# Patient Record
Sex: FEMALE | Race: WHITE | NOT HISPANIC OR LATINO | Employment: FULL TIME | URBAN - NONMETROPOLITAN AREA
[De-identification: names, ages, dates, MRNs, and addresses within clinical notes are randomized per-mention and may not be internally consistent; named-entity substitution may affect disease eponyms.]

---

## 2017-03-28 ENCOUNTER — OFFICE VISIT (OUTPATIENT)
Dept: OBSTETRICS AND GYNECOLOGY | Facility: CLINIC | Age: 32
End: 2017-03-28

## 2017-03-28 VITALS
WEIGHT: 291 LBS | BODY MASS INDEX: 57.13 KG/M2 | SYSTOLIC BLOOD PRESSURE: 130 MMHG | DIASTOLIC BLOOD PRESSURE: 66 MMHG | HEIGHT: 60 IN

## 2017-03-28 DIAGNOSIS — Z01.419 ENCOUNTER FOR GYNECOLOGICAL EXAMINATION WITHOUT ABNORMAL FINDING: Primary | ICD-10-CM

## 2017-03-28 PROCEDURE — 87624 HPV HI-RISK TYP POOLED RSLT: CPT | Performed by: NURSE PRACTITIONER

## 2017-03-28 PROCEDURE — G0123 SCREEN CERV/VAG THIN LAYER: HCPCS | Performed by: NURSE PRACTITIONER

## 2017-03-28 PROCEDURE — 99395 PREV VISIT EST AGE 18-39: CPT | Performed by: NURSE PRACTITIONER

## 2017-03-28 RX ORDER — M-VIT,TX,IRON,MINS/CALC/FOLIC 27MG-0.4MG
TABLET ORAL
COMMUNITY

## 2017-03-28 RX ORDER — POTASSIUM CHLORIDE 750 MG/1
CAPSULE, EXTENDED RELEASE ORAL EVERY 24 HOURS
COMMUNITY

## 2017-03-28 RX ORDER — FUROSEMIDE 20 MG/1
TABLET ORAL EVERY 24 HOURS
COMMUNITY

## 2017-03-28 RX ORDER — IBUPROFEN 200 MG
TABLET ORAL EVERY 6 HOURS
COMMUNITY

## 2017-03-28 RX ORDER — MELOXICAM 15 MG/1
TABLET ORAL
COMMUNITY
Start: 2017-03-16

## 2017-03-28 NOTE — PROGRESS NOTES
Subjective   Grant Earl is a 31 y.o. female.     HPI Comments: Annual exam.         The following portions of the patient's history were reviewed and updated as appropriate: allergies, current medications, past family history, past medical history, past social history, past surgical history and problem list.    Review of Systems   Constitutional: Negative for activity change, appetite change, fatigue and fever.   HENT: Negative for congestion, sore throat and trouble swallowing.    Eyes: Negative for pain, discharge and visual disturbance.   Respiratory: Negative for apnea, shortness of breath and wheezing.    Cardiovascular: Negative for chest pain, palpitations and leg swelling.   Gastrointestinal: Negative for abdominal pain, constipation and diarrhea.   Genitourinary: Negative for frequency, pelvic pain, urgency and vaginal discharge.   Musculoskeletal: Negative for back pain and gait problem.   Skin: Negative for color change and rash.   Neurological: Negative for dizziness, weakness and numbness.   Psychiatric/Behavioral: Negative for confusion and sleep disturbance.       Objective   Physical Exam   Constitutional: She is oriented to person, place, and time. She appears well-developed and well-nourished. No distress.   HENT:   Head: Normocephalic.   Right Ear: External ear normal.   Left Ear: External ear normal.   Nose: Nose normal.   Mouth/Throat: Oropharynx is clear and moist.   Eyes: Conjunctivae are normal. Right eye exhibits no discharge. Left eye exhibits no discharge.   Neck: Normal range of motion. Neck supple. Carotid bruit is not present. No tracheal deviation present. No thyromegaly present.   Cardiovascular: Normal rate, regular rhythm, normal heart sounds and intact distal pulses.    No murmur heard.  Pulmonary/Chest: Effort normal and breath sounds normal. No respiratory distress. She has no wheezes. Right breast exhibits no inverted nipple, no mass, no nipple discharge, no skin change and no  tenderness. Left breast exhibits no inverted nipple, no mass, no nipple discharge, no skin change and no tenderness. Breasts are symmetrical. There is no breast swelling.   Abdominal: Soft. She exhibits no distension and no mass. There is no tenderness. There is no guarding. Hernia confirmed negative in the right inguinal area and confirmed negative in the left inguinal area.       Genitourinary: Rectum normal, vagina normal and uterus normal. Rectal exam shows no external hemorrhoid, no fissure, no mass, no tenderness and anal tone normal. No breast tenderness, discharge or bleeding. Pelvic exam was performed with patient supine. There is no rash, tenderness, lesion or injury on the right labia. There is no rash, tenderness, lesion or injury on the left labia. Uterus is not enlarged, not fixed and not tender. Cervix exhibits no motion tenderness, no discharge and no friability. Right adnexum displays no mass, no tenderness and no fullness. Left adnexum displays no mass, no tenderness and no fullness. No bleeding in the vagina. No vaginal discharge found.   Genitourinary Comments: Urethral meatus  Normal  Perineum  Normal   Musculoskeletal: Normal range of motion. She exhibits no edema or tenderness.   Lymphadenopathy:        Head (right side): No submental, no submandibular, no tonsillar, no preauricular, no posterior auricular and no occipital adenopathy present.        Head (left side): No submental, no submandibular, no tonsillar, no preauricular, no posterior auricular and no occipital adenopathy present.     She has no cervical adenopathy.        Right cervical: No superficial cervical, no deep cervical and no posterior cervical adenopathy present.       Left cervical: No superficial cervical, no deep cervical and no posterior cervical adenopathy present.     She has no axillary adenopathy.        Right: No inguinal adenopathy present.        Left: No inguinal adenopathy present.   Neurological: She is alert  and oriented to person, place, and time. Coordination normal.   Skin: Skin is warm and dry. No bruising and no rash noted. She is not diaphoretic. No erythema.   Psychiatric: She has a normal mood and affect. Her behavior is normal. Judgment and thought content normal.   Nursing note and vitals reviewed.      Assessment/Plan    Well woman exam. Pap collected. Annual labs followed by PCP.  Pt mentioned both considering pregnancy and tubal ligation. Discussed both with her r/t her medical and surgical hx and current medications.    I encouraged the pt to schedule a consult with an MD r/t her medical/surgical hx for further information to help guide her decision making.     Grant was seen today for gynecologic exam.    Diagnoses and all orders for this visit:    Encounter for gynecological examination without abnormal finding  -     Liquid-based Pap Smear, Screening

## 2017-04-03 LAB
GEN CATEG CVX/VAG CYTO-IMP: NORMAL
LAB AP CASE REPORT: NORMAL
LAB AP GYN ADDITIONAL INFORMATION: NORMAL
Lab: NORMAL
PATH INTERP SPEC-IMP: NORMAL
STAT OF ADQ CVX/VAG CYTO-IMP: NORMAL

## 2017-12-13 ENCOUNTER — APPOINTMENT (OUTPATIENT)
Dept: GENERAL RADIOLOGY | Facility: HOSPITAL | Age: 32
End: 2017-12-13

## 2017-12-13 ENCOUNTER — HOSPITAL ENCOUNTER (EMERGENCY)
Facility: HOSPITAL | Age: 32
Discharge: ANOTHER HOSPITAL | End: 2017-12-14
Attending: EMERGENCY MEDICINE | Admitting: EMERGENCY MEDICINE

## 2017-12-13 DIAGNOSIS — R11.2 NAUSEA AND VOMITING, INTRACTABILITY OF VOMITING NOT SPECIFIED, UNSPECIFIED VOMITING TYPE: ICD-10-CM

## 2017-12-13 DIAGNOSIS — R10.9 ABDOMINAL PAIN, UNSPECIFIED ABDOMINAL LOCATION: ICD-10-CM

## 2017-12-13 DIAGNOSIS — K43.9 VENTRAL HERNIA WITHOUT OBSTRUCTION OR GANGRENE: Primary | ICD-10-CM

## 2017-12-13 LAB
ALBUMIN SERPL-MCNC: 4.2 G/DL (ref 3.5–5)
ALBUMIN/GLOB SERPL: 1.3 G/DL (ref 1.1–2.5)
ALP SERPL-CCNC: 99 U/L (ref 24–120)
ALT SERPL W P-5'-P-CCNC: 100 U/L (ref 0–54)
ANION GAP SERPL CALCULATED.3IONS-SCNC: 10 MMOL/L (ref 4–13)
AST SERPL-CCNC: 60 U/L (ref 7–45)
BASOPHILS # BLD AUTO: 0.02 10*3/MM3 (ref 0–0.2)
BASOPHILS NFR BLD AUTO: 0.2 % (ref 0–2)
BILIRUB SERPL-MCNC: 0.6 MG/DL (ref 0.1–1)
BUN BLD-MCNC: 15 MG/DL (ref 5–21)
BUN/CREAT SERPL: 25.9 (ref 7–25)
CALCIUM SPEC-SCNC: 8.9 MG/DL (ref 8.4–10.4)
CHLORIDE SERPL-SCNC: 106 MMOL/L (ref 98–110)
CK SERPL-CCNC: 69 U/L (ref 0–203)
CO2 SERPL-SCNC: 24 MMOL/L (ref 24–31)
CREAT BLD-MCNC: 0.58 MG/DL (ref 0.5–1.4)
D-LACTATE SERPL-SCNC: 1.4 MMOL/L (ref 0.5–2)
DEPRECATED RDW RBC AUTO: 39.7 FL (ref 40–54)
EOSINOPHIL # BLD AUTO: 0.03 10*3/MM3 (ref 0–0.7)
EOSINOPHIL NFR BLD AUTO: 0.3 % (ref 0–4)
ERYTHROCYTE [DISTWIDTH] IN BLOOD BY AUTOMATED COUNT: 13.9 % (ref 12–15)
GFR SERPL CREATININE-BSD FRML MDRD: 120 ML/MIN/1.73
GLOBULIN UR ELPH-MCNC: 3.2 GM/DL
GLUCOSE BLD-MCNC: 129 MG/DL (ref 70–100)
HCT VFR BLD AUTO: 37.7 % (ref 37–47)
HGB BLD-MCNC: 12.3 G/DL (ref 12–16)
IMM GRANULOCYTES # BLD: 0.03 10*3/MM3 (ref 0–0.03)
IMM GRANULOCYTES NFR BLD: 0.3 % (ref 0–5)
LIPASE SERPL-CCNC: 27 U/L (ref 23–203)
LYMPHOCYTES # BLD AUTO: 0.5 10*3/MM3 (ref 0.72–4.86)
LYMPHOCYTES NFR BLD AUTO: 5.1 % (ref 15–45)
MCH RBC QN AUTO: 25.7 PG (ref 28–32)
MCHC RBC AUTO-ENTMCNC: 32.6 G/DL (ref 33–36)
MCV RBC AUTO: 78.9 FL (ref 82–98)
MONOCYTES # BLD AUTO: 0.28 10*3/MM3 (ref 0.19–1.3)
MONOCYTES NFR BLD AUTO: 2.9 % (ref 4–12)
NEUTROPHILS # BLD AUTO: 8.85 10*3/MM3 (ref 1.87–8.4)
NEUTROPHILS NFR BLD AUTO: 91.2 % (ref 39–78)
NRBC BLD MANUAL-RTO: 0 /100 WBC (ref 0–0)
PLATELET # BLD AUTO: 240 10*3/MM3 (ref 130–400)
PMV BLD AUTO: 9.6 FL (ref 6–12)
POTASSIUM BLD-SCNC: 4.4 MMOL/L (ref 3.5–5.3)
PROT SERPL-MCNC: 7.4 G/DL (ref 6.3–8.7)
RBC # BLD AUTO: 4.78 10*6/MM3 (ref 4.2–5.4)
SODIUM BLD-SCNC: 140 MMOL/L (ref 135–145)
TROPONIN I SERPL-MCNC: <0.012 NG/ML (ref 0–0.03)
WBC NRBC COR # BLD: 9.71 10*3/MM3 (ref 4.8–10.8)

## 2017-12-13 PROCEDURE — 0 IOHEXOL 300 MG/ML SOLUTION: Performed by: EMERGENCY MEDICINE

## 2017-12-13 PROCEDURE — 74000 HC ABDOMEN KUB: CPT

## 2017-12-13 PROCEDURE — 96361 HYDRATE IV INFUSION ADD-ON: CPT

## 2017-12-13 PROCEDURE — 25010000002 MORPHINE PER 10 MG: Performed by: EMERGENCY MEDICINE

## 2017-12-13 PROCEDURE — 83690 ASSAY OF LIPASE: CPT | Performed by: EMERGENCY MEDICINE

## 2017-12-13 PROCEDURE — 85025 COMPLETE CBC W/AUTO DIFF WBC: CPT | Performed by: EMERGENCY MEDICINE

## 2017-12-13 PROCEDURE — 80053 COMPREHEN METABOLIC PANEL: CPT | Performed by: EMERGENCY MEDICINE

## 2017-12-13 PROCEDURE — 25010000002 ONDANSETRON PER 1 MG: Performed by: EMERGENCY MEDICINE

## 2017-12-13 PROCEDURE — 93005 ELECTROCARDIOGRAM TRACING: CPT | Performed by: EMERGENCY MEDICINE

## 2017-12-13 PROCEDURE — 99284 EMERGENCY DEPT VISIT MOD MDM: CPT

## 2017-12-13 PROCEDURE — 96375 TX/PRO/DX INJ NEW DRUG ADDON: CPT

## 2017-12-13 PROCEDURE — 82550 ASSAY OF CK (CPK): CPT | Performed by: EMERGENCY MEDICINE

## 2017-12-13 PROCEDURE — 93010 ELECTROCARDIOGRAM REPORT: CPT | Performed by: INTERNAL MEDICINE

## 2017-12-13 PROCEDURE — 96374 THER/PROPH/DIAG INJ IV PUSH: CPT

## 2017-12-13 PROCEDURE — 83605 ASSAY OF LACTIC ACID: CPT | Performed by: EMERGENCY MEDICINE

## 2017-12-13 PROCEDURE — 84703 CHORIONIC GONADOTROPIN ASSAY: CPT | Performed by: EMERGENCY MEDICINE

## 2017-12-13 PROCEDURE — 84484 ASSAY OF TROPONIN QUANT: CPT | Performed by: EMERGENCY MEDICINE

## 2017-12-13 RX ORDER — MORPHINE SULFATE 10 MG/ML
8 INJECTION INTRAMUSCULAR; INTRAVENOUS; SUBCUTANEOUS ONCE
Status: COMPLETED | OUTPATIENT
Start: 2017-12-13 | End: 2017-12-13

## 2017-12-13 RX ORDER — ONDANSETRON 2 MG/ML
4 INJECTION INTRAMUSCULAR; INTRAVENOUS ONCE
Status: COMPLETED | OUTPATIENT
Start: 2017-12-13 | End: 2017-12-13

## 2017-12-13 RX ORDER — SODIUM CHLORIDE 0.9 % (FLUSH) 0.9 %
10 SYRINGE (ML) INJECTION AS NEEDED
Status: DISCONTINUED | OUTPATIENT
Start: 2017-12-13 | End: 2017-12-14 | Stop reason: HOSPADM

## 2017-12-13 RX ORDER — SODIUM CHLORIDE 9 MG/ML
1000 INJECTION, SOLUTION INTRAVENOUS ONCE
Status: COMPLETED | OUTPATIENT
Start: 2017-12-13 | End: 2017-12-14

## 2017-12-13 RX ADMIN — IOHEXOL 50 ML: 300 INJECTION, SOLUTION INTRAVENOUS at 23:03

## 2017-12-13 RX ADMIN — MORPHINE SULFATE 8 MG: 10 INJECTION, SOLUTION INTRAMUSCULAR; INTRAVENOUS at 23:03

## 2017-12-13 RX ADMIN — ONDANSETRON 4 MG: 2 INJECTION, SOLUTION INTRAMUSCULAR; INTRAVENOUS at 23:03

## 2017-12-14 ENCOUNTER — APPOINTMENT (OUTPATIENT)
Dept: CT IMAGING | Facility: HOSPITAL | Age: 32
End: 2017-12-14

## 2017-12-14 VITALS
BODY MASS INDEX: 55.95 KG/M2 | TEMPERATURE: 98.6 F | HEIGHT: 60 IN | SYSTOLIC BLOOD PRESSURE: 139 MMHG | OXYGEN SATURATION: 97 % | DIASTOLIC BLOOD PRESSURE: 67 MMHG | RESPIRATION RATE: 14 BRPM | WEIGHT: 285 LBS | HEART RATE: 76 BPM

## 2017-12-14 LAB
B-HCG UR QL: NEGATIVE
BILIRUB UR QL STRIP: NEGATIVE
CLARITY UR: CLEAR
COLOR UR: YELLOW
GLUCOSE UR STRIP-MCNC: NEGATIVE MG/DL
HCG SERPL QL: NEGATIVE
HGB UR QL STRIP.AUTO: NEGATIVE
HOLD SPECIMEN: NORMAL
HOLD SPECIMEN: NORMAL
KETONES UR QL STRIP: NEGATIVE
LEUKOCYTE ESTERASE UR QL STRIP.AUTO: NEGATIVE
NITRITE UR QL STRIP: NEGATIVE
PH UR STRIP.AUTO: <=5 [PH] (ref 5–8)
PROT UR QL STRIP: NEGATIVE
SP GR UR STRIP: >1.03 (ref 1–1.03)
UROBILINOGEN UR QL STRIP: ABNORMAL
WHOLE BLOOD HOLD SPECIMEN: NORMAL
WHOLE BLOOD HOLD SPECIMEN: NORMAL

## 2017-12-14 PROCEDURE — 0 IOPAMIDOL 61 % SOLUTION: Performed by: EMERGENCY MEDICINE

## 2017-12-14 PROCEDURE — 96376 TX/PRO/DX INJ SAME DRUG ADON: CPT

## 2017-12-14 PROCEDURE — 74177 CT ABD & PELVIS W/CONTRAST: CPT

## 2017-12-14 PROCEDURE — 25010000002 ONDANSETRON PER 1 MG: Performed by: EMERGENCY MEDICINE

## 2017-12-14 PROCEDURE — 81003 URINALYSIS AUTO W/O SCOPE: CPT | Performed by: EMERGENCY MEDICINE

## 2017-12-14 PROCEDURE — 25010000002 MORPHINE PER 10 MG: Performed by: EMERGENCY MEDICINE

## 2017-12-14 PROCEDURE — 81025 URINE PREGNANCY TEST: CPT | Performed by: EMERGENCY MEDICINE

## 2017-12-14 RX ORDER — ONDANSETRON 2 MG/ML
4 INJECTION INTRAMUSCULAR; INTRAVENOUS ONCE
Status: COMPLETED | OUTPATIENT
Start: 2017-12-14 | End: 2017-12-14

## 2017-12-14 RX ORDER — MORPHINE SULFATE 4 MG/ML
4 INJECTION, SOLUTION INTRAMUSCULAR; INTRAVENOUS ONCE
Status: COMPLETED | OUTPATIENT
Start: 2017-12-14 | End: 2017-12-14

## 2017-12-14 RX ADMIN — IOPAMIDOL 100 ML: 612 INJECTION, SOLUTION INTRAVENOUS at 02:10

## 2017-12-14 RX ADMIN — SODIUM CHLORIDE 1000 ML: 9 INJECTION, SOLUTION INTRAVENOUS at 00:16

## 2017-12-14 RX ADMIN — MORPHINE SULFATE 4 MG: 4 INJECTION, SOLUTION INTRAMUSCULAR; INTRAVENOUS at 04:54

## 2017-12-14 RX ADMIN — ONDANSETRON 4 MG: 2 INJECTION, SOLUTION INTRAMUSCULAR; INTRAVENOUS at 03:43

## 2017-12-14 RX ADMIN — ONDANSETRON 4 MG: 2 INJECTION, SOLUTION INTRAMUSCULAR; INTRAVENOUS at 01:06

## 2017-12-14 NOTE — ED NOTES
SPOKE WITH THE CAPTAIN AT [facility] ABOUT TRANSFER OF PT TO [facility].  CAPTAIN STATES THAT THEY WILL NOT BE ABLE TO TAKE THE PT UNTIL AFTER 0700 WHEN THE NEXT SHIFT COMES ON.  CAPTAIN STATES THAT HE WILL PASS THE TRANSFER ON TO THE NEXT SHIFTS CAPTAIN AND THAT THEY WILL CALL BACK TO CONFIRM TRANSFER.     Felisha Blackwood RN  12/14/17 0518

## 2017-12-14 NOTE — ED NOTES
Dr. Marshall speaking with Dr. Shine at [facility].     Eloina Saucedo  12/14/17 2742

## 2017-12-14 NOTE — ED PROVIDER NOTES
"Subjective   HPI Comments: 33 y/o female with history of multiple abdominal surgeries and hernias in the past sees a Dr. Jimenez in [facility] for her last hernia operation who arrives for 4 hours of diffuse but worse epigastric and suprapubic abdominal pain with noted nausea without vomiting, diarrhea without blood formation, without fevers, chills, cp, sob, falls, trauma, medication changes urinary complaints, flank pain, numbness, tingling, loss of sensation or other issues. Patient states she feel she has \"another hernia\" in her lower abdomen. She arrives in [facility], no vomiting in the ED.     Patient is a 32 y.o. female presenting with abdominal pain.   History provided by:  Patient   used: No    Abdominal Pain   Pain location:  Generalized  Pain quality: sharp and stabbing    Pain radiates to:  Does not radiate  Onset quality:  Gradual  Duration:  4 hours  Timing:  Constant  Progression:  Worsening  Chronicity:  Recurrent  Context: previous surgery    Context: not alcohol use    Relieved by:  Nothing  Worsened by:  Nothing  Ineffective treatments:  None tried  Associated symptoms: diarrhea    Associated symptoms: no chest pain, no constipation, no dysuria, no fever, no hematochezia, no nausea, no shortness of breath and no vomiting    Risk factors: multiple surgeries        Review of Systems   Constitutional: Negative for fever.   Respiratory: Negative for shortness of breath.    Cardiovascular: Negative for chest pain.   Gastrointestinal: Positive for abdominal pain and diarrhea. Negative for constipation, hematochezia, nausea and vomiting.   Genitourinary: Negative for dysuria.   All other systems reviewed and are negative.      History reviewed. No pertinent past medical history.    No Known Allergies    Past Surgical History:   Procedure Laterality Date   • ADENOIDECTOMY     • CHOLECYSTECTOMY     • HERNIA REPAIR     • LAPAROSCOPIC COLON RESECTION     • LYMPH NODE DISSECTION     • " TONSILLECTOMY         Family History   Problem Relation Age of Onset   • COPD Father    • Coronary artery disease Father    • Emphysema Father    • Hypertension Father    • Hyperlipidemia Father    • Hypothyroidism Father    • Lupus Mother    • Osteoporosis Mother    • Rheum arthritis Mother    • Hypothyroidism Mother    • Rheum arthritis Brother    • Hypereosinophilic syndrome Brother    • Crohn's disease Brother        Social History     Social History   • Marital status:      Spouse name: N/A   • Number of children: N/A   • Years of education: N/A     Social History Main Topics   • Smoking status: Never Smoker   • Smokeless tobacco: Never Used   • Alcohol use Yes      Comment: rarely   • Drug use: No   • Sexual activity: Yes     Partners: Male     Birth control/ protection: None     Other Topics Concern   • None     Social History Narrative           Objective   Physical Exam   Constitutional: She is oriented to person, place, and time. She appears well-developed and well-nourished.   HENT:   Head: Normocephalic and atraumatic.   Eyes: EOM are normal. Pupils are equal, round, and reactive to light.   Neck: Normal range of motion. Neck supple.   Cardiovascular: Normal rate, regular rhythm, normal heart sounds and intact distal pulses.    Pulmonary/Chest: Effort normal and breath sounds normal.   Abdominal: Soft. She exhibits no distension and no mass. There is generalized tenderness. There is no rigidity, no rebound, no guarding, no CVA tenderness, no tenderness at McBurney's point and negative Treadwell's sign. No hernia.   Musculoskeletal: Normal range of motion.   Neurological: She is alert and oriented to person, place, and time. She has normal reflexes.   Skin: Skin is warm and dry.   Psychiatric: She has a normal mood and affect. Her behavior is normal. Judgment and thought content normal.   Vitals reviewed.      Procedures         ED Course  ED Course   Comment By Time   CT scan positive for ventral  wall hernia which contains small bowel but no findings of strength relation or small bowel obstruction.  Patient having ongoing abdominal pain, nausea and episodes of vomiting.  Requiring multiple doses of IV pain medication.  Patient has had previous surgery in [place].  I did speak to Dr. Shine who recommended transfer there for further management.  Given no obstruction, no NG tube will be placed at this time. Chace Marshall MD 12/14 8114      XR Abdomen KUB   Final Result   No radiographic evidence of acute abdominopelvic process. Nonobstructive   bowel gas pattern.       This report was finalized on 12/13/2017 22:10 by Dr Mati Womack, .      CT Abdomen Pelvis With Contrast    (Results Pending)                 MDM  Number of Diagnoses or Management Options  Abdominal pain, unspecified abdominal location: new and requires workup  Nausea and vomiting, intractability of vomiting not specified, unspecified vomiting type: new and requires workup  Ventral hernia without obstruction or gangrene: new and requires workup     Amount and/or Complexity of Data Reviewed  Clinical lab tests: reviewed  Tests in the radiology section of CPT®: reviewed    Risk of Complications, Morbidity, and/or Mortality  Presenting problems: moderate  Diagnostic procedures: moderate  Management options: moderate    Patient Progress  Patient progress: stable      Final diagnoses:   Ventral hernia without obstruction or gangrene   Abdominal pain, unspecified abdominal location   Nausea and vomiting, intractability of vomiting not specified, unspecified vomiting type            Chace Marshall MD  12/14/17 3148

## 2017-12-14 NOTE — ED NOTES
SPOKE WITH [facility] AT THIS TIME.  FACE SHEET FAXED.  [facility] STATES THAT THE CAPTAIN WILL CALL BACK FOR FURTHER INFORMATION ABOUT PT SHORTLY.     Felisha Blackwood RN  12/14/17 4299

## 2017-12-14 NOTE — ED NOTES
[facility] CALLED VERIFYING PATIENT IS READY FOR TRANSFER.     Mona Crowley RN  12/14/17 5380

## 2017-12-14 NOTE — ED NOTES
SPOKE WITH [facility] AT THIS TIME.  [facility] STATES THAT THEY ARE UNABLE TO TAKE PT AT THIS TIME.     Felisha Blackwood RN  12/14/17 0767

## 2017-12-14 NOTE — ED NOTES
Call placed to [facility] (PREMA Marquez). Awaiting return phone call from surgeon on call for pt's doctor (Dr. Jimenez).     Eloina Saucedo  12/14/17 0441       Eloina Saucedo  12/14/17 0442

## 2017-12-14 NOTE — ED NOTES
Pt accepted to [facility] ED by Dr. Rl Marquez, RN at transfer center. Pt demographics faxed to [phone]. Nurse can call report to [phone].     Eloina Saucedo  12/14/17 0458

## 2018-01-22 ENCOUNTER — OFFICE VISIT (OUTPATIENT)
Dept: OBSTETRICS AND GYNECOLOGY | Facility: CLINIC | Age: 33
End: 2018-01-22

## 2018-01-22 ENCOUNTER — PROCEDURE VISIT (OUTPATIENT)
Dept: OBSTETRICS AND GYNECOLOGY | Facility: CLINIC | Age: 33
End: 2018-01-22

## 2018-01-22 VITALS
WEIGHT: 283 LBS | SYSTOLIC BLOOD PRESSURE: 124 MMHG | HEIGHT: 60 IN | DIASTOLIC BLOOD PRESSURE: 66 MMHG | BODY MASS INDEX: 55.56 KG/M2

## 2018-01-22 DIAGNOSIS — N83.201 CYST OF RIGHT OVARY: ICD-10-CM

## 2018-01-22 DIAGNOSIS — N92.0 MENORRHAGIA WITH REGULAR CYCLE: Primary | ICD-10-CM

## 2018-01-22 DIAGNOSIS — R10.2 PELVIC PAIN: Primary | ICD-10-CM

## 2018-01-22 PROCEDURE — 99213 OFFICE O/P EST LOW 20 MIN: CPT | Performed by: OBSTETRICS & GYNECOLOGY

## 2018-01-22 PROCEDURE — 76830 TRANSVAGINAL US NON-OB: CPT | Performed by: OBSTETRICS & GYNECOLOGY

## 2018-01-22 NOTE — PROGRESS NOTES
Subjective   Grant Earl is a 32 y.o. female who has been having heavy bleeding and cramps.      History of Present Illness  Patient states that she wants OCP's for the heavy bleeding and cramps.  Patient has Chron's disease and has a laparoscopic colon resection. Patient has had 2 ventral hernia repairs.    The following portions of the patient's history were reviewed and updated as appropriate: allergies, current medications, past family history, past medical history, past social history, past surgical history and problem list.    Review of Systems   Constitutional: Negative for fatigue and unexpected weight change.   HENT: Negative.    Eyes: Negative.    Respiratory: Negative for cough, chest tightness and shortness of breath.    Cardiovascular: Negative for chest pain, palpitations and leg swelling.   Gastrointestinal: Negative for abdominal distention, abdominal pain, anal bleeding, blood in stool, constipation, diarrhea, nausea and vomiting.   Endocrine: Negative for polydipsia and polyuria.   Genitourinary: Positive for menstrual problem and pelvic pain. Negative for difficulty urinating, dyspareunia, dysuria, frequency, genital sores, hematuria, urgency, vaginal bleeding, vaginal discharge and vaginal pain.   Musculoskeletal: Negative.    Skin: Negative for color change and rash.   Allergic/Immunologic: Negative.    Neurological: Negative.  Negative for dizziness, seizures, syncope, light-headedness and headaches.   Hematological: Negative for adenopathy. Does not bruise/bleed easily.   Psychiatric/Behavioral: Negative for agitation, confusion, dysphoric mood, sleep disturbance and suicidal ideas. The patient is not nervous/anxious.        Objective   Physical Exam   Constitutional: She is oriented to person, place, and time.   Morbid obesity   HENT:   Head: Normocephalic.   Eyes: Conjunctivae are normal. Pupils are equal, round, and reactive to light. Right eye exhibits no discharge. Left eye exhibits no  discharge.   Neck: Normal range of motion. Neck supple. No tracheal deviation present. No thyromegaly present.   Cardiovascular: Normal rate, regular rhythm and normal heart sounds.    Pulmonary/Chest: Effort normal and breath sounds normal. She has no wheezes. She has no rales. Right breast exhibits no inverted nipple, no mass, no nipple discharge, no skin change and no tenderness. Left breast exhibits no inverted nipple, no mass, no nipple discharge, no skin change and no tenderness. Breasts are symmetrical. There is no breast swelling.   Abdominal: Soft. Bowel sounds are normal. She exhibits no distension and no mass. There is no tenderness. There is no rebound and no guarding. No hernia. Hernia confirmed negative in the right inguinal area and confirmed negative in the left inguinal area.   Panniculus is much larger on the right than on the left and there is an area of firmness in this area.   Genitourinary: Rectum normal, vagina normal and uterus normal. Rectal exam shows no external hemorrhoid, no internal hemorrhoid, no fissure, no mass, no tenderness and anal tone normal. No breast tenderness, discharge or bleeding. Pelvic exam was performed with patient supine. No labial fusion. There is no rash, tenderness, lesion or injury on the right labia. There is no rash, tenderness, lesion or injury on the left labia. Cervix exhibits no motion tenderness, no discharge and no friability. Right adnexum displays no mass, no tenderness and no fullness. Left adnexum displays no mass, no tenderness and no fullness. No erythema, tenderness or bleeding in the vagina. No foreign body in the vagina. No signs of injury around the vagina. No vaginal discharge found.   Genitourinary Comments: BUS glands appear nl, perineum intact, urethral orifice appears nl, vagina has good support.   Musculoskeletal: Normal range of motion.   Lymphadenopathy:        Right: No inguinal adenopathy present.        Left: No inguinal adenopathy  present.   Neurological: She is alert and oriented to person, place, and time. She has normal reflexes.   Skin: Skin is warm and dry. No rash noted.   Psychiatric: She has a normal mood and affect. Her behavior is normal. Judgment and thought content normal.   Nursing note and vitals reviewed.        Assessment/Plan     Menorrhagia  Dysmenorrhea  Morbid obesity  Chron's disease, s/p colon resection.  Irregular shape to pannus with the right being larger than the left.    Overactive bladder symptoms for 6 months-try Myrbetriq 50 mg daily  Start Lo loEstrin  See her surgeon who did the hernia repairs to evaluate the irregular pannus.    RV 1 month

## 2018-03-19 ENCOUNTER — TELEPHONE (OUTPATIENT)
Dept: OBSTETRICS AND GYNECOLOGY | Facility: CLINIC | Age: 33
End: 2018-03-19

## 2018-03-19 RX ORDER — NORETHINDRONE ACETATE AND ETHINYL ESTRADIOL 1; .02 MG/1; MG/1
1 TABLET ORAL DAILY
Qty: 21 TABLET | Refills: 0 | Status: SHIPPED | OUTPATIENT
Start: 2018-03-19 | End: 2018-04-18 | Stop reason: SDUPTHER

## 2018-03-19 NOTE — TELEPHONE ENCOUNTER
Pt left vm saying she received samples of medication and wanted script sent to [facility]. I called pt and left vm last ov she was to return in 1 month. appt was canceled by provider but not rescheduled. She needs to have that appt rescheduled and then we can find out what med she was wanting sent in.

## 2018-04-18 ENCOUNTER — OFFICE VISIT (OUTPATIENT)
Dept: OBSTETRICS AND GYNECOLOGY | Facility: CLINIC | Age: 33
End: 2018-04-18

## 2018-04-18 VITALS
SYSTOLIC BLOOD PRESSURE: 130 MMHG | WEIGHT: 286 LBS | BODY MASS INDEX: 56.15 KG/M2 | DIASTOLIC BLOOD PRESSURE: 80 MMHG | HEIGHT: 60 IN

## 2018-04-18 DIAGNOSIS — Z11.3 SCREEN FOR STD (SEXUALLY TRANSMITTED DISEASE): ICD-10-CM

## 2018-04-18 DIAGNOSIS — Z78.9 NON-SMOKER: ICD-10-CM

## 2018-04-18 DIAGNOSIS — Z01.419 ENCOUNTER FOR GYNECOLOGICAL EXAMINATION WITHOUT ABNORMAL FINDING: Primary | ICD-10-CM

## 2018-04-18 DIAGNOSIS — Z30.41 ENCOUNTER FOR SURVEILLANCE OF CONTRACEPTIVE PILLS: ICD-10-CM

## 2018-04-18 PROCEDURE — 87798 DETECT AGENT NOS DNA AMP: CPT | Performed by: NURSE PRACTITIONER

## 2018-04-18 PROCEDURE — 87661 TRICHOMONAS VAGINALIS AMPLIF: CPT | Performed by: NURSE PRACTITIONER

## 2018-04-18 PROCEDURE — 87491 CHLMYD TRACH DNA AMP PROBE: CPT | Performed by: NURSE PRACTITIONER

## 2018-04-18 PROCEDURE — 87591 N.GONORRHOEAE DNA AMP PROB: CPT | Performed by: NURSE PRACTITIONER

## 2018-04-18 PROCEDURE — 87512 GARDNER VAG DNA QUANT: CPT | Performed by: NURSE PRACTITIONER

## 2018-04-18 PROCEDURE — 87624 HPV HI-RISK TYP POOLED RSLT: CPT | Performed by: NURSE PRACTITIONER

## 2018-04-18 PROCEDURE — G0123 SCREEN CERV/VAG THIN LAYER: HCPCS | Performed by: NURSE PRACTITIONER

## 2018-04-18 PROCEDURE — 87481 CANDIDA DNA AMP PROBE: CPT | Performed by: NURSE PRACTITIONER

## 2018-04-18 PROCEDURE — 99395 PREV VISIT EST AGE 18-39: CPT | Performed by: NURSE PRACTITIONER

## 2018-04-18 RX ORDER — NORETHINDRONE ACETATE AND ETHINYL ESTRADIOL 1; .02 MG/1; MG/1
1 TABLET ORAL DAILY
Qty: 21 TABLET | Refills: 11 | Status: SHIPPED | OUTPATIENT
Start: 2018-04-18 | End: 2019-04-18

## 2018-04-18 NOTE — PATIENT INSTRUCTIONS

## 2018-04-18 NOTE — PROGRESS NOTES
"Subjective   Grant Earl is a 32 y.o. female.     Annual exam.     Pt desires refill of OCP.         The following portions of the patient's history were reviewed and updated as appropriate: allergies, current medications, past family history, past medical history, past social history, past surgical history and problem list.    /80 (BP Location: Left arm, Patient Position: Sitting, Cuff Size: Large Adult)   Ht 152.4 cm (60\")   Wt 130 kg (286 lb)   LMP 04/09/2018 (Exact Date)   Breastfeeding? No   BMI 55.86 kg/m²     Review of Systems   Constitutional: Negative for activity change, appetite change, fatigue and fever.   HENT: Negative for congestion, sore throat and trouble swallowing.    Eyes: Negative for pain, discharge and visual disturbance.   Respiratory: Negative for apnea, shortness of breath and wheezing.    Cardiovascular: Negative for chest pain, palpitations and leg swelling.   Gastrointestinal: Negative for abdominal pain, constipation and diarrhea.   Genitourinary: Positive for vaginal discharge. Negative for frequency, pelvic pain and urgency.   Musculoskeletal: Negative for back pain and gait problem.   Skin: Negative for color change and rash.   Neurological: Negative for dizziness, weakness and numbness.   Psychiatric/Behavioral: Negative for confusion and sleep disturbance.       Objective   Physical Exam   Constitutional: She is oriented to person, place, and time. She appears well-developed and well-nourished. No distress.   HENT:   Head: Normocephalic.   Right Ear: External ear normal.   Left Ear: External ear normal.   Nose: Nose normal.   Mouth/Throat: Oropharynx is clear and moist.   Eyes: Conjunctivae are normal. Right eye exhibits no discharge. Left eye exhibits no discharge. No scleral icterus.   Neck: Normal range of motion. Neck supple. Carotid bruit is not present. No tracheal deviation present. No thyromegaly present.   Cardiovascular: Normal rate, regular rhythm, normal " heart sounds and intact distal pulses.    No murmur heard.  Pulmonary/Chest: Effort normal and breath sounds normal. No respiratory distress. She has no wheezes. Right breast exhibits no inverted nipple, no mass, no nipple discharge, no skin change and no tenderness. Left breast exhibits no inverted nipple, no mass, no nipple discharge, no skin change and no tenderness. Breasts are symmetrical. There is no breast swelling.   Abdominal: Soft. She exhibits no distension and no mass. There is no tenderness. There is no guarding. No hernia. Hernia confirmed negative in the right inguinal area and confirmed negative in the left inguinal area.   Genitourinary: Rectum normal, vagina normal and uterus normal. Rectal exam shows no mass. No breast tenderness, discharge or bleeding. Pelvic exam was performed with patient supine. There is no rash, tenderness, lesion or injury on the right labia. There is no rash, tenderness, lesion or injury on the left labia. Uterus is not enlarged, not fixed and not tender. Cervix exhibits no motion tenderness, no discharge and no friability. Right adnexum displays no mass, no tenderness and no fullness. Left adnexum displays no mass, no tenderness and no fullness. No erythema, tenderness or bleeding in the vagina. No foreign body in the vagina. No signs of injury around the vagina. No vaginal discharge found.   Genitourinary Comments:   BSU normal  Urethral meatus  Normal  Perineum  Normal   Musculoskeletal: Normal range of motion. She exhibits no edema or tenderness.   Lymphadenopathy:        Head (right side): No submental, no submandibular, no tonsillar, no preauricular, no posterior auricular and no occipital adenopathy present.        Head (left side): No submental, no submandibular, no tonsillar, no preauricular, no posterior auricular and no occipital adenopathy present.     She has no cervical adenopathy.        Right cervical: No superficial cervical, no deep cervical and no  posterior cervical adenopathy present.       Left cervical: No superficial cervical, no deep cervical and no posterior cervical adenopathy present.     She has no axillary adenopathy.        Right: No inguinal adenopathy present.        Left: No inguinal adenopathy present.   Neurological: She is alert and oriented to person, place, and time. Coordination normal.   Skin: Skin is warm and dry. No bruising and no rash noted. She is not diaphoretic. No erythema.   Psychiatric: She has a normal mood and affect. Her behavior is normal. Judgment and thought content normal.   Nursing note and vitals reviewed.      Assessment/Plan    Well woman exam. Pap collected, BV panel, gonorrhea and chlamydia testing added.     Pt reports she is doing well on OCP and desires to continue. Denies HA, BTB, heavy bleeding and cramping. Will refill OCP.  RV annual exam/prn.   Grant was seen today for gynecologic exam.    Diagnoses and all orders for this visit:    Encounter for gynecological examination without abnormal finding  -     Liquid-based Pap Smear, Screening    Screen for STD (sexually transmitted disease)    BMI 50.0-59.9, adult    Non-smoker    Encounter for surveillance of contraceptive pills    Other orders  -     norethindrone-ethinyl estradiol (LOESTRIN 1/20, 21,) 1-20 MG-MCG per tablet; Take 1 tablet by mouth Daily.

## 2018-04-25 LAB
GEN CATEG CVX/VAG CYTO-IMP: NORMAL
LAB AP CASE REPORT: NORMAL
LAB AP GYN ADDITIONAL INFORMATION: NORMAL
LAB AP GYN OTHER FINDINGS: NORMAL
Lab: NORMAL
PATH INTERP SPEC-IMP: NORMAL
STAT OF ADQ CVX/VAG CYTO-IMP: NORMAL

## 2018-04-26 RX ORDER — DOXYCYCLINE 100 MG/1
100 TABLET ORAL 2 TIMES DAILY
Qty: 14 TABLET | Refills: 0 | Status: SHIPPED | OUTPATIENT
Start: 2018-04-26 | End: 2018-05-03

## 2018-04-26 RX ORDER — METRONIDAZOLE 7.5 MG/G
GEL VAGINAL
Qty: 1 TUBE | Refills: 0 | Status: SHIPPED | OUTPATIENT
Start: 2018-04-26 | End: 2018-05-01

## 2019-10-16 ENCOUNTER — LAB REQUISITION (OUTPATIENT)
Dept: LAB | Facility: HOSPITAL | Age: 34
End: 2019-10-16

## 2019-10-16 DIAGNOSIS — Z00.00 ROUTINE GENERAL MEDICAL EXAMINATION AT A HEALTH CARE FACILITY: ICD-10-CM

## 2019-10-16 LAB
ADV 40+41 DNA STL QL NAA+NON-PROBE: NOT DETECTED
ASTRO TYP 1-8 RNA STL QL NAA+NON-PROBE: NOT DETECTED
C CAYETANENSIS DNA STL QL NAA+NON-PROBE: NOT DETECTED
C DIFF TOX GENS STL QL NAA+PROBE: NOT DETECTED
CAMPY SP DNA.DIARRHEA STL QL NAA+PROBE: NOT DETECTED
CRYPTOSP STL CULT: NOT DETECTED
E COLI DNA SPEC QL NAA+PROBE: NOT DETECTED
E HISTOLYT AG STL-ACNC: NOT DETECTED
EAEC PAA PLAS AGGR+AATA ST NAA+NON-PRB: NOT DETECTED
EC STX1 + STX2 GENES STL NAA+PROBE: NOT DETECTED
EPEC EAE GENE STL QL NAA+NON-PROBE: NOT DETECTED
ETEC LTA+ST1A+ST1B TOX ST NAA+NON-PROBE: NOT DETECTED
G LAMBLIA DNA SPEC QL NAA+PROBE: DETECTED
NOROVIRUS GI+II RNA STL QL NAA+NON-PROBE: NOT DETECTED
P SHIGELLOIDES DNA STL QL NAA+PROBE: NOT DETECTED
RV RNA STL NAA+PROBE: NOT DETECTED
SALMONELLA DNA SPEC QL NAA+PROBE: NOT DETECTED
SAPO I+II+IV+V RNA STL QL NAA+NON-PROBE: NOT DETECTED
SHIGELLA SP+EIEC IPAH STL QL NAA+PROBE: NOT DETECTED
V CHOLERAE DNA SPEC QL NAA+PROBE: NOT DETECTED
VIBRIO DNA SPEC NAA+PROBE: NOT DETECTED
YERSINIA STL CULT: NOT DETECTED

## 2019-10-16 PROCEDURE — 0097U HC BIOFIRE FILMARRAY GI PANEL: CPT

## 2020-09-28 ENCOUNTER — APPOINTMENT (OUTPATIENT)
Dept: ULTRASOUND IMAGING | Facility: HOSPITAL | Age: 35
End: 2020-09-28

## 2020-09-28 ENCOUNTER — HOSPITAL ENCOUNTER (EMERGENCY)
Facility: HOSPITAL | Age: 35
Discharge: HOME | End: 2020-09-28
Attending: EMERGENCY MEDICINE | Admitting: EMERGENCY MEDICINE

## 2020-09-28 ENCOUNTER — APPOINTMENT (OUTPATIENT)
Dept: CT IMAGING | Facility: HOSPITAL | Age: 35
End: 2020-09-28

## 2020-09-28 VITALS
DIASTOLIC BLOOD PRESSURE: 86 MMHG | HEART RATE: 82 BPM | OXYGEN SATURATION: 98 % | RESPIRATION RATE: 18 BRPM | TEMPERATURE: 98.8 F | SYSTOLIC BLOOD PRESSURE: 143 MMHG | WEIGHT: 274 LBS | BODY MASS INDEX: 53.79 KG/M2 | HEIGHT: 60 IN

## 2020-09-28 DIAGNOSIS — A08.4 VIRAL GASTROENTERITIS: ICD-10-CM

## 2020-09-28 DIAGNOSIS — K43.9 VENTRAL HERNIA WITHOUT OBSTRUCTION OR GANGRENE: ICD-10-CM

## 2020-09-28 DIAGNOSIS — D25.9 UTERINE LEIOMYOMA, UNSPECIFIED LOCATION: ICD-10-CM

## 2020-09-28 DIAGNOSIS — N83.201 RIGHT OVARIAN CYST: Primary | ICD-10-CM

## 2020-09-28 LAB
ALBUMIN SERPL-MCNC: 4.7 G/DL (ref 3.5–5.2)
ALBUMIN/GLOB SERPL: 1.3 G/DL
ALP SERPL-CCNC: 102 U/L (ref 39–117)
ALT SERPL W P-5'-P-CCNC: 85 U/L (ref 1–33)
ANION GAP SERPL CALCULATED.3IONS-SCNC: 15 MMOL/L (ref 5–15)
AST SERPL-CCNC: 68 U/L (ref 1–32)
B-HCG UR QL: NEGATIVE
BACTERIA UR QL AUTO: ABNORMAL /HPF
BASOPHILS # BLD AUTO: 0.05 10*3/MM3 (ref 0–0.2)
BASOPHILS NFR BLD AUTO: 0.6 % (ref 0–1.5)
BILIRUB SERPL-MCNC: 0.8 MG/DL (ref 0–1.2)
BILIRUB UR QL STRIP: ABNORMAL
BUN SERPL-MCNC: 18 MG/DL (ref 6–20)
BUN/CREAT SERPL: 27.7 (ref 7–25)
CALCIUM SPEC-SCNC: 9.1 MG/DL (ref 8.6–10.5)
CHLORIDE SERPL-SCNC: 105 MMOL/L (ref 98–107)
CLARITY UR: CLEAR
CO2 SERPL-SCNC: 17 MMOL/L (ref 22–29)
COLOR UR: ABNORMAL
CREAT SERPL-MCNC: 0.65 MG/DL (ref 0.57–1)
DEPRECATED RDW RBC AUTO: 38.8 FL (ref 37–54)
EOSINOPHIL # BLD AUTO: 0.05 10*3/MM3 (ref 0–0.4)
EOSINOPHIL NFR BLD AUTO: 0.6 % (ref 0.3–6.2)
ERYTHROCYTE [DISTWIDTH] IN BLOOD BY AUTOMATED COUNT: 14 % (ref 12.3–15.4)
GFR SERPL CREATININE-BSD FRML MDRD: 103 ML/MIN/1.73
GLOBULIN UR ELPH-MCNC: 3.6 GM/DL
GLUCOSE SERPL-MCNC: 123 MG/DL (ref 65–99)
GLUCOSE UR STRIP-MCNC: NEGATIVE MG/DL
HCT VFR BLD AUTO: 43.1 % (ref 34–46.6)
HGB BLD-MCNC: 14.6 G/DL (ref 12–15.9)
HGB UR QL STRIP.AUTO: NEGATIVE
HOLD SPECIMEN: NORMAL
HYALINE CASTS UR QL AUTO: ABNORMAL /LPF
IMM GRANULOCYTES # BLD AUTO: 0.03 10*3/MM3 (ref 0–0.05)
IMM GRANULOCYTES NFR BLD AUTO: 0.4 % (ref 0–0.5)
INTERNAL NEGATIVE CONTROL: NEGATIVE
INTERNAL POSITIVE CONTROL: POSITIVE
KETONES UR QL STRIP: ABNORMAL
LEUKOCYTE ESTERASE UR QL STRIP.AUTO: ABNORMAL
LIPASE SERPL-CCNC: 18 U/L (ref 13–60)
LYMPHOCYTES # BLD AUTO: 0.94 10*3/MM3 (ref 0.7–3.1)
LYMPHOCYTES NFR BLD AUTO: 11.1 % (ref 19.6–45.3)
Lab: NORMAL
MCH RBC QN AUTO: 26.3 PG (ref 26.6–33)
MCHC RBC AUTO-ENTMCNC: 33.9 G/DL (ref 31.5–35.7)
MCV RBC AUTO: 77.7 FL (ref 79–97)
MONOCYTES # BLD AUTO: 0.64 10*3/MM3 (ref 0.1–0.9)
MONOCYTES NFR BLD AUTO: 7.6 % (ref 5–12)
NEUTROPHILS NFR BLD AUTO: 6.74 10*3/MM3 (ref 1.7–7)
NEUTROPHILS NFR BLD AUTO: 79.7 % (ref 42.7–76)
NITRITE UR QL STRIP: POSITIVE
NRBC BLD AUTO-RTO: 0 /100 WBC (ref 0–0.2)
PH UR STRIP.AUTO: 6 [PH] (ref 5–8)
PLATELET # BLD AUTO: 309 10*3/MM3 (ref 140–450)
PMV BLD AUTO: 8.9 FL (ref 6–12)
POTASSIUM SERPL-SCNC: 3.7 MMOL/L (ref 3.5–5.2)
PROT SERPL-MCNC: 8.3 G/DL (ref 6–8.5)
PROT UR QL STRIP: ABNORMAL
RBC # BLD AUTO: 5.55 10*6/MM3 (ref 3.77–5.28)
RBC # UR: ABNORMAL /HPF
REF LAB TEST METHOD: ABNORMAL
SODIUM SERPL-SCNC: 137 MMOL/L (ref 136–145)
SP GR UR STRIP: >1.03 (ref 1–1.03)
SQUAMOUS #/AREA URNS HPF: ABNORMAL /HPF
UROBILINOGEN UR QL STRIP: ABNORMAL
WBC # BLD AUTO: 8.45 10*3/MM3 (ref 3.4–10.8)
WBC UR QL AUTO: ABNORMAL /HPF
WHOLE BLOOD HOLD SPECIMEN: NORMAL
WHOLE BLOOD HOLD SPECIMEN: NORMAL

## 2020-09-28 PROCEDURE — 96375 TX/PRO/DX INJ NEW DRUG ADDON: CPT

## 2020-09-28 PROCEDURE — 81025 URINE PREGNANCY TEST: CPT | Performed by: EMERGENCY MEDICINE

## 2020-09-28 PROCEDURE — 96374 THER/PROPH/DIAG INJ IV PUSH: CPT

## 2020-09-28 PROCEDURE — 76830 TRANSVAGINAL US NON-OB: CPT

## 2020-09-28 PROCEDURE — 85025 COMPLETE CBC W/AUTO DIFF WBC: CPT | Performed by: EMERGENCY MEDICINE

## 2020-09-28 PROCEDURE — 80053 COMPREHEN METABOLIC PANEL: CPT | Performed by: EMERGENCY MEDICINE

## 2020-09-28 PROCEDURE — 81001 URINALYSIS AUTO W/SCOPE: CPT | Performed by: EMERGENCY MEDICINE

## 2020-09-28 PROCEDURE — 25010000002 ONDANSETRON PER 1 MG: Performed by: EMERGENCY MEDICINE

## 2020-09-28 PROCEDURE — 93005 ELECTROCARDIOGRAM TRACING: CPT | Performed by: EMERGENCY MEDICINE

## 2020-09-28 PROCEDURE — 99284 EMERGENCY DEPT VISIT MOD MDM: CPT

## 2020-09-28 PROCEDURE — 74177 CT ABD & PELVIS W/CONTRAST: CPT

## 2020-09-28 PROCEDURE — 25010000002 IOPAMIDOL 61 % SOLUTION: Performed by: EMERGENCY MEDICINE

## 2020-09-28 PROCEDURE — 25010000002 MORPHINE PER 10 MG: Performed by: EMERGENCY MEDICINE

## 2020-09-28 PROCEDURE — 93010 ELECTROCARDIOGRAM REPORT: CPT | Performed by: INTERNAL MEDICINE

## 2020-09-28 PROCEDURE — 83690 ASSAY OF LIPASE: CPT | Performed by: EMERGENCY MEDICINE

## 2020-09-28 RX ORDER — DICYCLOMINE HCL 20 MG
20 TABLET ORAL EVERY 6 HOURS
Qty: 15 TABLET | Refills: 0 | Status: SHIPPED | OUTPATIENT
Start: 2020-09-28

## 2020-09-28 RX ORDER — ONDANSETRON 4 MG/1
4 TABLET, ORALLY DISINTEGRATING ORAL EVERY 8 HOURS PRN
Qty: 12 TABLET | Refills: 0 | Status: SHIPPED | OUTPATIENT
Start: 2020-09-28

## 2020-09-28 RX ORDER — ONDANSETRON 2 MG/ML
4 INJECTION INTRAMUSCULAR; INTRAVENOUS ONCE
Status: COMPLETED | OUTPATIENT
Start: 2020-09-28 | End: 2020-09-28

## 2020-09-28 RX ADMIN — MORPHINE SULFATE 4 MG: 4 INJECTION, SOLUTION INTRAMUSCULAR; INTRAVENOUS at 10:36

## 2020-09-28 RX ADMIN — SODIUM CHLORIDE 1000 ML: 9 INJECTION, SOLUTION INTRAVENOUS at 10:36

## 2020-09-28 RX ADMIN — IOPAMIDOL 100 ML: 612 INJECTION, SOLUTION INTRAVENOUS at 11:15

## 2020-09-28 RX ADMIN — ONDANSETRON HYDROCHLORIDE 4 MG: 2 SOLUTION INTRAMUSCULAR; INTRAVENOUS at 10:36

## 2021-09-14 ENCOUNTER — OFFICE VISIT (OUTPATIENT)
Dept: OBSTETRICS AND GYNECOLOGY | Facility: CLINIC | Age: 36
End: 2021-09-14

## 2021-09-14 VITALS
BODY MASS INDEX: 57.52 KG/M2 | WEIGHT: 293 LBS | SYSTOLIC BLOOD PRESSURE: 138 MMHG | HEIGHT: 60 IN | DIASTOLIC BLOOD PRESSURE: 86 MMHG

## 2021-09-14 DIAGNOSIS — N89.8 VAGINAL IRRITATION: ICD-10-CM

## 2021-09-14 DIAGNOSIS — N93.8 DUB (DYSFUNCTIONAL UTERINE BLEEDING): Primary | ICD-10-CM

## 2021-09-14 DIAGNOSIS — E66.01 MORBID OBESITY WITH BMI OF 50.0-59.9, ADULT (HCC): ICD-10-CM

## 2021-09-14 DIAGNOSIS — Z78.9 NON-SMOKER: ICD-10-CM

## 2021-09-14 DIAGNOSIS — N90.89 LABIAL IRRITATION: ICD-10-CM

## 2021-09-14 DIAGNOSIS — Z12.31 ENCOUNTER FOR SCREENING MAMMOGRAM FOR MALIGNANT NEOPLASM OF BREAST: ICD-10-CM

## 2021-09-14 PROCEDURE — 87798 DETECT AGENT NOS DNA AMP: CPT | Performed by: NURSE PRACTITIONER

## 2021-09-14 PROCEDURE — 87512 GARDNER VAG DNA QUANT: CPT | Performed by: NURSE PRACTITIONER

## 2021-09-14 PROCEDURE — 87481 CANDIDA DNA AMP PROBE: CPT | Performed by: NURSE PRACTITIONER

## 2021-09-14 PROCEDURE — G0123 SCREEN CERV/VAG THIN LAYER: HCPCS | Performed by: NURSE PRACTITIONER

## 2021-09-14 PROCEDURE — 87563 M. GENITALIUM AMP PROBE: CPT | Performed by: NURSE PRACTITIONER

## 2021-09-14 PROCEDURE — 87661 TRICHOMONAS VAGINALIS AMPLIF: CPT | Performed by: NURSE PRACTITIONER

## 2021-09-14 PROCEDURE — 99204 OFFICE O/P NEW MOD 45 MIN: CPT | Performed by: NURSE PRACTITIONER

## 2021-09-14 PROCEDURE — 87624 HPV HI-RISK TYP POOLED RSLT: CPT | Performed by: NURSE PRACTITIONER

## 2021-09-14 RX ORDER — NYSTATIN AND TRIAMCINOLONE ACETONIDE 100000; 1 [USP'U]/G; MG/G
OINTMENT TOPICAL 2 TIMES DAILY
Qty: 30 G | Refills: 0 | Status: SHIPPED | OUTPATIENT
Start: 2021-09-14

## 2021-09-14 RX ORDER — OMEPRAZOLE 20 MG/1
20 CAPSULE, DELAYED RELEASE ORAL DAILY PRN
COMMUNITY
Start: 2021-07-09

## 2021-09-15 LAB — HPV I/H RISK 4 DNA CVX QL PROBE+SIG AMP: NOT DETECTED

## 2021-09-20 LAB
GEN CATEG CVX/VAG CYTO-IMP: NORMAL
LAB AP CASE REPORT: NORMAL
LAB AP GYN OTHER FINDINGS: NORMAL
PATH INTERP SPEC-IMP: NORMAL
STAT OF ADQ CVX/VAG CYTO-IMP: NORMAL
TRICHOMONAS VAGINALIS PCR: NOT DETECTED

## 2021-09-29 ENCOUNTER — APPOINTMENT (OUTPATIENT)
Dept: CT IMAGING | Facility: HOSPITAL | Age: 36
End: 2021-09-29

## 2021-09-29 ENCOUNTER — APPOINTMENT (OUTPATIENT)
Dept: CARDIOLOGY | Facility: HOSPITAL | Age: 36
End: 2021-09-29

## 2021-09-29 ENCOUNTER — HOSPITAL ENCOUNTER (EMERGENCY)
Facility: HOSPITAL | Age: 36
Discharge: HOME | End: 2021-09-29
Admitting: FAMILY MEDICINE

## 2021-09-29 ENCOUNTER — APPOINTMENT (OUTPATIENT)
Dept: GENERAL RADIOLOGY | Facility: HOSPITAL | Age: 36
End: 2021-09-29

## 2021-09-29 VITALS
DIASTOLIC BLOOD PRESSURE: 75 MMHG | WEIGHT: 290 LBS | HEIGHT: 60 IN | OXYGEN SATURATION: 100 % | TEMPERATURE: 98.5 F | HEART RATE: 75 BPM | SYSTOLIC BLOOD PRESSURE: 143 MMHG | RESPIRATION RATE: 21 BRPM | BODY MASS INDEX: 56.93 KG/M2

## 2021-09-29 DIAGNOSIS — R07.9 CHEST PAIN, UNSPECIFIED TYPE: Primary | ICD-10-CM

## 2021-09-29 LAB
ALBUMIN SERPL-MCNC: 4.1 G/DL (ref 3.5–5.2)
ALBUMIN/GLOB SERPL: 1.3 G/DL
ALP SERPL-CCNC: 114 U/L (ref 39–117)
ALT SERPL W P-5'-P-CCNC: 31 U/L (ref 1–33)
ANION GAP SERPL CALCULATED.3IONS-SCNC: 7 MMOL/L (ref 5–15)
APTT PPP: 27.3 SECONDS (ref 24.1–35)
AST SERPL-CCNC: 25 U/L (ref 1–32)
B-HCG UR QL: NEGATIVE
BASOPHILS # BLD AUTO: 0.02 10*3/MM3 (ref 0–0.2)
BASOPHILS NFR BLD AUTO: 0.3 % (ref 0–1.5)
BH CV STRESS BP STAGE 1: NORMAL
BH CV STRESS BP STAGE 2: NORMAL
BH CV STRESS DURATION MIN STAGE 1: 3
BH CV STRESS DURATION SEC STAGE 1: 0
BH CV STRESS DURATION SEC STAGE 2: 39
BH CV STRESS GRADE STAGE 1: 10
BH CV STRESS GRADE STAGE 2: 12
BH CV STRESS HR STAGE 1: 150
BH CV STRESS HR STAGE 2: 160
BH CV STRESS METS STAGE 1: 5
BH CV STRESS METS STAGE 2: 7.5
BH CV STRESS PROTOCOL 1: NORMAL
BH CV STRESS RECOVERY BP: NORMAL MMHG
BH CV STRESS RECOVERY HR: 95 BPM
BH CV STRESS SPEED STAGE 1: 1.7
BH CV STRESS SPEED STAGE 2: 2.5
BH CV STRESS STAGE 1: 1
BH CV STRESS STAGE 2: 2
BILIRUB SERPL-MCNC: 0.5 MG/DL (ref 0–1.2)
BUN SERPL-MCNC: 13 MG/DL (ref 6–20)
BUN/CREAT SERPL: 28.9 (ref 7–25)
CALCIUM SPEC-SCNC: 9 MG/DL (ref 8.6–10.5)
CHLORIDE SERPL-SCNC: 105 MMOL/L (ref 98–107)
CO2 SERPL-SCNC: 25 MMOL/L (ref 22–29)
CREAT SERPL-MCNC: 0.45 MG/DL (ref 0.57–1)
D DIMER PPP FEU-MCNC: 0.87 MG/L (FEU) (ref 0–0.5)
DEPRECATED RDW RBC AUTO: 40.3 FL (ref 37–54)
EOSINOPHIL # BLD AUTO: 0.08 10*3/MM3 (ref 0–0.4)
EOSINOPHIL NFR BLD AUTO: 1.3 % (ref 0.3–6.2)
ERYTHROCYTE [DISTWIDTH] IN BLOOD BY AUTOMATED COUNT: 13.4 % (ref 12.3–15.4)
GFR SERPL CREATININE-BSD FRML MDRD: >150 ML/MIN/1.73
GLOBULIN UR ELPH-MCNC: 3.2 GM/DL
GLUCOSE SERPL-MCNC: 106 MG/DL (ref 65–99)
HCT VFR BLD AUTO: 37.1 % (ref 34–46.6)
HGB BLD-MCNC: 12.1 G/DL (ref 12–15.9)
IMM GRANULOCYTES # BLD AUTO: 0.02 10*3/MM3 (ref 0–0.05)
IMM GRANULOCYTES NFR BLD AUTO: 0.3 % (ref 0–0.5)
INR PPP: 0.99 (ref 0.91–1.09)
INTERNAL NEGATIVE CONTROL: NEGATIVE
INTERNAL POSITIVE CONTROL: POSITIVE
LYMPHOCYTES # BLD AUTO: 1.22 10*3/MM3 (ref 0.7–3.1)
LYMPHOCYTES NFR BLD AUTO: 19.8 % (ref 19.6–45.3)
Lab: NORMAL
MAXIMAL PREDICTED HEART RATE: 183 BPM
MCH RBC QN AUTO: 26.9 PG (ref 26.6–33)
MCHC RBC AUTO-ENTMCNC: 32.6 G/DL (ref 31.5–35.7)
MCV RBC AUTO: 82.6 FL (ref 79–97)
MONOCYTES # BLD AUTO: 0.48 10*3/MM3 (ref 0.1–0.9)
MONOCYTES NFR BLD AUTO: 7.8 % (ref 5–12)
NEUTROPHILS NFR BLD AUTO: 4.35 10*3/MM3 (ref 1.7–7)
NEUTROPHILS NFR BLD AUTO: 70.5 % (ref 42.7–76)
NRBC BLD AUTO-RTO: 0 /100 WBC (ref 0–0.2)
PERCENT MAX PREDICTED HR: 87.43 %
PLATELET # BLD AUTO: 219 10*3/MM3 (ref 140–450)
PMV BLD AUTO: 9.4 FL (ref 6–12)
POTASSIUM SERPL-SCNC: 4 MMOL/L (ref 3.5–5.2)
PROT SERPL-MCNC: 7.3 G/DL (ref 6–8.5)
PROTHROMBIN TIME: 12.7 SECONDS (ref 11.9–14.6)
RBC # BLD AUTO: 4.49 10*6/MM3 (ref 3.77–5.28)
SODIUM SERPL-SCNC: 137 MMOL/L (ref 136–145)
STRESS BASELINE BP: NORMAL MMHG
STRESS BASELINE HR: 74 BPM
STRESS PERCENT HR: 103 %
STRESS POST ESTIMATED WORKLOAD: 7.5 METS
STRESS POST EXERCISE DUR MIN: 3 MIN
STRESS POST EXERCISE DUR SEC: 39 SEC
STRESS POST PEAK BP: NORMAL MMHG
STRESS POST PEAK HR: 160 BPM
STRESS TARGET HR: 156 BPM
TROPONIN T SERPL-MCNC: <0.01 NG/ML (ref 0–0.03)
TROPONIN T SERPL-MCNC: <0.01 NG/ML (ref 0–0.03)
WBC # BLD AUTO: 6.17 10*3/MM3 (ref 3.4–10.8)

## 2021-09-29 PROCEDURE — 93010 ELECTROCARDIOGRAM REPORT: CPT | Performed by: INTERNAL MEDICINE

## 2021-09-29 PROCEDURE — 71275 CT ANGIOGRAPHY CHEST: CPT

## 2021-09-29 PROCEDURE — 85730 THROMBOPLASTIN TIME PARTIAL: CPT | Performed by: NURSE PRACTITIONER

## 2021-09-29 PROCEDURE — 0 IOPAMIDOL PER 1 ML: Performed by: NURSE PRACTITIONER

## 2021-09-29 PROCEDURE — 93350 STRESS TTE ONLY: CPT

## 2021-09-29 PROCEDURE — 71045 X-RAY EXAM CHEST 1 VIEW: CPT

## 2021-09-29 PROCEDURE — 25010000002 PERFLUTREN 6.52 MG/ML SUSPENSION: Performed by: EMERGENCY MEDICINE

## 2021-09-29 PROCEDURE — 93017 CV STRESS TEST TRACING ONLY: CPT

## 2021-09-29 PROCEDURE — 85379 FIBRIN DEGRADATION QUANT: CPT | Performed by: NURSE PRACTITIONER

## 2021-09-29 PROCEDURE — 84484 ASSAY OF TROPONIN QUANT: CPT | Performed by: NURSE PRACTITIONER

## 2021-09-29 PROCEDURE — 93350 STRESS TTE ONLY: CPT | Performed by: EMERGENCY MEDICINE

## 2021-09-29 PROCEDURE — 85025 COMPLETE CBC W/AUTO DIFF WBC: CPT | Performed by: NURSE PRACTITIONER

## 2021-09-29 PROCEDURE — 80053 COMPREHEN METABOLIC PANEL: CPT | Performed by: NURSE PRACTITIONER

## 2021-09-29 PROCEDURE — 93018 CV STRESS TEST I&R ONLY: CPT | Performed by: EMERGENCY MEDICINE

## 2021-09-29 PROCEDURE — 93005 ELECTROCARDIOGRAM TRACING: CPT | Performed by: NURSE PRACTITIONER

## 2021-09-29 PROCEDURE — 81025 URINE PREGNANCY TEST: CPT | Performed by: NURSE PRACTITIONER

## 2021-09-29 PROCEDURE — 85610 PROTHROMBIN TIME: CPT | Performed by: NURSE PRACTITIONER

## 2021-09-29 PROCEDURE — 93005 ELECTROCARDIOGRAM TRACING: CPT | Performed by: FAMILY MEDICINE

## 2021-09-29 PROCEDURE — 93352 ADMIN ECG CONTRAST AGENT: CPT | Performed by: EMERGENCY MEDICINE

## 2021-09-29 PROCEDURE — 99283 EMERGENCY DEPT VISIT LOW MDM: CPT

## 2021-09-29 RX ORDER — ONDANSETRON 2 MG/ML
4 INJECTION INTRAMUSCULAR; INTRAVENOUS ONCE
Status: DISCONTINUED | OUTPATIENT
Start: 2021-09-29 | End: 2021-09-29 | Stop reason: HOSPADM

## 2021-09-29 RX ORDER — MORPHINE SULFATE 2 MG/ML
2 INJECTION, SOLUTION INTRAMUSCULAR; INTRAVENOUS ONCE
Status: DISCONTINUED | OUTPATIENT
Start: 2021-09-29 | End: 2021-09-29 | Stop reason: HOSPADM

## 2021-09-29 RX ADMIN — PERFLUTREN 8.48 MG: 6.52 INJECTION, SUSPENSION INTRAVENOUS at 13:00

## 2021-09-29 RX ADMIN — IOPAMIDOL 100 ML: 755 INJECTION, SOLUTION INTRAVENOUS at 12:05

## 2021-09-30 LAB
QT INTERVAL: 382 MS
QT INTERVAL: 402 MS
QTC INTERVAL: 429 MS
QTC INTERVAL: 430 MS

## 2021-10-03 PROBLEM — Z99.89 CPAP (CONTINUOUS POSITIVE AIRWAY PRESSURE) DEPENDENCE: Status: ACTIVE | Noted: 2021-10-03

## 2021-10-03 PROBLEM — G47.33 OBSTRUCTIVE SLEEP APNEA: Status: ACTIVE | Noted: 2021-10-03

## 2021-10-04 NOTE — PATIENT INSTRUCTIONS
"BMI for Adults  What is BMI?  Body mass index (BMI) is a number that is calculated from a person's weight and height. BMI can help estimate how much of a person's weight is composed of fat. BMI does not measure body fat directly. Rather, it is an alternative to procedures that directly measure body fat, which can be difficult and expensive.  BMI can help identify people who may be at higher risk for certain medical problems.  What are BMI measurements used for?  BMI is used as a screening tool to identify possible weight problems. It helps determine whether a person is obese, overweight, a healthy weight, or underweight.  BMI is useful for:  · Identifying a weight problem that may be related to a medical condition or may increase the risk for medical problems.  · Promoting changes, such as changes in diet and exercise, to help reach a healthy weight. BMI screening can be repeated to see if these changes are working.  How is BMI calculated?  BMI involves measuring your weight in relation to your height. Both height and weight are measured, and the BMI is calculated from those numbers. This can be done either in English (U[place] or metric measurements. Note that charts and online BMI calculators are available to help you find your BMI quickly and easily without having to do these calculations yourself.  To calculate your BMI in English (U.S[place] measurements:    1. Measure your weight in pounds (lb).  2. Multiply the number of pounds by 703.  ? For example, for a person who weighs 180 lb, multiply that number by 703, which equals 126,540.  3. Measure your height in inches. Then multiply that number by itself to get a measurement called \"inches squared.\"  ? For example, for a person who is 70 inches tall, the \"inches squared\" measurement is 70 inches x 70 inches, which equals 4,900 inches squared.  4. Divide the total from step 2 (number of lb x 703) by the total from step 3 (inches squared): 126,540 ÷ 4,900 = 25.8. This is " "your BMI.    To calculate your BMI in metric measurements:  1. Measure your weight in kilograms (kg).  2. Measure your height in meters (m). Then multiply that number by itself to get a measurement called \"meters squared.\"  ? For example, for a person who is 1.75 m tall, the \"meters squared\" measurement is 1.75 m x 1.75 m, which is equal to 3.1 meters squared.  3. Divide the number of kilograms (your weight) by the meters squared number. In this example: 70 ÷ 3.1 = 22.6. This is your BMI.  What do the results mean?  BMI charts are used to identify whether you are underweight, normal weight, overweight, or obese. The following guidelines will be used:  · Underweight: BMI less than 18.5.  · Normal weight: BMI between 18.5 and 24.9.  · Overweight: BMI between 25 and 29.9.  · Obese: BMI of 30 or above.  Keep these notes in mind:  · Weight includes both fat and muscle, so someone with a muscular build, such as an athlete, may have a BMI that is higher than 24.9. In cases like these, BMI is not an accurate measure of body fat.  · To determine if excess body fat is the cause of a BMI of 25 or higher, further assessments may need to be done by a health care provider.  · BMI is usually interpreted in the same way for men and women.  Where to find more information  For more information about BMI, including tools to quickly calculate your BMI, go to these websites:  · Centers for Disease Control and Prevention: www.cdc.gov  · American Heart Association: www.heart.org  · National Heart, Lung, and Blood [place]: www.nhlbi.nih.gov  Summary  · Body mass index (BMI) is a number that is calculated from a person's weight and height.  · BMI may help estimate how much of a person's weight is composed of fat. BMI can help identify those who may be at higher risk for certain medical problems.  · BMI can be measured using English measurements or metric measurements.  · BMI charts are used to identify whether you are underweight, normal " weight, overweight, or obese.  This information is not intended to replace advice given to you by your health care provider. Make sure you discuss any questions you have with your health care provider.  Document Revised: 09/09/2020 Document Reviewed: 07/17/2020  Elsevier Patient Education © 2021 [employer]

## 2022-03-08 ENCOUNTER — TELEPHONE (OUTPATIENT)
Dept: BARIATRICS/WEIGHT MGMT | Facility: CLINIC | Age: 37
End: 2022-03-08

## 2022-03-08 NOTE — TELEPHONE ENCOUNTER
LVM to remind them of their new patient appointment tomorrow and to bring completed paperwork or arrive 30 minutes early.

## 2022-03-10 ENCOUNTER — OFFICE VISIT (OUTPATIENT)
Dept: BARIATRICS/WEIGHT MGMT | Facility: CLINIC | Age: 37
End: 2022-03-10

## 2022-03-10 VITALS
HEART RATE: 74 BPM | SYSTOLIC BLOOD PRESSURE: 133 MMHG | TEMPERATURE: 98.4 F | DIASTOLIC BLOOD PRESSURE: 85 MMHG | OXYGEN SATURATION: 97 % | BODY MASS INDEX: 57.52 KG/M2 | WEIGHT: 293 LBS | HEIGHT: 60 IN

## 2022-03-10 DIAGNOSIS — G47.33 OBSTRUCTIVE SLEEP APNEA: ICD-10-CM

## 2022-03-10 DIAGNOSIS — Z87.19 HISTORY OF VENTRAL HERNIA REPAIR: ICD-10-CM

## 2022-03-10 DIAGNOSIS — E66.01 CLASS 3 SEVERE OBESITY DUE TO EXCESS CALORIES WITH SERIOUS COMORBIDITY AND BODY MASS INDEX (BMI) OF 60.0 TO 69.9 IN ADULT: Primary | ICD-10-CM

## 2022-03-10 DIAGNOSIS — K43.2 INCISIONAL HERNIA, WITHOUT OBSTRUCTION OR GANGRENE: ICD-10-CM

## 2022-03-10 DIAGNOSIS — Z98.890 HISTORY OF VENTRAL HERNIA REPAIR: ICD-10-CM

## 2022-03-10 PROCEDURE — 99214 OFFICE O/P EST MOD 30 MIN: CPT | Performed by: NURSE PRACTITIONER

## 2022-03-10 RX ORDER — CITALOPRAM 20 MG/1
20 TABLET ORAL DAILY
COMMUNITY

## 2022-03-10 RX ORDER — BUSPIRONE HYDROCHLORIDE 15 MG/1
15 TABLET ORAL 3 TIMES DAILY
COMMUNITY

## 2022-03-10 NOTE — PROGRESS NOTES
Patient Care Team:  Douglas Carnes MD as PCP - General (Family Medicine)  Rocco Alvarez DO as Cardiologist (Cardiology)      Subjective     Patient is a 36 y.o. female presents with morbid obesity and her Body mass index is 61.28 kg/m².     She is here for discussion of surgical weight loss options.  She stated she has been with the disease of obesity for year(s).  She stated she suffers from YASMIN,  and morbid obesity due to her weight gain.  She stated that weight loss helps alleviate these symptoms.   She stated that she has tried other diet regimens including KETO and medications to help with weight loss.  She stated that she has attempted these conservative methods for weight loss without maintaining long term success.  Today she would like to discuss surgical weight loss options such as the Laparoscopic Sleeve Gastrectomy or the Laparoscopic R - Y Gastric Bypass.     She has a history of cholecystectomy, partial colon removal, and ventral hernia repair.  Patient states that she has 2-3 hernias that are present at this time.  Patient states that general surgeon would like for her to lose weight before proceeding with hernia repair.  Patient states that she struggled with her weight for years and would like assistance with treatment for obesity.    Review of Systems   Constitutional: Negative.    Respiratory: Negative.    Cardiovascular: Negative.    Gastrointestinal: Positive for abdominal pain and constipation.   Endocrine: Negative.    Musculoskeletal: Positive for arthralgias, back pain and myalgias.   Skin: Positive for rash.   Neurological: Positive for numbness.   Psychiatric/Behavioral: The patient is nervous/anxious.         History  Past Medical History:   Diagnosis Date   • Diverticulitis    • History of swelling of feet       Past Surgical History:   Procedure Laterality Date   • ADENOIDECTOMY     • CHOLECYSTECTOMY      open   • HERNIA REPAIR      x3 living tissue   • LAPAROSCOPIC  COLON RESECTION     • LYMPH NODE DISSECTION     • TONSILLECTOMY        Social History     Socioeconomic History   • Marital status: Single   Tobacco Use   • Smoking status: Never Smoker   • Smokeless tobacco: Never Used   Substance and Sexual Activity   • Alcohol use: Yes     Alcohol/week: 2.0 standard drinks     Types: 1 Glasses of wine, 1 Cans of beer per week     Comment: rarely   • Drug use: No   • Sexual activity: Yes     Partners: Male     Birth control/protection: OCP      Family History   Problem Relation Age of Onset   • Lupus Mother    • Osteoporosis Mother    • Rheum arthritis Mother    • Hypothyroidism Mother    • COPD Father    • Coronary artery disease Father    • Emphysema Father    • Hypertension Father    • Hyperlipidemia Father    • Hypothyroidism Father    • Rheum arthritis Brother    • Hypereosinophilic syndrome Brother    • Crohn's disease Brother    • Breast cancer Paternal Aunt    • Colon cancer Paternal Aunt    • Colon cancer Paternal Aunt    • Hypertension Maternal Grandmother    • Stroke Maternal Grandmother    • Cancer Maternal Grandfather    • Aneurysm Paternal Grandmother    • Heart disease Paternal Grandfather    • Heart attack Paternal Grandfather    • Ovarian cancer Neg Hx    • Uterine cancer Neg Hx       No Known Allergies       Current Outpatient Medications:   •  busPIRone (BUSPAR) 15 MG tablet, Take 15 mg by mouth 3 (Three) Times a Day., Disp: , Rfl:   •  citalopram (CeleXA) 20 MG tablet, Take 20 mg by mouth Daily., Disp: , Rfl:   •  meloxicam (MOBIC) 15 MG tablet, Take  by mouth., Disp: , Rfl:   •  nystatin-triamcinolone (MYCOLOG) 077253-3.1 UNIT/GM-% ointment, Apply  topically to the appropriate area as directed 2 (Two) Times a Day., Disp: 30 g, Rfl: 0  •  omeprazole (priLOSEC) 20 MG capsule, Take 20 mg by mouth Daily As Needed., Disp: , Rfl:   •  ondansetron ODT (ZOFRAN-ODT) 4 MG disintegrating tablet, Place 1 tablet on the tongue Every 8 (Eight) Hours As Needed for Nausea or  Vomiting., Disp: 12 tablet, Rfl: 0  •  potassium chloride (MICRO-K) 10 MEQ CR capsule, Take  by mouth Daily., Disp: , Rfl:   •  therapeutic multivitamin-minerals (THERAGRAN-M) tablet, Take  by mouth., Disp: , Rfl:   •  dicyclomine (BENTYL) 20 MG tablet, Take 1 tablet by mouth Every 6 (Six) Hours. (Patient taking differently: Take 20 mg by mouth Every 6 (Six) Hours As Needed.), Disp: 15 tablet, Rfl: 0  •  furosemide (LASIX) 20 MG tablet, Take  by mouth Daily., Disp: , Rfl:   •  ibuprofen (ADVIL,MOTRIN) 200 MG tablet, Take  by mouth Every 6 (Six) Hours., Disp: , Rfl:     Objective     Vital Signs  Temp:  [98.4 °F (36.9 °C)] 98.4 °F (36.9 °C)  Heart Rate:  [74] 74  BP: (133)/(85) 133/85  Body mass index is 61.28 kg/m².      03/10/22  0920   Weight: (!) 142 kg (313 lb 12.8 oz)       Physical Exam  Vitals reviewed.   Constitutional:       Appearance: She is obese.   Eyes:      Pupils: Pupils are equal, round, and reactive to light.   Cardiovascular:      Rate and Rhythm: Normal rate and regular rhythm.   Pulmonary:      Effort: Pulmonary effort is normal.   Abdominal:      General: Bowel sounds are normal.      Palpations: Abdomen is soft.      Comments: Abnormal shaped abdomen. Scar present above umbilicus and large midline scar below umbilicus. Incisional hernia present x 2 . Small healed scarring present from cholecystectomy.   Musculoskeletal:         General: Normal range of motion.   Skin:     General: Skin is warm and dry.   Neurological:      Mental Status: She is alert and oriented to person, place, and time.   Psychiatric:         Mood and Affect: Mood normal.         Behavior: Behavior normal.            Results Review:   I reviewed the patient's new clinical results.      Patient's Body mass index is 61.28 kg/m². indicating that she is morbidly obese (BMI > 40 or > 35 with obesity - related health condition). Obesity-related health conditions include the following: obstructive sleep apnea. Obesity is  improving with treatment. BMI is is above average; BMI management plan is completed. We discussed portion control and increasing exercise..      Assessment/Plan   Diagnoses and all orders for this visit:    1. Class 3 severe obesity due to excess calories with serious comorbidity and body mass index (BMI) of 60.0 to 69.9 in adult (HCC) (Primary)    2. Obstructive sleep apnea  Overview:  Formatting of this note might be different from the original.  AHI: 20.7 per HST, 1/2020      3. History of ventral hernia repair    4. Incisional hernia, without obstruction or gangrene        I discussed the patient's findings and my recommendations with patient.   She has been provided a structured dietary regimen based off of her behavior.  I discussed with the patient the etiology of the disease of obesity and the potential comorbid conditions associated with this disease.  She was instructed to follow the dietary regimen and follow-up with our program in 1 month's time with any additional questions as they may arise during this time.  We emphasized on focusing on proteins and meals high in fiber as well as adequate hydration that exceed 64 ounces of water daily.    I explained that I anticipate the patient to lose weight prior to her next monthly visit.     I have also recommended that she obtain a cardiac risk assessment and psychiatric evaluation prior to surgery consideration.  Patient surgical history I am uncertain if patient will be a surgical candidate at our facility.  I have asked patient to request op note reports and have them sent to us for further evaluation.  Once Dr. Pnag evaluates op notes he will determine if patient can proceed with a sleeve gastrectomy at our facility.  At this time I explained to patient the importance of following the prescription meal plan.  She verbalizes understanding.      Marilyn Benson, APRN     03/10/22  12:55 CST

## 2022-03-15 ENCOUNTER — HOSPITAL ENCOUNTER (EMERGENCY)
Facility: HOSPITAL | Age: 37
Discharge: HOME | End: 2022-03-15
Attending: EMERGENCY MEDICINE | Admitting: EMERGENCY MEDICINE

## 2022-03-15 ENCOUNTER — APPOINTMENT (OUTPATIENT)
Dept: CT IMAGING | Facility: HOSPITAL | Age: 37
End: 2022-03-15

## 2022-03-15 VITALS
WEIGHT: 293 LBS | BODY MASS INDEX: 57.52 KG/M2 | SYSTOLIC BLOOD PRESSURE: 120 MMHG | OXYGEN SATURATION: 99 % | RESPIRATION RATE: 20 BRPM | HEART RATE: 74 BPM | DIASTOLIC BLOOD PRESSURE: 69 MMHG | HEIGHT: 60 IN | TEMPERATURE: 97.9 F

## 2022-03-15 DIAGNOSIS — S39.011A STRAIN OF ABDOMINAL WALL, INITIAL ENCOUNTER: ICD-10-CM

## 2022-03-15 DIAGNOSIS — R10.31 RLQ ABDOMINAL PAIN: Primary | ICD-10-CM

## 2022-03-15 LAB
ALBUMIN SERPL-MCNC: 4.2 G/DL (ref 3.5–5.2)
ALBUMIN/GLOB SERPL: 1.4 G/DL
ALP SERPL-CCNC: 126 U/L (ref 39–117)
ALT SERPL W P-5'-P-CCNC: 23 U/L (ref 1–33)
ANION GAP SERPL CALCULATED.3IONS-SCNC: 8 MMOL/L (ref 5–15)
AST SERPL-CCNC: 19 U/L (ref 1–32)
B-HCG UR QL: NEGATIVE
BASOPHILS # BLD AUTO: 0.04 10*3/MM3 (ref 0–0.2)
BASOPHILS NFR BLD AUTO: 0.5 % (ref 0–1.5)
BILIRUB SERPL-MCNC: 0.6 MG/DL (ref 0–1.2)
BILIRUB UR QL STRIP: NEGATIVE
BUN SERPL-MCNC: 17 MG/DL (ref 6–20)
BUN/CREAT SERPL: 34.7 (ref 7–25)
CALCIUM SPEC-SCNC: 9.1 MG/DL (ref 8.6–10.5)
CHLORIDE SERPL-SCNC: 105 MMOL/L (ref 98–107)
CLARITY UR: CLEAR
CO2 SERPL-SCNC: 25 MMOL/L (ref 22–29)
COLOR UR: YELLOW
CREAT SERPL-MCNC: 0.49 MG/DL (ref 0.57–1)
D-LACTATE SERPL-SCNC: 0.6 MMOL/L (ref 0.5–2)
DEPRECATED RDW RBC AUTO: 42.8 FL (ref 37–54)
EGFRCR SERPLBLD CKD-EPI 2021: 124.7 ML/MIN/1.73
EOSINOPHIL # BLD AUTO: 0.12 10*3/MM3 (ref 0–0.4)
EOSINOPHIL NFR BLD AUTO: 1.6 % (ref 0.3–6.2)
ERYTHROCYTE [DISTWIDTH] IN BLOOD BY AUTOMATED COUNT: 14.5 % (ref 12.3–15.4)
EXPIRATION DATE: NORMAL
GLOBULIN UR ELPH-MCNC: 2.9 GM/DL
GLUCOSE SERPL-MCNC: 95 MG/DL (ref 65–99)
GLUCOSE UR STRIP-MCNC: NEGATIVE MG/DL
HCT VFR BLD AUTO: 37.6 % (ref 34–46.6)
HGB BLD-MCNC: 11.9 G/DL (ref 12–15.9)
HGB UR QL STRIP.AUTO: NEGATIVE
IMM GRANULOCYTES # BLD AUTO: 0.02 10*3/MM3 (ref 0–0.05)
IMM GRANULOCYTES NFR BLD AUTO: 0.3 % (ref 0–0.5)
INTERNAL NEGATIVE CONTROL: NORMAL
INTERNAL POSITIVE CONTROL: NORMAL
KETONES UR QL STRIP: NEGATIVE
LEUKOCYTE ESTERASE UR QL STRIP.AUTO: NEGATIVE
LIPASE SERPL-CCNC: 14 U/L (ref 13–60)
LYMPHOCYTES # BLD AUTO: 1.36 10*3/MM3 (ref 0.7–3.1)
LYMPHOCYTES NFR BLD AUTO: 17.7 % (ref 19.6–45.3)
Lab: NORMAL
MCH RBC QN AUTO: 25.9 PG (ref 26.6–33)
MCHC RBC AUTO-ENTMCNC: 31.6 G/DL (ref 31.5–35.7)
MCV RBC AUTO: 81.9 FL (ref 79–97)
MONOCYTES # BLD AUTO: 0.5 10*3/MM3 (ref 0.1–0.9)
MONOCYTES NFR BLD AUTO: 6.5 % (ref 5–12)
NEUTROPHILS NFR BLD AUTO: 5.66 10*3/MM3 (ref 1.7–7)
NEUTROPHILS NFR BLD AUTO: 73.4 % (ref 42.7–76)
NITRITE UR QL STRIP: NEGATIVE
NRBC BLD AUTO-RTO: 0 /100 WBC (ref 0–0.2)
PH UR STRIP.AUTO: 7 [PH] (ref 5–8)
PLATELET # BLD AUTO: 224 10*3/MM3 (ref 140–450)
PMV BLD AUTO: 9.2 FL (ref 6–12)
POTASSIUM SERPL-SCNC: 4.1 MMOL/L (ref 3.5–5.2)
PROT SERPL-MCNC: 7.1 G/DL (ref 6–8.5)
PROT UR QL STRIP: NEGATIVE
RBC # BLD AUTO: 4.59 10*6/MM3 (ref 3.77–5.28)
SODIUM SERPL-SCNC: 138 MMOL/L (ref 136–145)
SP GR UR STRIP: 1.01 (ref 1–1.03)
UROBILINOGEN UR QL STRIP: NORMAL
WBC NRBC COR # BLD: 7.7 10*3/MM3 (ref 3.4–10.8)

## 2022-03-15 PROCEDURE — 81025 URINE PREGNANCY TEST: CPT | Performed by: EMERGENCY MEDICINE

## 2022-03-15 PROCEDURE — 25010000002 MORPHINE PER 10 MG: Performed by: EMERGENCY MEDICINE

## 2022-03-15 PROCEDURE — 74177 CT ABD & PELVIS W/CONTRAST: CPT

## 2022-03-15 PROCEDURE — 96375 TX/PRO/DX INJ NEW DRUG ADDON: CPT

## 2022-03-15 PROCEDURE — 25010000002 ONDANSETRON PER 1 MG: Performed by: EMERGENCY MEDICINE

## 2022-03-15 PROCEDURE — 25010000002 IOPAMIDOL 61 % SOLUTION: Performed by: EMERGENCY MEDICINE

## 2022-03-15 PROCEDURE — 83605 ASSAY OF LACTIC ACID: CPT | Performed by: EMERGENCY MEDICINE

## 2022-03-15 PROCEDURE — 81003 URINALYSIS AUTO W/O SCOPE: CPT | Performed by: EMERGENCY MEDICINE

## 2022-03-15 PROCEDURE — 85025 COMPLETE CBC W/AUTO DIFF WBC: CPT | Performed by: EMERGENCY MEDICINE

## 2022-03-15 PROCEDURE — 96374 THER/PROPH/DIAG INJ IV PUSH: CPT

## 2022-03-15 PROCEDURE — 83690 ASSAY OF LIPASE: CPT | Performed by: EMERGENCY MEDICINE

## 2022-03-15 PROCEDURE — 80053 COMPREHEN METABOLIC PANEL: CPT | Performed by: EMERGENCY MEDICINE

## 2022-03-15 PROCEDURE — 99283 EMERGENCY DEPT VISIT LOW MDM: CPT

## 2022-03-15 RX ORDER — ONDANSETRON 2 MG/ML
4 INJECTION INTRAMUSCULAR; INTRAVENOUS ONCE
Status: COMPLETED | OUTPATIENT
Start: 2022-03-15 | End: 2022-03-15

## 2022-03-15 RX ORDER — SODIUM CHLORIDE 0.9 % (FLUSH) 0.9 %
10 SYRINGE (ML) INJECTION AS NEEDED
Status: DISCONTINUED | OUTPATIENT
Start: 2022-03-15 | End: 2022-03-15 | Stop reason: HOSPADM

## 2022-03-15 RX ADMIN — IOPAMIDOL 100 ML: 612 INJECTION, SOLUTION INTRAVENOUS at 11:06

## 2022-03-15 RX ADMIN — MORPHINE SULFATE 4 MG: 4 INJECTION, SOLUTION INTRAMUSCULAR; INTRAVENOUS at 10:04

## 2022-03-15 RX ADMIN — SODIUM CHLORIDE 1000 ML: 9 INJECTION, SOLUTION INTRAVENOUS at 10:05

## 2022-03-15 RX ADMIN — ONDANSETRON HYDROCHLORIDE 4 MG: 2 SOLUTION INTRAMUSCULAR; INTRAVENOUS at 10:05

## 2022-03-15 NOTE — ED NOTES
Pt stated if anything with her hernia is to be done Dr. Yanet Jimenez in [place] should be consulted. She put mesh in her stomach in a previous surgery.
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
## 2022-04-05 ENCOUNTER — TELEPHONE (OUTPATIENT)
Dept: BARIATRICS/WEIGHT MGMT | Facility: CLINIC | Age: 37
End: 2022-04-05

## 2022-04-05 NOTE — TELEPHONE ENCOUNTER
Called and left message for pt to return my call. I am needing to discuss her insurance with her.        S/w patient and informed about BRS

## 2023-01-23 ENCOUNTER — OFFICE VISIT (OUTPATIENT)
Dept: OBSTETRICS AND GYNECOLOGY | Facility: CLINIC | Age: 38
End: 2023-01-23
Payer: COMMERCIAL

## 2023-01-23 VITALS
DIASTOLIC BLOOD PRESSURE: 88 MMHG | SYSTOLIC BLOOD PRESSURE: 116 MMHG | WEIGHT: 293 LBS | HEIGHT: 60 IN | BODY MASS INDEX: 57.52 KG/M2

## 2023-01-23 DIAGNOSIS — E66.01 MORBID OBESITY WITH BMI OF 60.0-69.9, ADULT: ICD-10-CM

## 2023-01-23 DIAGNOSIS — N93.8 DUB (DYSFUNCTIONAL UTERINE BLEEDING): Primary | ICD-10-CM

## 2023-01-23 PROCEDURE — 99213 OFFICE O/P EST LOW 20 MIN: CPT | Performed by: NURSE PRACTITIONER

## 2023-01-23 RX ORDER — ALBUTEROL SULFATE 90 UG/1
2 AEROSOL, METERED RESPIRATORY (INHALATION) EVERY 6 HOURS PRN
COMMUNITY
Start: 2022-10-24

## 2023-01-23 RX ORDER — PREDNISONE 20 MG/1
TABLET ORAL
COMMUNITY
Start: 2022-10-24 | End: 2023-03-06

## 2023-01-23 RX ORDER — LIRAGLUTIDE 6 MG/ML
INJECTION, SOLUTION SUBCUTANEOUS
COMMUNITY
Start: 2022-12-23

## 2023-01-23 RX ORDER — MELOXICAM 15 MG/1
1 TABLET ORAL DAILY
COMMUNITY
Start: 2022-11-16

## 2023-01-23 NOTE — PROGRESS NOTES
"Chief Complaint  Gynecologic Exam (Pt is here for an office visit with US for DUB.  Pt has not been seen since 9/14/21.  Pt c/o heavy periods that are coming about every 1/2 weeks as well as cramping/pain.  )    Subjective          Grant Earl presents to [facility]  History of Present Illness  Heavy bleeding   US today    Heavy periods starting every 2 wks.         Review of Systems   Constitutional: Negative for activity change, appetite change, fatigue and fever.   Respiratory: Negative for apnea and shortness of breath.    Cardiovascular: Negative for chest pain and palpitations.   Gastrointestinal: Negative for abdominal distention, abdominal pain, constipation, diarrhea, nausea and vomiting.   Endocrine: Negative for cold intolerance and heat intolerance.   Genitourinary: Positive for menstrual problem and pelvic pain (when cramping). Negative for difficulty urinating, dysuria, frequency, vaginal discharge and vaginal pain.   Neurological: Negative for headaches.   Psychiatric/Behavioral: Negative for agitation, self-injury, sleep disturbance and suicidal ideas.         Objective   Vital Signs:   /88   Ht 152.4 cm (60\")   Wt (!) 149 kg (328 lb)   BMI 64.06 kg/m²     Physical Exam  Vitals reviewed.   Constitutional:       Appearance: She is well-developed.   Eyes:      General:         Right eye: No discharge.         Left eye: No discharge.   Cardiovascular:      Rate and Rhythm: Normal rate and regular rhythm.   Pulmonary:      Effort: Pulmonary effort is normal.      Breath sounds: Normal breath sounds.   Skin:     General: Skin is warm.   Neurological:      Mental Status: She is alert and oriented to person, place, and time.   Psychiatric:         Behavior: Behavior normal.         Thought Content: Thought content normal.         Judgment: Judgment normal.         Result Review :   The following data was reviewed by: BEBE Ortega on 01/23/2023:    Data " reviewed: Radiologic studies transvaginal US                Assessment and Plan      Reviewed US report and discussed with pt.   US report indicates uterus 9.24 cm, endometrial thickness 1.84 cm, two very echogenic masses, possible lipomas measured as fibroids, in uterus right ovary not well visualized vaginally or abdominally.     Discussed pt bleeding and options for treatment.   Last Hgb 12/2022 WNL  Pt to return for annual exam and endometrial biopsy.   Pt advised to call with any questions or concerns.   Discussed S&S to report. Pt voiced understanding.     Diagnoses and all orders for this visit:    1. DUB (dysfunctional uterine bleeding) (Primary)    2. Morbid obesity with BMI of 60.0-69.9, adult (HCC)          Class 3 Severe Obesity (BMI >=40). Obesity-related health conditions include the following: obstructive sleep apnea. Obesity is worsening. BMI is is above average; no BMI management plan is appropriate. We discussed low calorie, low carb based diet program, portion control and increasing exercise.       Follow Up   Annual exam  Endometrial biopsy.     Patient was given instructions and counseling regarding her condition or for health maintenance advice. Please see specific information pulled into the AVS if appropriate.

## 2023-01-24 NOTE — PATIENT INSTRUCTIONS

## 2023-01-27 ENCOUNTER — OFFICE VISIT (OUTPATIENT)
Dept: OBSTETRICS AND GYNECOLOGY | Facility: CLINIC | Age: 38
End: 2023-01-27
Payer: COMMERCIAL

## 2023-01-27 VITALS
DIASTOLIC BLOOD PRESSURE: 74 MMHG | BODY MASS INDEX: 57.52 KG/M2 | SYSTOLIC BLOOD PRESSURE: 114 MMHG | HEIGHT: 60 IN | WEIGHT: 293 LBS

## 2023-01-27 DIAGNOSIS — N93.8 DUB (DYSFUNCTIONAL UTERINE BLEEDING): Primary | ICD-10-CM

## 2023-01-27 LAB
B-HCG UR QL: NEGATIVE
EXPIRATION DATE: NORMAL
INTERNAL NEGATIVE CONTROL: NEGATIVE
INTERNAL POSITIVE CONTROL: POSITIVE
Lab: NORMAL

## 2023-01-27 PROCEDURE — 58100 BIOPSY OF UTERUS LINING: CPT | Performed by: NURSE PRACTITIONER

## 2023-01-27 PROCEDURE — 88305 TISSUE EXAM BY PATHOLOGIST: CPT | Performed by: NURSE PRACTITIONER

## 2023-01-27 PROCEDURE — 81025 URINE PREGNANCY TEST: CPT | Performed by: NURSE PRACTITIONER

## 2023-01-27 NOTE — PROGRESS NOTES
"Chief Complaint  Procedure (Patient here for emb due to dysfunctional uterine bleeding. Patient denies questions or concerns. Patient not currently bleeding. UPT negative.)    Subjective          Grant Earl presents to [facility]  History of Present Illness  DUB  Endo bx today        Review of Systems   Constitutional: Negative for activity change, appetite change, fatigue and fever.   Respiratory: Negative for apnea and shortness of breath.    Cardiovascular: Negative for chest pain and palpitations.   Gastrointestinal: Negative for abdominal distention, abdominal pain, constipation, diarrhea, nausea and vomiting.   Endocrine: Negative for cold intolerance and heat intolerance.   Genitourinary: Negative for difficulty urinating, frequency, menstrual problem, pelvic pain, vaginal discharge and vaginal pain.        No further bleeding   Neurological: Negative for headaches.   Psychiatric/Behavioral: Negative for agitation and sleep disturbance.         Objective   Vital Signs:   /74   Ht 152.4 cm (60\")   Wt (!) 150 kg (330 lb 3.2 oz)   BMI 64.49 kg/m²     Physical Exam  Constitutional:       Appearance: She is well-developed.   HENT:      Head: Normocephalic.   Neck:      Trachea: No tracheal deviation.   Cardiovascular:      Rate and Rhythm: Normal rate.   Pulmonary:      Breath sounds: Normal breath sounds. No wheezing.   Abdominal:      Palpations: Abdomen is soft.      Tenderness: There is no abdominal tenderness.   Genitourinary:     Labia:         Right: No rash, tenderness or lesion.         Left: No rash, tenderness or lesion.       Vagina: No vaginal discharge, erythema or tenderness.      Cervix: No cervical motion tenderness or discharge.      Uterus: Not deviated, not enlarged and not tender.       Adnexa:         Right: No mass, tenderness or fullness.          Left: No mass, tenderness or fullness.        Rectum: Normal.   Skin:     General: Skin is warm and dry.    "   Findings: No rash.   Neurological:      Mental Status: She is alert and oriented to person, place, and time.   Psychiatric:         Speech: Speech normal.         Behavior: Behavior normal.         Result Review :   The following data was reviewed by: BEBE Ortega on 01/27/2023:    Data reviewed: Radiologic studies transvaginal US                Assessment and Plan    Endometrial Biopsy Procedure Note    Pre-operative Diagnosis: endometrial biopsy  Post-operative Diagnosis: same    Indications: abnormal uterine bleeding    Procedure Details    Urine pregnancy test was done and was NEGATIVE .  The risks (including infection, bleeding, pain, and uterine perforation) and benefits of the procedure were explained to the patient and Verbal informed consent was obtained.       The patient was placed in the dorsal lithotomy position. A speculum inserted in the vagina, and the cervix prepped with povidone iodine.       A sharp tenaculum was applied to the lip of the cervix for stabilization.  A sterile uterine sound was used to sound the uterus to a depth of 7.5 cm.  A Pipelle endometrial aspirator was used to sample the endometrium.  Sample was sent for pathologic examination.    Condition:  Stable    Complications:  None  Patient tolerated the procedure well without complications.    Plan:    The patient was advised to call for any fever or for prolonged or severe pain or bleeding. She was advised to use OTC acetaminophen and OTC ibuprofen as needed for mild to moderate pain. She was advised to avoid vaginal intercourse for 48 hours or until the bleeding has completely stopped.      Diagnoses and all orders for this visit:    1. DUB (dysfunctional uterine bleeding) (Primary)  -     POC Pregnancy, Urine  -     Tissue Pathology Exam          Class 3 Severe Obesity (BMI >=40). Obesity-related health conditions include the following: obstructive sleep apnea. Obesity is worsening. BMI is is above average; no BMI  management plan is appropriate. We discussed low calorie, low carb based diet program, portion control and increasing exercise.       Follow Up   Return for Annual physical.   Follow up based on results.     Patient was given instructions and counseling regarding her condition or for health maintenance advice. Please see specific information pulled into the AVS if appropriate.

## 2023-01-30 NOTE — PATIENT INSTRUCTIONS

## 2023-02-01 LAB
CYTO UR: NORMAL
LAB AP CASE REPORT: NORMAL
LAB AP CLINICAL INFORMATION: NORMAL
Lab: NORMAL
PATH REPORT.FINAL DX SPEC: NORMAL
PATH REPORT.GROSS SPEC: NORMAL

## 2023-03-01 ENCOUNTER — HOSPITAL ENCOUNTER (EMERGENCY)
Facility: HOSPITAL | Age: 38
Discharge: HOME | End: 2023-03-01
Attending: STUDENT IN AN ORGANIZED HEALTH CARE EDUCATION/TRAINING PROGRAM | Admitting: STUDENT IN AN ORGANIZED HEALTH CARE EDUCATION/TRAINING PROGRAM
Payer: COMMERCIAL

## 2023-03-01 ENCOUNTER — APPOINTMENT (OUTPATIENT)
Dept: CT IMAGING | Facility: HOSPITAL | Age: 38
End: 2023-03-01
Payer: COMMERCIAL

## 2023-03-01 VITALS
SYSTOLIC BLOOD PRESSURE: 161 MMHG | HEIGHT: 60 IN | DIASTOLIC BLOOD PRESSURE: 78 MMHG | BODY MASS INDEX: 57.52 KG/M2 | HEART RATE: 76 BPM | OXYGEN SATURATION: 97 % | TEMPERATURE: 98.5 F | RESPIRATION RATE: 18 BRPM | WEIGHT: 293 LBS

## 2023-03-01 DIAGNOSIS — R74.8 ELEVATED LIPASE: Primary | ICD-10-CM

## 2023-03-01 DIAGNOSIS — R10.84 GENERALIZED ABDOMINAL PAIN: ICD-10-CM

## 2023-03-01 LAB
ALBUMIN SERPL-MCNC: 3.7 G/DL (ref 3.5–5.2)
ALBUMIN/GLOB SERPL: 1.3 G/DL
ALP SERPL-CCNC: 106 U/L (ref 39–117)
ALT SERPL W P-5'-P-CCNC: 35 U/L (ref 1–33)
ANION GAP SERPL CALCULATED.3IONS-SCNC: 13 MMOL/L (ref 5–15)
AST SERPL-CCNC: 22 U/L (ref 1–32)
BASOPHILS # BLD AUTO: 0.03 10*3/MM3 (ref 0–0.2)
BASOPHILS NFR BLD AUTO: 0.4 % (ref 0–1.5)
BILIRUB SERPL-MCNC: 0.3 MG/DL (ref 0–1.2)
BUN SERPL-MCNC: 12 MG/DL (ref 6–20)
BUN/CREAT SERPL: 20.7 (ref 7–25)
CALCIUM SPEC-SCNC: 8.4 MG/DL (ref 8.6–10.5)
CHLORIDE SERPL-SCNC: 102 MMOL/L (ref 98–107)
CO2 SERPL-SCNC: 24 MMOL/L (ref 22–29)
CREAT SERPL-MCNC: 0.58 MG/DL (ref 0.57–1)
D-LACTATE SERPL-SCNC: 2 MMOL/L (ref 0.5–2)
DEPRECATED RDW RBC AUTO: 40.6 FL (ref 37–54)
EGFRCR SERPLBLD CKD-EPI 2021: 119 ML/MIN/1.73
EOSINOPHIL # BLD AUTO: 0.13 10*3/MM3 (ref 0–0.4)
EOSINOPHIL NFR BLD AUTO: 1.8 % (ref 0.3–6.2)
ERYTHROCYTE [DISTWIDTH] IN BLOOD BY AUTOMATED COUNT: 13.5 % (ref 12.3–15.4)
GLOBULIN UR ELPH-MCNC: 2.8 GM/DL
GLUCOSE SERPL-MCNC: 128 MG/DL (ref 65–99)
HCG SERPL QL: NEGATIVE
HCT VFR BLD AUTO: 36.7 % (ref 34–46.6)
HGB BLD-MCNC: 11.5 G/DL (ref 12–15.9)
HOLD SPECIMEN: NORMAL
HOLD SPECIMEN: NORMAL
IMM GRANULOCYTES # BLD AUTO: 0.03 10*3/MM3 (ref 0–0.05)
IMM GRANULOCYTES NFR BLD AUTO: 0.4 % (ref 0–0.5)
LIPASE SERPL-CCNC: 106 U/L (ref 13–60)
LYMPHOCYTES # BLD AUTO: 1.7 10*3/MM3 (ref 0.7–3.1)
LYMPHOCYTES NFR BLD AUTO: 22.9 % (ref 19.6–45.3)
MCH RBC QN AUTO: 26 PG (ref 26.6–33)
MCHC RBC AUTO-ENTMCNC: 31.3 G/DL (ref 31.5–35.7)
MCV RBC AUTO: 82.8 FL (ref 79–97)
MONOCYTES # BLD AUTO: 0.35 10*3/MM3 (ref 0.1–0.9)
MONOCYTES NFR BLD AUTO: 4.7 % (ref 5–12)
NEUTROPHILS NFR BLD AUTO: 5.18 10*3/MM3 (ref 1.7–7)
NEUTROPHILS NFR BLD AUTO: 69.8 % (ref 42.7–76)
NRBC BLD AUTO-RTO: 0 /100 WBC (ref 0–0.2)
PLATELET # BLD AUTO: 244 10*3/MM3 (ref 140–450)
PMV BLD AUTO: 9.3 FL (ref 6–12)
POTASSIUM SERPL-SCNC: 3.6 MMOL/L (ref 3.5–5.2)
PROT SERPL-MCNC: 6.5 G/DL (ref 6–8.5)
QT INTERVAL: 384 MS
QTC INTERVAL: 446 MS
RBC # BLD AUTO: 4.43 10*6/MM3 (ref 3.77–5.28)
SODIUM SERPL-SCNC: 139 MMOL/L (ref 136–145)
WBC NRBC COR # BLD: 7.42 10*3/MM3 (ref 3.4–10.8)
WHOLE BLOOD HOLD COAG: NORMAL
WHOLE BLOOD HOLD SPECIMEN: NORMAL

## 2023-03-01 PROCEDURE — 85025 COMPLETE CBC W/AUTO DIFF WBC: CPT | Performed by: STUDENT IN AN ORGANIZED HEALTH CARE EDUCATION/TRAINING PROGRAM

## 2023-03-01 PROCEDURE — 96375 TX/PRO/DX INJ NEW DRUG ADDON: CPT

## 2023-03-01 PROCEDURE — 96374 THER/PROPH/DIAG INJ IV PUSH: CPT

## 2023-03-01 PROCEDURE — 93005 ELECTROCARDIOGRAM TRACING: CPT | Performed by: STUDENT IN AN ORGANIZED HEALTH CARE EDUCATION/TRAINING PROGRAM

## 2023-03-01 PROCEDURE — 25010000002 ONDANSETRON PER 1 MG: Performed by: STUDENT IN AN ORGANIZED HEALTH CARE EDUCATION/TRAINING PROGRAM

## 2023-03-01 PROCEDURE — 93010 ELECTROCARDIOGRAM REPORT: CPT | Performed by: HOSPITALIST

## 2023-03-01 PROCEDURE — 84703 CHORIONIC GONADOTROPIN ASSAY: CPT | Performed by: STUDENT IN AN ORGANIZED HEALTH CARE EDUCATION/TRAINING PROGRAM

## 2023-03-01 PROCEDURE — 93005 ELECTROCARDIOGRAM TRACING: CPT

## 2023-03-01 PROCEDURE — 83690 ASSAY OF LIPASE: CPT | Performed by: STUDENT IN AN ORGANIZED HEALTH CARE EDUCATION/TRAINING PROGRAM

## 2023-03-01 PROCEDURE — 80053 COMPREHEN METABOLIC PANEL: CPT | Performed by: STUDENT IN AN ORGANIZED HEALTH CARE EDUCATION/TRAINING PROGRAM

## 2023-03-01 PROCEDURE — 83605 ASSAY OF LACTIC ACID: CPT | Performed by: STUDENT IN AN ORGANIZED HEALTH CARE EDUCATION/TRAINING PROGRAM

## 2023-03-01 PROCEDURE — 0 HYDROMORPHONE 1 MG/ML SOLUTION: Performed by: STUDENT IN AN ORGANIZED HEALTH CARE EDUCATION/TRAINING PROGRAM

## 2023-03-01 PROCEDURE — 74177 CT ABD & PELVIS W/CONTRAST: CPT

## 2023-03-01 PROCEDURE — 25510000001 IOPAMIDOL 61 % SOLUTION: Performed by: STUDENT IN AN ORGANIZED HEALTH CARE EDUCATION/TRAINING PROGRAM

## 2023-03-01 PROCEDURE — 99283 EMERGENCY DEPT VISIT LOW MDM: CPT

## 2023-03-01 RX ORDER — SODIUM CHLORIDE 0.9 % (FLUSH) 0.9 %
10 SYRINGE (ML) INJECTION AS NEEDED
Status: DISCONTINUED | OUTPATIENT
Start: 2023-03-01 | End: 2023-03-01 | Stop reason: HOSPADM

## 2023-03-01 RX ORDER — PANTOPRAZOLE SODIUM 40 MG/10ML
40 INJECTION, POWDER, LYOPHILIZED, FOR SOLUTION INTRAVENOUS ONCE
Status: COMPLETED | OUTPATIENT
Start: 2023-03-01 | End: 2023-03-01

## 2023-03-01 RX ORDER — ONDANSETRON 2 MG/ML
4 INJECTION INTRAMUSCULAR; INTRAVENOUS ONCE
Status: COMPLETED | OUTPATIENT
Start: 2023-03-01 | End: 2023-03-01

## 2023-03-01 RX ADMIN — SODIUM CHLORIDE, POTASSIUM CHLORIDE, SODIUM LACTATE AND CALCIUM CHLORIDE 1000 ML: 600; 310; 30; 20 INJECTION, SOLUTION INTRAVENOUS at 00:56

## 2023-03-01 RX ADMIN — ONDANSETRON 4 MG: 2 INJECTION INTRAMUSCULAR; INTRAVENOUS at 00:56

## 2023-03-01 RX ADMIN — PANTOPRAZOLE SODIUM 40 MG: 40 INJECTION, POWDER, FOR SOLUTION INTRAVENOUS at 00:56

## 2023-03-01 RX ADMIN — HYDROMORPHONE HYDROCHLORIDE 1 MG: 1 INJECTION, SOLUTION INTRAMUSCULAR; INTRAVENOUS; SUBCUTANEOUS at 00:56

## 2023-03-01 RX ADMIN — IOPAMIDOL 100 ML: 612 INJECTION, SOLUTION INTRAVENOUS at 01:25

## 2023-03-01 NOTE — DISCHARGE INSTRUCTIONS
It was very nice to meet you, Grant. Thank you for allowing us to take care of you today at [facility].    Your evaluation today did not show any emergent findings or have any emergent indications for admission to the hospital.  Your lipase was slightly elevated.  Please follow-up to have this repeated.  Please continue using fluids and Tylenol for further improvement.  Please use a bland diet over the next 24 to 48 hours for further improvement as well.    Please understand that an ER evaluation is just the start of your evaluation. We will do what we can, but we are often unable to fully figure out what is causing your symptoms from one evaluation. Thus, our primary goal is to determine whether you need to be evaluated in the hospital or if it is safe for you to go home and see other doctors such as a primary care physician or a specialist on an outpatient basis.     Like we discussed, it is VERY IMPORTANT that you follow up with your primary care doctor (call them to set up an appointment) within the next few days or as soon as possible so that you can be re-evaluated for improvement in your symptoms or for any other questions.     A copy of your results should be included in your paperwork. If you were prescribed any medications, please take them as directed or call us back with any questions.    Please return to the emergency room within 12-48 hours if you experience fever, chills, chest pain or shortness of breath, pain with inspiration/expiration, pain that travels to your arms, neck or back, nausea, vomiting, severe headache, tearing pain in your chest, dizziness, feel as though you are about to pass out, have any worsening symptoms, or any other concerns.    *IMPORTANT INFORMATION REGARDING XRAYS AND CT SCANS*  Please keep in mind that at nighttime we do not have an in-house radiologist and use a tele-radiology service. This means that while they provide us with information on emergent findings or  important acute findings, they may not report to us ALL of the other smaller, yet still important to know, findings that may be there on your imaging studies. While we will try our best to inform you about any incidental findings or abnormal findings that require follow up, please follow up with your primary care provider to have your imaging studies reviewed formally and have any abnormal findings addressed.

## 2023-03-05 NOTE — ED PROVIDER NOTES
Subjective   History of Present Illness  Patient states she was having severe gastric pain for the last 2 days and states that she had a stomach last.  States that she just felt to stay off came for further evaluation.  States she had multiple bowel surgeries in the past and has had abdominal hernia denies any current chest pain or shortness of breath or history of hematochezia.  States that she does feel slightly nauseous still having pain in epigastric area that goes.        Review of Systems   All other systems reviewed and are negative.      Past Medical History:   Diagnosis Date   • Abdominal hernia    • Anemia    • Anxiety    • Diverticulitis    • Fibroid    • GERD (gastroesophageal reflux disease)    • History of swelling of feet    • PMS (premenstrual syndrome)        No Known Allergies    Past Surgical History:   Procedure Laterality Date   • ADENOIDECTOMY     • APPENDECTOMY     • CHOLECYSTECTOMY      open   • HERNIA REPAIR      x3 living tissue   • LAPAROSCOPIC CHOLECYSTECTOMY     • LAPAROSCOPIC COLON RESECTION     • LYMPH NODE DISSECTION     • TONSILLECTOMY         Family History   Problem Relation Age of Onset   • Lupus Mother    • Osteoporosis Mother    • Rheum arthritis Mother    • Hypothyroidism Mother    • COPD Father    • Coronary artery disease Father    • Emphysema Father    • Hypertension Father    • Hyperlipidemia Father    • Hypothyroidism Father    • Rheum arthritis Brother    • Hypereosinophilic syndrome Brother    • Crohn's disease Brother    • Breast cancer Paternal Aunt    • Colon cancer Paternal Aunt    • Colon cancer Paternal Aunt    • Hypertension Maternal Grandmother    • Stroke Maternal Grandmother    • Cancer Maternal Grandfather    • Aneurysm Paternal Grandmother    • Heart disease Paternal Grandfather    • Heart attack Paternal Grandfather    • Ovarian cancer Neg Hx    • Uterine cancer Neg Hx        Social History     Socioeconomic History   • Marital status: Single   Tobacco Use    • Smoking status: Never   • Smokeless tobacco: Never   Substance and Sexual Activity   • Alcohol use: Yes     Alcohol/week: 2.0 standard drinks     Types: 1 Glasses of wine, 1 Cans of beer per week     Comment: rarely   • Drug use: No   • Sexual activity: Yes     Partners: Male     Birth control/protection: OCP           Objective   Physical Exam  Vitals and nursing note reviewed.   Constitutional:       General: She is not in acute distress.     Appearance: Normal appearance. She is not toxic-appearing or diaphoretic.   HENT:      Head: Normocephalic and atraumatic.      Nose: Nose normal.   Eyes:      General:         Right eye: No discharge.         Left eye: No discharge.      Extraocular Movements: Extraocular movements intact.      Conjunctiva/sclera: Conjunctivae normal.   Cardiovascular:      Rate and Rhythm: Normal rate.   Pulmonary:      Effort: Pulmonary effort is normal. No respiratory distress.      Breath sounds: No rhonchi.   Abdominal:      General: Abdomen is flat. Bowel sounds are normal.      Palpations: Abdomen is soft.      Tenderness: There is abdominal tenderness in the epigastric area. There is no guarding or rebound.   Musculoskeletal:         General: Normal range of motion.      Cervical back: Normal range of motion.   Skin:     General: Skin is warm.   Neurological:      General: No focal deficit present.      Mental Status: She is alert and oriented to person, place, and time. Mental status is at baseline.   Psychiatric:         Mood and Affect: Mood normal.         Behavior: Behavior normal.         Thought Content: Thought content normal.         Judgment: Judgment normal.         Procedures           ED Course                                           Medical Decision Making  Grant Earl is a 37 y.o. female who presents to the ED for abdominal pain.     Patient was non-toxic appearing on arrival.    Vital signs stable on arrival.     Patient's presentation raises suspicion for  differentials including, but not limited to, GERD, obstruction, colitis.     Given this, Grant was placed on the monitor and IV access was obtained as needed. Laboratory studies and imaging studies were ordered.     Given findings described above, patient's presentation is most consistent with possible mild pancreatitis with an elevated lipase. I have a low suspicion for vascular pathology at this point in their ED course.      The patient was given fluids and pain medication and antiemetics for symptom control. On re-evaluation, patient remained hemodynamically stable.     I went over the workup and results so far and encouraged follow up as her CT does not show any acute emergent abnormalities. I answered all the questions regarding the emergency department evaluation, diagnosis, and treatment plan in plain and simple language that was understandable. I said that there is always some diagnostic uncertainty in the ER and went over the fact that the symptoms may change or new symptoms may reveal themselves after being discharged. Because of this, I said that it is VERY IMPORTANT that Grant follows up, by CALLING as soon as possible to set up an appointment, with the primary care doctor within the next few days or as soon as reasonably possible so that the symptoms can be re-evaluated for improvement or for any other questions. I also gave Grant common sense return precautions and encouraged a quick return to the emergency department within 24 - 48hrs if there are any new, worsening, or concerning symptoms. The patient verbalized understanding of the discharge instructions and agreed with them. Grant was discharged in stable condition and was observed ambulating out of the ER.    Elevated lipase: complicated acute illness or injury  Generalized abdominal pain: complicated acute illness or injury  Amount and/or Complexity of Data Reviewed  Labs: ordered.  Radiology: ordered.  ECG/medicine tests:  ordered.      Risk  Prescription drug management.          Final diagnoses:   Elevated lipase   Generalized abdominal pain       ED Disposition  ED Disposition     ED Disposition   Discharge    Condition   Stable    Comment   --             Douglas Carnes MD  [address]   [place] [address]  [phone]    Call in 1 day  As needed, If symptoms worsen         Medication List      Changed    dicyclomine 20 MG tablet  Commonly known as: BENTYL  Take 1 tablet by mouth Every 6 (Six) Hours.  What changed:   · when to take this  · reasons to take this             Socorro Sierra MD  03/05/23 0834

## 2023-03-06 ENCOUNTER — OFFICE VISIT (OUTPATIENT)
Dept: OBSTETRICS AND GYNECOLOGY | Facility: CLINIC | Age: 38
End: 2023-03-06
Payer: COMMERCIAL

## 2023-03-06 VITALS
BODY MASS INDEX: 57.52 KG/M2 | DIASTOLIC BLOOD PRESSURE: 84 MMHG | SYSTOLIC BLOOD PRESSURE: 116 MMHG | HEIGHT: 60 IN | WEIGHT: 293 LBS

## 2023-03-06 DIAGNOSIS — E66.01 MORBID OBESITY WITH BODY MASS INDEX (BMI) OF 60.0 TO 69.9 IN ADULT: ICD-10-CM

## 2023-03-06 DIAGNOSIS — Z01.419 ENCOUNTER FOR GYNECOLOGICAL EXAMINATION: Primary | ICD-10-CM

## 2023-03-06 DIAGNOSIS — N93.8 DUB (DYSFUNCTIONAL UTERINE BLEEDING): ICD-10-CM

## 2023-03-06 DIAGNOSIS — Z12.31 SCREENING MAMMOGRAM, ENCOUNTER FOR: ICD-10-CM

## 2023-03-06 DIAGNOSIS — L30.4 INTERTRIGO: ICD-10-CM

## 2023-03-06 PROCEDURE — 99395 PREV VISIT EST AGE 18-39: CPT | Performed by: NURSE PRACTITIONER

## 2023-03-06 PROCEDURE — 87624 HPV HI-RISK TYP POOLED RSLT: CPT | Performed by: NURSE PRACTITIONER

## 2023-03-06 PROCEDURE — G0123 SCREEN CERV/VAG THIN LAYER: HCPCS | Performed by: NURSE PRACTITIONER

## 2023-03-06 RX ORDER — NYSTATIN 100000 U/G
1 CREAM TOPICAL 2 TIMES DAILY
Qty: 30 G | Refills: 0 | Status: SHIPPED | OUTPATIENT
Start: 2023-03-06

## 2023-03-06 NOTE — PROGRESS NOTES
"Chief Complaint  Annual Exam (Pt is here for an annual exam.  Pt has not yet scheduled an OV with MD to discuss endo bx results and POC.  Pt has no complaints today. )    Subjective     {Problem List  Visit Diagnosis   Encounters  Notes  Medications  Labs  Result Review Imaging  Media :23}     Grant Earl presents to [facility]  History of Present Illness    Review of Systems     Objective   Vital Signs:   /84   Ht 152.4 cm (60\")   Wt (!) 148 kg (327 lb)   BMI 63.86 kg/m²     Physical Exam    Result Review :{Labs  Result Review  Imaging  Med Tab  Media  Procedures :23}   {The following data was reviewed by (Optional):05799}  {Ambulatory Labs (Optional):34525}  {Data reviewed (Optional):06410:::1}              Assessment and Plan {CC Problem List  Visit Diagnosis   ROS  Review (Popup)  Health Maintenance  Quality  BestPractice  Medications  SmartSets  SnapShot Encounters  Media :23}     Well woman GYN exam.   Pap smear done per ASCCP guidelines.   Will have lab work at     Encouraged SBE, pt is aware how to do self breast exam and the importance of same.   Discussed weight management and importance of maintaining a healthy weight.   Discussed Vitamin D intake and the importance of adequate vitamin D for both Bone Health and a healthy immune system.    Discussed Daily exercise and the importance of same, in regards to a healthy heart as well as helping to maintain her weight and improving her mental health.     Colonoscopy is up to date    Discussed STD prevention and testing.   Pt declines Chlamydia/Gonorrhea/Trichomonas, RPR, Hep panel and HIV testing.       Mammogram will be scheduled at [facility].     Diagnoses and all orders for this visit:    1. Encounter for gynecological examination (Primary)    2. Screening mammogram, encounter for      {Time Spent (Optional):61671}    {Class 3 Severe Obesity (BMI >=40).:[id number]}       Follow Up {Instructions Charge " Capture  Follow-up Communications :23}  No follow-ups on file.    Patient was given instructions and counseling regarding her condition or for health maintenance advice. Please see specific information pulled into the AVS if appropriate.

## 2023-03-07 LAB
GEN CATEG CVX/VAG CYTO-IMP: NORMAL
HPV I/H RISK 4 DNA CVX QL PROBE+SIG AMP: NOT DETECTED
LAB AP CASE REPORT: NORMAL
LAB AP GYN ADDITIONAL INFORMATION: NORMAL
Lab: NORMAL
PATH INTERP SPEC-IMP: NORMAL
STAT OF ADQ CVX/VAG CYTO-IMP: NORMAL

## 2023-03-07 NOTE — PATIENT INSTRUCTIONS

## 2023-03-07 NOTE — PROGRESS NOTES
"Chief Complaint  Annual Exam (Pt is here for an annual exam.  Pt has not yet scheduled an OV with MD to discuss endo bx results and POC.  Pt has no complaints today. )    Subjective          Grant Earl presents to [facility]  History of Present Illness    The patient is to follow up with medical doctor related to endometrial biopsy and plan of care.    She denies any new concerns and would like to discuss her old concerns.   She has been having a menstrual period about every 2 weeks.   The patient has had 2 menstrual cycles since the endometrial biopsy, and the bleeding was normal.  She denies spotting in between; however, she experiences significant abdominal cramping due to having various conditions.      She went to the emergency room last Wednesday, 03/01/2023, and obtained a CT scan th  She was taking Saxenda for about 1.5 months to help with weight loss, but it caused her to have GI distress over the last week.  Her pancreatic enzymes were elevated, so the ER physician suggested stopping it until she follows up with her regular physician.   She has an appointment with her primary care physician today after this visit.    She has a yeast-like fold irritation right underneath her abdomen and on her thigh.   She has been using an antimicrobial soap and applying triple antibiotic ointment.  She also has intermittent symptoms behind her knees.    Review of Systems     Objective   Vital Signs:   /84   Ht 152.4 cm (60\")   Wt (!) 148 kg (327 lb)   BMI 63.86 kg/m²     Physical Exam  Vitals and nursing note reviewed. Exam conducted with a chaperone present.   Constitutional:       General: She is not in acute distress.     Appearance: She is well-developed.   HENT:      Head: Normocephalic and atraumatic.   Cardiovascular:      Rate and Rhythm: Normal rate and regular rhythm.      Heart sounds: No murmur heard.  Pulmonary:      Effort: Pulmonary effort is normal.      Breath sounds: " Normal breath sounds.   Chest:   Breasts:     Right: No inverted nipple or mass.      Left: No inverted nipple or mass.   Abdominal:      General: There is no distension.      Palpations: Abdomen is soft.      Tenderness: There is no abdominal tenderness.   Genitourinary:     Exam position: Lithotomy position.      Labia:         Right: No tenderness or lesion.         Left: No tenderness or lesion.       Vagina: Normal. No vaginal discharge, tenderness or bleeding.      Cervix: No cervical motion tenderness, discharge or friability.      Adnexa:         Right: No tenderness or fullness.          Left: No tenderness or fullness.            Comments: Mild irritation noted    Difficult to assess bimanually related to habitus  Musculoskeletal:         General: Normal range of motion.      Cervical back: Normal range of motion and neck supple.   Skin:     General: Skin is warm and dry.   Neurological:      Mental Status: She is alert and oriented to person, place, and time.   Psychiatric:         Behavior: Behavior normal.         Judgment: Judgment normal.         Result Review :                     Assessment and Plan      Well woman GYN exam.   Pap smear done per ASCCP guidelines.   Will have lab work at PCP.    Encouraged SBE, pt is aware how to do self breast exam and the importance of same.   Discussed weight management and importance of maintaining a healthy weight.   Discussed Vitamin D intake and the importance of adequate vitamin D for both Bone Health and a healthy immune system.    Discussed Daily exercise and the importance of same, in regards to a healthy heart as well as helping to maintain her weight and improving her mental health.     Colonoscopy is up to date    Discussed STD prevention and testing.   Pt declines Chlamydia/Gonorrhea/Trichomonas, RPR, Hep panel and HIV testing.     Mammogram will be scheduled at [facility].     Intertrigo  Patient has been prescribed with nystatin to use once to twice a day  or as needed.  Recommend to keep the area dry.     Diagnoses and all orders for this visit:    1. Encounter for gynecological examination (Primary)  -     Liquid-based Pap Smear, Screening  -     HPV DNA Probe, Direct - ThinPrep Vial, Cervix, Endocervix    2. Screening mammogram, encounter for  -     Mammo Screening Digital Tomosynthesis Bilateral With CAD; Future    3. DUB (dysfunctional uterine bleeding)    4. Morbid obesity with body mass index (BMI) of 60.0 to 69.9 in adult (HCC)    5. Intertrigo    Other orders  -     nystatin (MYCOSTATIN) 989395 UNIT/GM cream; Apply 1 application topically to the appropriate area as directed 2 (Two) Times a Day.  Dispense: 30 g; Refill: 0          Class 3 Severe Obesity (BMI >=40). Obesity-related health conditions include the following: obstructive sleep apnea. Obesity is improving with treatment. BMI is is above average; no BMI management plan is appropriate. We discussed low calorie, low carb based diet program, portion control and increasing exercise.       Follow Up   Return for Annual physical.   Consult with MD.     Patient was given instructions and counseling regarding her condition or for health maintenance advice. Please see specific information pulled into the AVS if appropriate.       Transcribed from ambient dictation for BEBE Ortega by Lias Adams.  03/06/23   19:51 CST    Patient or patient representative verbalized consent to the visit recording.  I have personally performed the services described in this document as transcribed by the above individual, and it is both accurate and complete.  BEBE Ortega  3/7/2023  17:11 CST

## 2023-03-23 ENCOUNTER — TELEPHONE (OUTPATIENT)
Dept: OBSTETRICS AND GYNECOLOGY | Facility: CLINIC | Age: 38
End: 2023-03-23
Payer: COMMERCIAL

## 2023-04-26 ENCOUNTER — TELEPHONE (OUTPATIENT)
Dept: OBSTETRICS AND GYNECOLOGY | Facility: CLINIC | Age: 38
End: 2023-04-26

## 2023-04-26 NOTE — TELEPHONE ENCOUNTER
Provider: DR SIDDIQUI  Caller:MARCELINO CORDON  Relationship to Patient: SELF  Phone Number: [phone]  Reason for Call: SAME DAY CANCELLATION//NOT ABLE TO GET OFF WORK//COULD RESCHEDULE DOESN'T HAVE HER CALENDAR WITH HER WILL CALL BACK

## 2023-09-20 ENCOUNTER — APPOINTMENT (OUTPATIENT)
Dept: CARDIOLOGY | Facility: HOSPITAL | Age: 38
End: 2023-09-20
Payer: COMMERCIAL

## 2023-09-20 ENCOUNTER — APPOINTMENT (OUTPATIENT)
Dept: ULTRASOUND IMAGING | Facility: HOSPITAL | Age: 38
End: 2023-09-20
Payer: COMMERCIAL

## 2023-09-20 ENCOUNTER — APPOINTMENT (OUTPATIENT)
Dept: CT IMAGING | Facility: HOSPITAL | Age: 38
End: 2023-09-20
Payer: COMMERCIAL

## 2023-09-20 ENCOUNTER — HOSPITAL ENCOUNTER (OUTPATIENT)
Facility: HOSPITAL | Age: 38
Setting detail: OBSERVATION
LOS: 1 days | Discharge: HOME | End: 2023-09-21
Attending: EMERGENCY MEDICINE | Admitting: STUDENT IN AN ORGANIZED HEALTH CARE EDUCATION/TRAINING PROGRAM
Payer: COMMERCIAL

## 2023-09-20 ENCOUNTER — APPOINTMENT (OUTPATIENT)
Dept: GENERAL RADIOLOGY | Facility: HOSPITAL | Age: 38
End: 2023-09-20
Payer: COMMERCIAL

## 2023-09-20 DIAGNOSIS — I26.94 MULTIPLE SUBSEGMENTAL PULMONARY EMBOLI WITHOUT ACUTE COR PULMONALE: Primary | ICD-10-CM

## 2023-09-20 PROBLEM — I26.99 PULMONARY EMBOLI: Status: ACTIVE | Noted: 2023-09-20

## 2023-09-20 PROBLEM — I26.99 PULMONARY EMBOLISM: Status: ACTIVE | Noted: 2023-09-20

## 2023-09-20 LAB
ALBUMIN SERPL-MCNC: 4 G/DL (ref 3.5–5.2)
ALBUMIN/GLOB SERPL: 1.3 G/DL
ALP SERPL-CCNC: 108 U/L (ref 39–117)
ALT SERPL W P-5'-P-CCNC: 60 U/L (ref 1–33)
ANION GAP SERPL CALCULATED.3IONS-SCNC: 11 MMOL/L (ref 5–15)
AST SERPL-CCNC: 51 U/L (ref 1–32)
B-HCG UR QL: NEGATIVE
BASOPHILS # BLD AUTO: 0.04 10*3/MM3 (ref 0–0.2)
BASOPHILS NFR BLD AUTO: 0.6 % (ref 0–1.5)
BH CV ECHO MEAS - AO MAX PG: 6.3 MMHG
BH CV ECHO MEAS - AO MEAN PG: 4 MMHG
BH CV ECHO MEAS - AO ROOT DIAM: 3.1 CM
BH CV ECHO MEAS - AO V2 MAX: 125 CM/SEC
BH CV ECHO MEAS - AO V2 VTI: 28.4 CM
BH CV ECHO MEAS - AVA(I,D): 3.7 CM2
BH CV ECHO MEAS - EDV(CUBED): 79.5 ML
BH CV ECHO MEAS - EDV(MOD-SP4): 109 ML
BH CV ECHO MEAS - EF(MOD-SP4): 73.2 %
BH CV ECHO MEAS - ESV(CUBED): 46.7 ML
BH CV ECHO MEAS - ESV(MOD-SP4): 29.2 ML
BH CV ECHO MEAS - FS: 16.3 %
BH CV ECHO MEAS - IVS/LVPW: 1.08 CM
BH CV ECHO MEAS - IVSD: 1.3 CM
BH CV ECHO MEAS - LA DIMENSION: 3.9 CM
BH CV ECHO MEAS - LAT PEAK E' VEL: 12.1 CM/SEC
BH CV ECHO MEAS - LV DIASTOLIC VOL/BSA (35-75): 47.8 CM2
BH CV ECHO MEAS - LV MASS(C)D: 196.1 GRAMS
BH CV ECHO MEAS - LV MAX PG: 3.3 MMHG
BH CV ECHO MEAS - LV MEAN PG: 2 MMHG
BH CV ECHO MEAS - LV SYSTOLIC VOL/BSA (12-30): 12.8 CM2
BH CV ECHO MEAS - LV V1 MAX: 90.2 CM/SEC
BH CV ECHO MEAS - LV V1 VTI: 23.3 CM
BH CV ECHO MEAS - LVIDD: 4.3 CM
BH CV ECHO MEAS - LVIDS: 3.6 CM
BH CV ECHO MEAS - LVOT AREA: 4.5 CM2
BH CV ECHO MEAS - LVOT DIAM: 2.4 CM
BH CV ECHO MEAS - LVPWD: 1.2 CM
BH CV ECHO MEAS - MED PEAK E' VEL: 9.6 CM/SEC
BH CV ECHO MEAS - MV A MAX VEL: 56.3 CM/SEC
BH CV ECHO MEAS - MV DEC SLOPE: 489 CM/SEC2
BH CV ECHO MEAS - MV DEC TIME: 0.17 SEC
BH CV ECHO MEAS - MV E MAX VEL: 81 CM/SEC
BH CV ECHO MEAS - MV E/A: 1.44
BH CV ECHO MEAS - PA V2 MAX: 109 CM/SEC
BH CV ECHO MEAS - SI(MOD-SP4): 35 ML/M2
BH CV ECHO MEAS - SV(LVOT): 105.4 ML
BH CV ECHO MEAS - SV(MOD-SP4): 79.8 ML
BH CV ECHO MEAS - TAPSE (>1.6): 2.19 CM
BH CV ECHO MEASUREMENTS AVERAGE E/E' RATIO: 7.47
BH CV XLRA - TDI S': 16.3 CM/SEC
BILIRUB SERPL-MCNC: 0.4 MG/DL (ref 0–1.2)
BILIRUB UR QL STRIP: NEGATIVE
BUN SERPL-MCNC: 13 MG/DL (ref 6–20)
BUN/CREAT SERPL: 21.3 (ref 7–25)
CALCIUM SPEC-SCNC: 8.5 MG/DL (ref 8.6–10.5)
CHLORIDE SERPL-SCNC: 102 MMOL/L (ref 98–107)
CHOLEST SERPL-MCNC: 194 MG/DL (ref 0–200)
CLARITY UR: CLEAR
CO2 SERPL-SCNC: 21 MMOL/L (ref 22–29)
COLOR UR: YELLOW
CREAT SERPL-MCNC: 0.61 MG/DL (ref 0.57–1)
D DIMER PPP FEU-MCNC: 1.73 MCGFEU/ML (ref 0–0.5)
DEPRECATED RDW RBC AUTO: 43.2 FL (ref 37–54)
EGFRCR SERPLBLD CKD-EPI 2021: 117.5 ML/MIN/1.73
EOSINOPHIL # BLD AUTO: 0.16 10*3/MM3 (ref 0–0.4)
EOSINOPHIL NFR BLD AUTO: 2.3 % (ref 0.3–6.2)
ERYTHROCYTE [DISTWIDTH] IN BLOOD BY AUTOMATED COUNT: 14.4 % (ref 12.3–15.4)
EXPIRATION DATE: NORMAL
GEN 5 2HR TROPONIN T REFLEX: <6 NG/L
GLOBULIN UR ELPH-MCNC: 3.1 GM/DL
GLUCOSE SERPL-MCNC: 106 MG/DL (ref 65–99)
GLUCOSE UR STRIP-MCNC: NEGATIVE MG/DL
HBA1C MFR BLD: 5.7 % (ref 4.8–5.6)
HCT VFR BLD AUTO: 37.6 % (ref 34–46.6)
HDLC SERPL-MCNC: 32 MG/DL (ref 40–60)
HGB BLD-MCNC: 11.5 G/DL (ref 12–15.9)
HGB UR QL STRIP.AUTO: NEGATIVE
HOLD SPECIMEN: NORMAL
HOLD SPECIMEN: NORMAL
IMM GRANULOCYTES # BLD AUTO: 0.03 10*3/MM3 (ref 0–0.05)
IMM GRANULOCYTES NFR BLD AUTO: 0.4 % (ref 0–0.5)
INTERNAL NEGATIVE CONTROL: NEGATIVE
INTERNAL POSITIVE CONTROL: POSITIVE
KETONES UR QL STRIP: NEGATIVE
LDLC SERPL CALC-MCNC: 130 MG/DL (ref 0–100)
LDLC/HDLC SERPL: 3.94 {RATIO}
LEUKOCYTE ESTERASE UR QL STRIP.AUTO: NEGATIVE
LIPASE SERPL-CCNC: 15 U/L (ref 13–60)
LYMPHOCYTES # BLD AUTO: 1.47 10*3/MM3 (ref 0.7–3.1)
LYMPHOCYTES NFR BLD AUTO: 21.4 % (ref 19.6–45.3)
Lab: NORMAL
MCH RBC QN AUTO: 25.4 PG (ref 26.6–33)
MCHC RBC AUTO-ENTMCNC: 30.6 G/DL (ref 31.5–35.7)
MCV RBC AUTO: 83 FL (ref 79–97)
MONOCYTES # BLD AUTO: 0.36 10*3/MM3 (ref 0.1–0.9)
MONOCYTES NFR BLD AUTO: 5.2 % (ref 5–12)
NEUTROPHILS NFR BLD AUTO: 4.81 10*3/MM3 (ref 1.7–7)
NEUTROPHILS NFR BLD AUTO: 70.1 % (ref 42.7–76)
NITRITE UR QL STRIP: NEGATIVE
NRBC BLD AUTO-RTO: 0 /100 WBC (ref 0–0.2)
PH UR STRIP.AUTO: 6 [PH] (ref 5–8)
PLATELET # BLD AUTO: 241 10*3/MM3 (ref 140–450)
PMV BLD AUTO: 9.4 FL (ref 6–12)
POTASSIUM SERPL-SCNC: 4 MMOL/L (ref 3.5–5.2)
PROT SERPL-MCNC: 7.1 G/DL (ref 6–8.5)
PROT UR QL STRIP: NEGATIVE
RBC # BLD AUTO: 4.53 10*6/MM3 (ref 3.77–5.28)
SODIUM SERPL-SCNC: 134 MMOL/L (ref 136–145)
SP GR UR STRIP: 1.02 (ref 1–1.03)
TRIGL SERPL-MCNC: 179 MG/DL (ref 0–150)
TROPONIN T DELTA: NORMAL
TROPONIN T SERPL HS-MCNC: <6 NG/L
UROBILINOGEN UR QL STRIP: NORMAL
VLDLC SERPL-MCNC: 32 MG/DL (ref 5–40)
WBC NRBC COR # BLD: 6.87 10*3/MM3 (ref 3.4–10.8)
WHOLE BLOOD HOLD COAG: NORMAL
WHOLE BLOOD HOLD SPECIMEN: NORMAL

## 2023-09-20 PROCEDURE — 96376 TX/PRO/DX INJ SAME DRUG ADON: CPT

## 2023-09-20 PROCEDURE — 84484 ASSAY OF TROPONIN QUANT: CPT | Performed by: EMERGENCY MEDICINE

## 2023-09-20 PROCEDURE — 0 HYDROMORPHONE 1 MG/ML SOLUTION: Performed by: EMERGENCY MEDICINE

## 2023-09-20 PROCEDURE — 93306 TTE W/DOPPLER COMPLETE: CPT

## 2023-09-20 PROCEDURE — G0378 HOSPITAL OBSERVATION PER HR: HCPCS

## 2023-09-20 PROCEDURE — 96374 THER/PROPH/DIAG INJ IV PUSH: CPT

## 2023-09-20 PROCEDURE — 74177 CT ABD & PELVIS W/CONTRAST: CPT

## 2023-09-20 PROCEDURE — 25010000002 MORPHINE PER 10 MG: Performed by: STUDENT IN AN ORGANIZED HEALTH CARE EDUCATION/TRAINING PROGRAM

## 2023-09-20 PROCEDURE — 81241 F5 GENE: CPT | Performed by: STUDENT IN AN ORGANIZED HEALTH CARE EDUCATION/TRAINING PROGRAM

## 2023-09-20 PROCEDURE — 85705 THROMBOPLASTIN INHIBITION: CPT | Performed by: STUDENT IN AN ORGANIZED HEALTH CARE EDUCATION/TRAINING PROGRAM

## 2023-09-20 PROCEDURE — 85670 THROMBIN TIME PLASMA: CPT | Performed by: STUDENT IN AN ORGANIZED HEALTH CARE EDUCATION/TRAINING PROGRAM

## 2023-09-20 PROCEDURE — 85303 CLOT INHIBIT PROT C ACTIVITY: CPT | Performed by: STUDENT IN AN ORGANIZED HEALTH CARE EDUCATION/TRAINING PROGRAM

## 2023-09-20 PROCEDURE — 85302 CLOT INHIBIT PROT C ANTIGEN: CPT | Performed by: STUDENT IN AN ORGANIZED HEALTH CARE EDUCATION/TRAINING PROGRAM

## 2023-09-20 PROCEDURE — 85305 CLOT INHIBIT PROT S TOTAL: CPT | Performed by: STUDENT IN AN ORGANIZED HEALTH CARE EDUCATION/TRAINING PROGRAM

## 2023-09-20 PROCEDURE — 83036 HEMOGLOBIN GLYCOSYLATED A1C: CPT | Performed by: STUDENT IN AN ORGANIZED HEALTH CARE EDUCATION/TRAINING PROGRAM

## 2023-09-20 PROCEDURE — 85613 RUSSELL VIPER VENOM DILUTED: CPT | Performed by: STUDENT IN AN ORGANIZED HEALTH CARE EDUCATION/TRAINING PROGRAM

## 2023-09-20 PROCEDURE — 85306 CLOT INHIBIT PROT S FREE: CPT | Performed by: STUDENT IN AN ORGANIZED HEALTH CARE EDUCATION/TRAINING PROGRAM

## 2023-09-20 PROCEDURE — 71045 X-RAY EXAM CHEST 1 VIEW: CPT

## 2023-09-20 PROCEDURE — 25510000001 IOPAMIDOL PER 1 ML: Performed by: EMERGENCY MEDICINE

## 2023-09-20 PROCEDURE — 93970 EXTREMITY STUDY: CPT

## 2023-09-20 PROCEDURE — 71275 CT ANGIOGRAPHY CHEST: CPT

## 2023-09-20 PROCEDURE — 36415 COLL VENOUS BLD VENIPUNCTURE: CPT | Performed by: STUDENT IN AN ORGANIZED HEALTH CARE EDUCATION/TRAINING PROGRAM

## 2023-09-20 PROCEDURE — 96375 TX/PRO/DX INJ NEW DRUG ADDON: CPT

## 2023-09-20 PROCEDURE — 25010000002 MORPHINE PER 10 MG: Performed by: EMERGENCY MEDICINE

## 2023-09-20 PROCEDURE — 25010000002 ENOXAPARIN PER 10 MG: Performed by: EMERGENCY MEDICINE

## 2023-09-20 PROCEDURE — 96372 THER/PROPH/DIAG INJ SC/IM: CPT

## 2023-09-20 PROCEDURE — 85379 FIBRIN DEGRADATION QUANT: CPT | Performed by: EMERGENCY MEDICINE

## 2023-09-20 PROCEDURE — 93306 TTE W/DOPPLER COMPLETE: CPT | Performed by: INTERNAL MEDICINE

## 2023-09-20 PROCEDURE — 81003 URINALYSIS AUTO W/O SCOPE: CPT | Performed by: EMERGENCY MEDICINE

## 2023-09-20 PROCEDURE — 81025 URINE PREGNANCY TEST: CPT | Performed by: EMERGENCY MEDICINE

## 2023-09-20 PROCEDURE — 85025 COMPLETE CBC W/AUTO DIFF WBC: CPT | Performed by: EMERGENCY MEDICINE

## 2023-09-20 PROCEDURE — 25510000001 PERFLUTREN 6.52 MG/ML SUSPENSION: Performed by: STUDENT IN AN ORGANIZED HEALTH CARE EDUCATION/TRAINING PROGRAM

## 2023-09-20 PROCEDURE — 83090 ASSAY OF HOMOCYSTEINE: CPT | Performed by: STUDENT IN AN ORGANIZED HEALTH CARE EDUCATION/TRAINING PROGRAM

## 2023-09-20 PROCEDURE — 93010 ELECTROCARDIOGRAM REPORT: CPT | Performed by: INTERNAL MEDICINE

## 2023-09-20 PROCEDURE — 93005 ELECTROCARDIOGRAM TRACING: CPT | Performed by: EMERGENCY MEDICINE

## 2023-09-20 PROCEDURE — 80053 COMPREHEN METABOLIC PANEL: CPT | Performed by: EMERGENCY MEDICINE

## 2023-09-20 PROCEDURE — 83690 ASSAY OF LIPASE: CPT | Performed by: EMERGENCY MEDICINE

## 2023-09-20 PROCEDURE — 93970 EXTREMITY STUDY: CPT | Performed by: SURGERY

## 2023-09-20 PROCEDURE — 94664 DEMO&/EVAL PT USE INHALER: CPT

## 2023-09-20 PROCEDURE — 85732 THROMBOPLASTIN TIME PARTIAL: CPT | Performed by: STUDENT IN AN ORGANIZED HEALTH CARE EDUCATION/TRAINING PROGRAM

## 2023-09-20 PROCEDURE — 80061 LIPID PANEL: CPT | Performed by: STUDENT IN AN ORGANIZED HEALTH CARE EDUCATION/TRAINING PROGRAM

## 2023-09-20 PROCEDURE — 25010000002 ONDANSETRON PER 1 MG: Performed by: EMERGENCY MEDICINE

## 2023-09-20 PROCEDURE — 99285 EMERGENCY DEPT VISIT HI MDM: CPT

## 2023-09-20 RX ORDER — SODIUM CHLORIDE 0.9 % (FLUSH) 0.9 %
10 SYRINGE (ML) INJECTION EVERY 12 HOURS SCHEDULED
Status: DISCONTINUED | OUTPATIENT
Start: 2023-09-20 | End: 2023-09-21 | Stop reason: HOSPADM

## 2023-09-20 RX ORDER — SODIUM CHLORIDE 0.9 % (FLUSH) 0.9 %
10 SYRINGE (ML) INJECTION AS NEEDED
Status: DISCONTINUED | OUTPATIENT
Start: 2023-09-20 | End: 2023-09-21 | Stop reason: HOSPADM

## 2023-09-20 RX ORDER — CITALOPRAM 20 MG/1
20 TABLET ORAL DAILY
Status: DISCONTINUED | OUTPATIENT
Start: 2023-09-20 | End: 2023-09-21 | Stop reason: HOSPADM

## 2023-09-20 RX ORDER — MORPHINE SULFATE 2 MG/ML
1 INJECTION, SOLUTION INTRAMUSCULAR; INTRAVENOUS EVERY 4 HOURS PRN
Status: DISCONTINUED | OUTPATIENT
Start: 2023-09-20 | End: 2023-09-21 | Stop reason: HOSPADM

## 2023-09-20 RX ORDER — CHOLECALCIFEROL (VITAMIN D3) 125 MCG
5 CAPSULE ORAL NIGHTLY PRN
Status: DISCONTINUED | OUTPATIENT
Start: 2023-09-20 | End: 2023-09-21 | Stop reason: HOSPADM

## 2023-09-20 RX ORDER — ACETAMINOPHEN 650 MG/1
650 SUPPOSITORY RECTAL EVERY 4 HOURS PRN
Status: DISCONTINUED | OUTPATIENT
Start: 2023-09-20 | End: 2023-09-21 | Stop reason: HOSPADM

## 2023-09-20 RX ORDER — MULTIPLE VITAMINS W/ MINERALS TAB 9MG-400MCG
1 TAB ORAL DAILY
Status: DISCONTINUED | OUTPATIENT
Start: 2023-09-20 | End: 2023-09-21 | Stop reason: HOSPADM

## 2023-09-20 RX ORDER — ONDANSETRON 2 MG/ML
4 INJECTION INTRAMUSCULAR; INTRAVENOUS ONCE
Status: COMPLETED | OUTPATIENT
Start: 2023-09-20 | End: 2023-09-20

## 2023-09-20 RX ORDER — NITROGLYCERIN 0.4 MG/1
0.4 TABLET SUBLINGUAL
Status: DISCONTINUED | OUTPATIENT
Start: 2023-09-20 | End: 2023-09-21 | Stop reason: HOSPADM

## 2023-09-20 RX ORDER — ASPIRIN 81 MG/1
324 TABLET, CHEWABLE ORAL ONCE
Status: COMPLETED | OUTPATIENT
Start: 2023-09-20 | End: 2023-09-20

## 2023-09-20 RX ORDER — ACETAMINOPHEN 325 MG/1
650 TABLET ORAL EVERY 4 HOURS PRN
Status: DISCONTINUED | OUTPATIENT
Start: 2023-09-20 | End: 2023-09-21 | Stop reason: HOSPADM

## 2023-09-20 RX ORDER — SODIUM CHLORIDE 9 MG/ML
40 INJECTION, SOLUTION INTRAVENOUS AS NEEDED
Status: DISCONTINUED | OUTPATIENT
Start: 2023-09-20 | End: 2023-09-21 | Stop reason: HOSPADM

## 2023-09-20 RX ORDER — BISACODYL 5 MG/1
5 TABLET, DELAYED RELEASE ORAL DAILY PRN
Status: DISCONTINUED | OUTPATIENT
Start: 2023-09-20 | End: 2023-09-21 | Stop reason: HOSPADM

## 2023-09-20 RX ORDER — BISACODYL 10 MG
10 SUPPOSITORY, RECTAL RECTAL DAILY PRN
Status: DISCONTINUED | OUTPATIENT
Start: 2023-09-20 | End: 2023-09-21 | Stop reason: HOSPADM

## 2023-09-20 RX ORDER — CITALOPRAM HYDROBROMIDE 10 MG/1
10 TABLET ORAL DAILY
COMMUNITY

## 2023-09-20 RX ORDER — LORAZEPAM 0.5 MG/1
0.5 TABLET ORAL EVERY 8 HOURS PRN
Status: DISCONTINUED | OUTPATIENT
Start: 2023-09-20 | End: 2023-09-21 | Stop reason: HOSPADM

## 2023-09-20 RX ORDER — POLYETHYLENE GLYCOL 3350 17 G/17G
17 POWDER, FOR SOLUTION ORAL DAILY PRN
Status: DISCONTINUED | OUTPATIENT
Start: 2023-09-20 | End: 2023-09-21 | Stop reason: HOSPADM

## 2023-09-20 RX ORDER — ONDANSETRON 2 MG/ML
4 INJECTION INTRAMUSCULAR; INTRAVENOUS EVERY 6 HOURS PRN
Status: DISCONTINUED | OUTPATIENT
Start: 2023-09-20 | End: 2023-09-21 | Stop reason: HOSPADM

## 2023-09-20 RX ORDER — ENOXAPARIN SODIUM 150 MG/ML
1 INJECTION SUBCUTANEOUS ONCE
Status: COMPLETED | OUTPATIENT
Start: 2023-09-20 | End: 2023-09-20

## 2023-09-20 RX ORDER — NALOXONE HCL 0.4 MG/ML
0.4 VIAL (ML) INJECTION
Status: DISCONTINUED | OUTPATIENT
Start: 2023-09-20 | End: 2023-09-21 | Stop reason: HOSPADM

## 2023-09-20 RX ORDER — ACETAMINOPHEN 160 MG/5ML
650 SOLUTION ORAL EVERY 4 HOURS PRN
Status: DISCONTINUED | OUTPATIENT
Start: 2023-09-20 | End: 2023-09-21 | Stop reason: HOSPADM

## 2023-09-20 RX ORDER — PANTOPRAZOLE SODIUM 40 MG/1
40 TABLET, DELAYED RELEASE ORAL
Status: DISCONTINUED | OUTPATIENT
Start: 2023-09-21 | End: 2023-09-21 | Stop reason: HOSPADM

## 2023-09-20 RX ORDER — ONDANSETRON 4 MG/1
4 TABLET, FILM COATED ORAL EVERY 6 HOURS PRN
Status: DISCONTINUED | OUTPATIENT
Start: 2023-09-20 | End: 2023-09-21 | Stop reason: HOSPADM

## 2023-09-20 RX ORDER — AMOXICILLIN 250 MG
2 CAPSULE ORAL 2 TIMES DAILY
Status: DISCONTINUED | OUTPATIENT
Start: 2023-09-20 | End: 2023-09-21 | Stop reason: HOSPADM

## 2023-09-20 RX ORDER — ALBUTEROL SULFATE 2.5 MG/3ML
2.5 SOLUTION RESPIRATORY (INHALATION) EVERY 6 HOURS PRN
Status: DISCONTINUED | OUTPATIENT
Start: 2023-09-20 | End: 2023-09-21 | Stop reason: HOSPADM

## 2023-09-20 RX ORDER — IBUPROFEN 200 MG
200 TABLET ORAL EVERY 6 HOURS PRN
COMMUNITY
End: 2023-09-21 | Stop reason: HOSPADM

## 2023-09-20 RX ADMIN — MORPHINE SULFATE 1 MG: 2 INJECTION, SOLUTION INTRAMUSCULAR; INTRAVENOUS at 16:56

## 2023-09-20 RX ADMIN — Medication 10 ML: at 16:56

## 2023-09-20 RX ADMIN — SENNOSIDES AND DOCUSATE SODIUM 2 TABLET: 50; 8.6 TABLET ORAL at 21:57

## 2023-09-20 RX ADMIN — ASPIRIN 162 MG: 81 TABLET, CHEWABLE ORAL at 08:51

## 2023-09-20 RX ADMIN — ENOXAPARIN SODIUM 150 MG: 150 INJECTION SUBCUTANEOUS at 11:40

## 2023-09-20 RX ADMIN — MORPHINE SULFATE 1 MG: 2 INJECTION, SOLUTION INTRAMUSCULAR; INTRAVENOUS at 22:10

## 2023-09-20 RX ADMIN — ACETAMINOPHEN 650 MG: 325 TABLET, FILM COATED ORAL at 19:41

## 2023-09-20 RX ADMIN — Medication 10 ML: at 22:12

## 2023-09-20 RX ADMIN — MORPHINE SULFATE 4 MG: 4 INJECTION, SOLUTION INTRAMUSCULAR; INTRAVENOUS at 08:53

## 2023-09-20 RX ADMIN — IOPAMIDOL 100 ML: 755 INJECTION, SOLUTION INTRAVENOUS at 10:50

## 2023-09-20 RX ADMIN — HYDROMORPHONE HYDROCHLORIDE 1 MG: 1 INJECTION, SOLUTION INTRAMUSCULAR; INTRAVENOUS; SUBCUTANEOUS at 09:55

## 2023-09-20 RX ADMIN — ONDANSETRON 4 MG: 2 INJECTION INTRAMUSCULAR; INTRAVENOUS at 08:52

## 2023-09-20 RX ADMIN — APIXABAN 10 MG: 5 TABLET, FILM COATED ORAL at 21:57

## 2023-09-20 RX ADMIN — PERFLUTREN 6.52 MG: 6.52 INJECTION, SUSPENSION INTRAVENOUS at 16:03

## 2023-09-20 NOTE — Clinical Note
Level of Care: Telemetry [5]   Diagnosis: Pulmonary emboli [168256]   Admitting Physician: SAW GOODMAN [[id number]]   Attending Physician: SAW GOODMAN [[id number]]   Certification: I Certify That Inpatient Hospital Services Are Medically Necessary For Greater Than 2 Midnights

## 2023-09-20 NOTE — H&P
[facility] Medicine Services  HISTORY AND PHYSICAL    Date of Admission: 9/20/2023  Primary Care Physician: Douglas Carnes MD    Subjective   Primary Historian: Patient     Chief Complaint: SOB     History of Present Illness  Patient is a 37-year-old female with history of anxiety, morbid obesity and current who presents to the ER due to worsening shortness of breath.  Patient reports symptom onset was 3 days ago.  ER work-up was positive for an acute pulmonary embolus in the right upper, middle and lower lobe, with no evidence of a right heart strain.  Patient was started on treatment dose anticoagulation Lovenox.  Patient denies recent travel, tobacco use, illicit drug use, birth control use, or family history of blood clots.  Patient reports some occasional pains in the feet and shin.         Review of Systems   Respiratory:  Negative for shortness of breath.     Otherwise complete ROS reviewed and negative except as mentioned in the HPI.    Past Medical History:   Past Medical History:   Diagnosis Date    Abdominal hernia     Anemia     Anxiety     Diverticulitis     Fibroid     GERD (gastroesophageal reflux disease)     History of swelling of feet     PMS (premenstrual syndrome)      Past Surgical History:  Past Surgical History:   Procedure Laterality Date    ADENOIDECTOMY      APPENDECTOMY      CHOLECYSTECTOMY      open    HERNIA REPAIR      x3 living tissue    LAPAROSCOPIC CHOLECYSTECTOMY      LAPAROSCOPIC COLON RESECTION      LYMPH NODE DISSECTION      TONSILLECTOMY       Social History:  reports that she has never smoked. She has never used smokeless tobacco. She reports current alcohol use of about 2.0 standard drinks per week. She reports that she does not use drugs.    Family History: family history includes Aneurysm in her paternal grandmother; Breast cancer in her paternal aunt; COPD in her father; Cancer in her maternal grandfather; Colon cancer in her  paternal aunt and paternal aunt; Coronary artery disease in her father; Crohn's disease in her brother; Emphysema in her father; Heart attack in her paternal grandfather; Heart disease in her paternal grandfather; Hypereosinophilic syndrome in her brother; Hyperlipidemia in her father; Hypertension in her father and maternal grandmother; Hypothyroidism in her father and mother; Lupus in her mother; Osteoporosis in her mother; Rheum arthritis in her brother and mother; Stroke in her maternal grandmother.       Allergies:  No Known Allergies    Medications:  Prior to Admission medications    Medication Sig Start Date End Date Taking? Authorizing Provider   busPIRone (BUSPAR) 15 MG tablet Take 1 tablet by mouth 3 (Three) Times a Day.   Yes Na Arguelles MD   citalopram (CeleXA) 20 MG tablet Take 1 tablet by mouth Daily.   Yes Na Arguelles MD   ibuprofen (ADVIL,MOTRIN) 200 MG tablet Take  by mouth Every 6 (Six) Hours.   Yes Na Arguelles MD   omeprazole (priLOSEC) 20 MG capsule Take 1 capsule by mouth Daily As Needed. 7/9/21  Yes Na Arguelles MD   albuterol sulfate  (90 Base) MCG/ACT inhaler 2 puffs Every 6 (Six) Hours As Needed. 10/24/22   Na Arguelles MD   dicyclomine (BENTYL) 20 MG tablet Take 1 tablet by mouth Every 6 (Six) Hours.  Patient taking differently: Take 1 tablet by mouth Every 6 (Six) Hours As Needed. 9/28/20   Fiorella Sandoval MD   furosemide (LASIX) 20 MG tablet Take  by mouth Daily.    Na Arguelles MD   meloxicam (MOBIC) 15 MG tablet Take  by mouth. 3/16/17   Na Arguelles MD   meloxicam (MOBIC) 15 MG tablet Take 1 tablet by mouth Daily. 11/16/22   Na Arguelles MD   nystatin (MYCOSTATIN) 527776 UNIT/GM cream Apply 1 application topically to the appropriate area as directed 2 (Two) Times a Day. 3/6/23   Lisbet Vargas APRN   nystatin-triamcinolone (MYCOLOG) 254996-5.1 UNIT/GM-% ointment Apply  topically to the  "appropriate area as directed 2 (Two) Times a Day. 9/14/21   Lisbet Vargas APRN   ondansetron ODT (ZOFRAN-ODT) 4 MG disintegrating tablet Place 1 tablet on the tongue Every 8 (Eight) Hours As Needed for Nausea or Vomiting. 9/28/20   Fiorella Sandoval MD   potassium chloride (MICRO-K) 10 MEQ CR capsule Take  by mouth Daily.    Na Arguelles MD   Saxenda 18 MG/3ML injection pen START 0.6 MG SUBCUTANEOUSLY DAILY X1 WEEK THEN INCREASE BY 0.6 MG WEEKLY UNTIL TARGET DOSE OF 3 MG 12/23/22   ProviderNa MD   therapeutic multivitamin-minerals (THERAGRAN-M) tablet Take  by mouth.    ProviderNa MD     I have utilized all available immediate resources to obtain, update, or review the patient's current medications (including all prescriptions, over-the-counter products, herbals, cannabis/cannabidiol products, and vitamin/mineral/dietary (nutritional) supplements).    Objective     Vital Signs: /55   Pulse 81   Temp 98.3 °F (36.8 °C) (Oral)   Resp 16   Ht 152.4 cm (60\")   Wt (!) 145 kg (320 lb)   LMP 09/06/2023   SpO2 96%   BMI 62.50 kg/m²   Physical Exam  Vitals and nursing note reviewed.   Constitutional:       General: She is not in acute distress.     Appearance: She is morbidly obese. She is not diaphoretic.   HENT:      Head: Normocephalic and atraumatic.      Right Ear: External ear normal.      Left Ear: External ear normal.   Eyes:      General: No scleral icterus.     Extraocular Movements: Extraocular movements intact.      Conjunctiva/sclera: Conjunctivae normal.   Cardiovascular:      Rate and Rhythm: Normal rate and regular rhythm.      Heart sounds: Normal heart sounds.   Pulmonary:      Effort: Pulmonary effort is normal. No respiratory distress.      Breath sounds: Normal breath sounds.   Chest:      Chest wall: No tenderness.   Abdominal:      General: Bowel sounds are normal.      Palpations: Abdomen is soft.      Tenderness: There is no abdominal tenderness.    "   Hernia: A hernia is present.   Musculoskeletal:         General: No swelling or tenderness.      Cervical back: Normal range of motion and neck supple.      Right lower leg: No edema.      Left lower leg: No edema.   Skin:     General: Skin is warm and dry.      Capillary Refill: Capillary refill takes less than 2 seconds.      Findings: No erythema or rash.   Neurological:      General: No focal deficit present.      Mental Status: She is alert and oriented to person, place, and time.      Motor: No weakness.   Psychiatric:         Behavior: Behavior normal.            Results Reviewed:  Lab Results (last 24 hours)       Procedure Component Value Units Date/Time    Lipid Panel [[id number]] Collected: 09/20/23 1034    Specimen: Blood Updated: 09/20/23 1504    Factor 5 Leiden [[id number]] Collected: 09/20/23 0849    Specimen: Blood Updated: 09/20/23 1439    High Sensitivity Troponin T 2Hr [[id number]] Collected: 09/20/23 1034    Specimen: Blood Updated: 09/20/23 1112     HS Troponin T <6 ng/L      Troponin T Delta --     Comment: Unable to calculate.       Narrative:      High Sensitive Troponin T Reference Range:  <10.0 ng/L- Negative Female for AMI  <15.0 ng/L- Negative Male for AMI  >=10 - Abnormal Female indicating possible myocardial injury.  >=15 - Abnormal Male indicating possible myocardial injury.   Clinicians would have to utilize clinical acumen, EKG, Troponin, and serial changes to determine if it is an Acute Myocardial Infarction or myocardial injury due to an underlying chronic condition.         [place] Draw [[id number]] Collected: 09/20/23 0849    Specimen: Blood Updated: 09/20/23 1000    Narrative:      The following orders were created for panel order [place] Draw.  Procedure                               Abnormality         Status                     ---------                               -----------         ------                     Green Top (Gel)[[id number]]                                  Final  result               Lavender Top[[id number]]                                     Final result               Red Top[[id number]]                                          Final result               Light Blue Top[[id number]]                                   Final result                 Please view results for these tests on the individual orders.    Lavender Top [[id number]] Collected: 09/20/23 0849    Specimen: Blood Updated: 09/20/23 1000     Extra Tube hold for add-on     Comment: Auto resulted       Red Top [[id number]] Collected: 09/20/23 0849    Specimen: Blood Updated: 09/20/23 1000     Extra Tube Hold for add-ons.     Comment: Auto resulted.       Light Blue Top [[id number]] Collected: 09/20/23 0849    Specimen: Blood Updated: 09/20/23 1000     Extra Tube Hold for add-ons.     Comment: Auto resulted       Green Top (Gel) [[id number]] Collected: 09/20/23 0849    Specimen: Blood Updated: 09/20/23 1000     Extra Tube Hold for add-ons.     Comment: Auto resulted.       Comprehensive Metabolic Panel [[id number]]  (Abnormal) Collected: 09/20/23 0849    Specimen: Blood Updated: 09/20/23 0936     Glucose 106 mg/dL      BUN 13 mg/dL      Creatinine 0.61 mg/dL      Sodium 134 mmol/L      Potassium 4.0 mmol/L      Chloride 102 mmol/L      CO2 21.0 mmol/L      Calcium 8.5 mg/dL      Total Protein 7.1 g/dL      Albumin 4.0 g/dL      ALT (SGPT) 60 U/L      AST (SGOT) 51 U/L      Alkaline Phosphatase 108 U/L      Total Bilirubin 0.4 mg/dL      Globulin 3.1 gm/dL      A/G Ratio 1.3 g/dL      BUN/Creatinine Ratio 21.3     Anion Gap 11.0 mmol/L      eGFR 117.5 mL/min/1.73     Narrative:      GFR Normal >60  Chronic Kidney Disease <60  Kidney Failure <15      High Sensitivity Troponin T [[id number]]  (Normal) Collected: 09/20/23 0849    Specimen: Blood Updated: 09/20/23 0931     HS Troponin T <6 ng/L     Narrative:      High Sensitive Troponin T Reference Range:  <10.0 ng/L- Negative Female for AMI  <15.0 ng/L- Negative Male for  "AMI  >=10 - Abnormal Female indicating possible myocardial injury.  >=15 - Abnormal Male indicating possible myocardial injury.   Clinicians would have to utilize clinical acumen, EKG, Troponin, and serial changes to determine if it is an Acute Myocardial Infarction or myocardial injury due to an underlying chronic condition.         Lipase [[id number]]  (Normal) Collected: 09/20/23 0849    Specimen: Blood Updated: 09/20/23 0931     Lipase 15 U/L     D-dimer, Quantitative [[id number]]  (Abnormal) Collected: 09/20/23 0849    Specimen: Blood Updated: 09/20/23 0923     D-Dimer, Quantitative 1.73 MCGFEU/mL     Narrative:      According to the assay 's published package insert, a normal (<0.50 MCGFEU/mL) D-dimer result in conjunction with a non-high clinical probability assessment, excludes deep vein thrombosis (DVT) and pulmonary embolism (PE) with high sensitivity.    D-dimer values increase with age and this can make VTE exclusion of an older population difficult. To address this, the [employer], based on best available evidence and recent guidelines, recommends that clinicians use age-adjusted D-dimer thresholds in patients greater than 50 years of age with: a) a low probability of PE who do not meet all Pulmonary Embolism Rule Out Criteria, or b) in those with intermediate probability of PE.   The formula for an age-adjusted D-dimer cut-off is \"age/100\".  For example, a 60 year old patient would have an age-adjusted cut-off of 0.60 MCGFEU/mL and an 80 year old 0.80 MCGFEU/mL.    Urinalysis With Culture If Indicated - Urine, Clean Catch [[id number]]  (Normal) Collected: 09/20/23 0901    Specimen: Urine, Clean Catch Updated: 09/20/23 0913     Color, UA Yellow     Appearance, UA Clear     pH, UA 6.0     Specific Gravity, UA 1.019     Glucose, UA Negative     Ketones, UA Negative     Bilirubin, UA Negative     Blood, UA Negative     Protein, UA Negative     Leuk Esterase, UA Negative     " Nitrite, UA Negative     Urobilinogen, UA 0.2 E.U./dL    Narrative:      In absence of clinical symptoms, the presence of pyuria, bacteria, and/or nitrites on the urinalysis result does not correlate with infection.  Urine microscopic not indicated.    CBC & Differential [[id number]]  (Abnormal) Collected: 09/20/23 0849    Specimen: Blood Updated: 09/20/23 0913    Narrative:      The following orders were created for panel order CBC & Differential.  Procedure                               Abnormality         Status                     ---------                               -----------         ------                     CBC Auto Differential[[id number]]        Abnormal            Final result                 Please view results for these tests on the individual orders.    CBC Auto Differential [[id number]]  (Abnormal) Collected: 09/20/23 0849    Specimen: Blood Updated: 09/20/23 0913     WBC 6.87 10*3/mm3      RBC 4.53 10*6/mm3      Hemoglobin 11.5 g/dL      Hematocrit 37.6 %      MCV 83.0 fL      MCH 25.4 pg      MCHC 30.6 g/dL      RDW 14.4 %      RDW-SD 43.2 fl      MPV 9.4 fL      Platelets 241 10*3/mm3      Neutrophil % 70.1 %      Lymphocyte % 21.4 %      Monocyte % 5.2 %      Eosinophil % 2.3 %      Basophil % 0.6 %      Immature Grans % 0.4 %      Neutrophils, Absolute 4.81 10*3/mm3      Lymphocytes, Absolute 1.47 10*3/mm3      Monocytes, Absolute 0.36 10*3/mm3      Eosinophils, Absolute 0.16 10*3/mm3      Basophils, Absolute 0.04 10*3/mm3      Immature Grans, Absolute 0.03 10*3/mm3      nRBC 0.0 /100 WBC     POC Urine Pregnancy [[id number]]  (Normal) Collected: 09/20/23 0910    Specimen: Urine Updated: 09/20/23 0911     HCG, Urine, QL Negative     Lot Number [id number]     Internal Positive Control Positive     Internal Negative Control Negative     Expiration Date 1,102,025          Imaging Results (Last 24 Hours)       Procedure Component Value Units Date/Time    US Venous Doppler Lower Extremity Bilateral  (duplex) [[id number]] Resulted: 09/20/23 1503     Updated: 09/20/23 1503    CT Abdomen Pelvis With Contrast [[id number]] Collected: 09/20/23 1111     Updated: 09/20/23 1122    Narrative:      EXAM: CT ABDOMEN PELVIS W CONTRAST-     INDICATION: Abdominal pain, acute, nonlocalized        TECHNIQUE: Helically acquired CT images were obtained of the abdomen and  pelvis after the administration of intravenous contrast. Coronal and  sagittal reformations were performed.        CONTRAST:  Isovue-300      DOSE LENGTH PRODUCT: 1722 mGy cm. Automated exposure control was also  utilized to decrease patient radiation dose.     COMPARISON: 3/1/2023     FINDINGS:     Lower Chest: Unremarkable.     Liver: Hepatic steatosis.     Biliary Tree: Status post cholecystectomy.     Spleen: Unremarkable.     Pancreas: Unremarkable.     Adrenal Glands: Unremarkable.     Kidneys/Ureters/Bladder: Subcentimeter low-density lesions in the  bilateral kidneys are too small to characterize but likely unchanged,  most likely representing a small cyst.     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 This has progressively  increased in size dating back to 3/15/2022. This currently measures up  to 12.7 x 5.2 cm, 2.8 x 1.8 cm on 3/15/2022.     Gastrointestinal Tract: Suture line in the sigmoid colon. Colonic  diverticulosis. See below findings.     Lymphatics: Unremarkable.     Vasculature: Unremarkable.      Peritoneum/Retroperitoneum: Unremarkable.     Abdominal Wall/Soft Tissues: Rectus diastases with a fat and small bowel  containing hernia without evidence of obstruction.     Osseous Structures: Unremarkable.       Impression:            No acute findings in the abdomen/pelvis.     Fat attenuating lesion within the myometrium has progressively increased  in size dating back to 3/15/2022. This is favored to represent a  lipoleiomyoma, however given significant increase in size  recommend  OB/GYN consultation.     Hepatic steatosis.     This report was signed and finalized on 9/20/2023 11:19 AM CDT by Renzo Myers.       CT Angiogram Chest [[id number]] Collected: 09/20/23 1057     Updated: 09/20/23 1105    Narrative:      CT ANGIOGRAM CHEST- 9/20/2023 10:41 AM CDT      HISTORY: Pulmonary embolism (PE) suspected, unknown D-dimer      COMPARISON: CT scan dated 9/29/2021     DOSE LENGTH PRODUCT: 245 mGy cm. Automated exposure control was also  utilized to decrease patient radiation dose.     TECHNIQUE: Helical tomographic images of the chest were obtained after  the administration of intravenous contrast following angiogram protocol.  Additionally, 3D and multiplanar reformatted images were provided.       FINDINGS:    Pulmonary arteries: There is adequate enhancement of the pulmonary  arteries to evaluate for central and segmental pulmonary emboli. Acute  pulmonary embolus identified in the right lower lobe pulmonary artery  extending down into the segmental pulmonary arteries. There is also  embolus in the right middle lobe pulmonary artery which extends into the  medial segment pulmonary artery. There is segmental and subsegmental  embolus identified in the right upper lobe pulmonary arteries. No  pulmonary embolus identified on the left. Central pulmonary arteries are  nondilated.     AORTA AND GREAT VESSELS: Thoracic aorta is normal in caliber. No  dissection identified. No flow-limiting stenosis identified at the great  vessel origins.     VISUALIZED NECK BASE: The imaged portion of the base of the neck appears  unremarkable.     LUNGS: The lungs are clear. There is no mass, worrisome nodule, or  consolidation. No pleural effusion is seen. Airways are clear.     HEART: The heart is normal in size. There is no pericardial effusion.     MEDIASTINUM AND LYMPH NODEs: No suspicious hilar or mediastinal  adenopathy..     SKELETAL AND SOFT TISSUES: Chest wall soft tissues are unremarkable.  No  acute bony abnormality. Thoracic spine degenerative change.     UPPER ABDOMEN: The imaged portion of the upper abdomen demonstrates no  acute process.       Impression:      1.  Acute pulmonary embolus identified in the right upper, right middle  and right lower lobes. No pulmonary embolus identified on the left. The  central pulmonary arteries are nondilated. No CT evidence for right  heart strain.  2.  Lungs are clear and well expanded.     Findings and recommendations were discussed with PIA HORNE on  9/20/2023 11:01 AM CDT at 9/20/2023 11:01 AM CDT.     This report was signed and finalized on 9/20/2023 11:02 AM CDT by Dr Mati Womack.       XR Chest 1 View [[id number]] Collected: 09/20/23 0938     Updated: 09/20/23 0942    Narrative:      XR CHEST 1 VW-     HISTORY: Chest Pain Protocol     COMPARISON: 9/29/2021     FINDINGS: Frontal view of the chest obtained.     Slightly diminished level of inspiration. Lungs are free of  consolidation, collapse, or edema. The heart appears borderline  enlarged. No radiographic evidence of edema. No pleural effusion or  pneumothorax. No acute regional bony pathology.          Impression:      Borderline cardiomegaly on this portable AP study. Diminished level of  inspiration with no acute pulmonary process.     This report was signed and finalized on 9/20/2023 9:39 AM CDT by Dr. Alisa Morales MD.             I have personally reviewed and interpreted the radiology studies and ECG obtained at time of admission.     Assessment / Plan   Assessment:   Active Hospital Problems    Diagnosis     **Pulmonary emboli     Pulmonary embolism     Class 3 severe obesity due to excess calories with serious comorbidity and body mass index (BMI) of 60.0 to 69.9 in adult     Obstructive sleep apnea        Treatment Plan  The patient will be admitted to my service here at [facility].  Acute pulmonary embolism: Start patient on full anticoagulation.  - Obtain  hypercoagulability study  - Echocardiogram  - Bilateral lower extremity Doppler  - Cardiac monitoring  - Vital signs per protocol    We will had hemoglobin A1c, lipid panel to labs    CPAP to sleep overnight    Morbid obesity: Body mass index is 62.5 kg/m².  PT OT when patient      Medical Decision Making  Number and Complexity of problems: 1 acute medical problem with high complexity.  Differential Diagnosis: As above    Conditions and Status        Condition is unchanged.     MDM Data  External documents reviewed: Epic records  Cardiac tracing (EKG, telemetry) interpretation: EKG reviewed  Radiology interpretation: Imaging reviewed  Labs reviewed: Yes  Any tests that were considered but not ordered: Will order hypercoagulability test.      Decision rules/scores evaluated (example [id number], Wells, etc): N/A     Discussed with: Patient     Care Planning  Shared decision making: Patient apprised of current labs, vitals, imaging and treatment plan.  They are agreeable with proceeding with plans as discussed.    Code status and discussions:   Code Status and Medical Interventions:   Ordered at: 09/20/23 1430     Level Of Support Discussed With:    Patient     Code Status (Patient has no pulse and is not breathing):    CPR (Attempt to Resuscitate)     Medical Interventions (Patient has pulse or is breathing):    Full Support         Disposition  Social Determinants of Health that impact treatment or disposition: none  Estimated length of stay is 1-2 days.     I confirmed that the patient's advanced care plan is present, code status is documented, and a surrogate decision maker is listed in the patient's medical record.     The patient's surrogate decision maker is patient's brother Rao Banerjee, updated ACP documentation.  Patient will provide once her cell phone gets charged.    The patient was seen and examined by me on 9/20/2023 at 1:50 PM.    Electronically signed by Jaqui East MD, 09/20/23, 15:17  CDT.

## 2023-09-20 NOTE — ED PROVIDER NOTES
Subjective   History of Present Illness  Patient is a 37-year-old female with a history of GERD who presents to the ER with chest pain and shortness of breath.  Patient states she was walking yesterday around 5 to 6 PM when she developed midsternal chest pain.  She describes it as a tightness that radiates to her left shoulder.  Pain has been constant since onset but varies in intensity and is worse with ambulation.  She does have associated shortness of air.  Patient also complains of left upper quadrant abdominal pain that she has had intermittently for the last month.  Patient states the pain is worse with eating.  She denies any fever, nausea vomiting diarrhea, urinary changes, neurologic changes.  She denies a history of MI or pulmonary embolus.    Review of Systems   Constitutional: Negative.    HENT: Negative.     Eyes: Negative.    Respiratory:  Positive for shortness of breath.    Cardiovascular:  Positive for chest pain.   Gastrointestinal:  Positive for abdominal pain.   Endocrine: Negative.    Genitourinary: Negative.    Musculoskeletal: Negative.    Skin: Negative.    Allergic/Immunologic: Negative.    Neurological: Negative.    Hematological: Negative.    Psychiatric/Behavioral: Negative.     All other systems reviewed and are negative.    Past Medical History:   Diagnosis Date    Abdominal hernia     Anemia     Anxiety     Diverticulitis     Fibroid     GERD (gastroesophageal reflux disease)     History of swelling of feet     PMS (premenstrual syndrome)        No Known Allergies    Past Surgical History:   Procedure Laterality Date    ADENOIDECTOMY      APPENDECTOMY      CHOLECYSTECTOMY      open    HERNIA REPAIR      x3 living tissue    LAPAROSCOPIC CHOLECYSTECTOMY      LAPAROSCOPIC COLON RESECTION      LYMPH NODE DISSECTION      TONSILLECTOMY         Family History   Problem Relation Age of Onset    Lupus Mother     Osteoporosis Mother     Rheum arthritis Mother     Hypothyroidism Mother     COPD  Father     Coronary artery disease Father     Emphysema Father     Hypertension Father     Hyperlipidemia Father     Hypothyroidism Father     Rheum arthritis Brother     Hypereosinophilic syndrome Brother     Crohn's disease Brother     Breast cancer Paternal Aunt     Colon cancer Paternal Aunt     Colon cancer Paternal Aunt     Hypertension Maternal Grandmother     Stroke Maternal Grandmother     Cancer Maternal Grandfather     Aneurysm Paternal Grandmother     Heart disease Paternal Grandfather     Heart attack Paternal Grandfather     Ovarian cancer Neg Hx     Uterine cancer Neg Hx        Social History     Socioeconomic History    Marital status: Single   Tobacco Use    Smoking status: Never    Smokeless tobacco: Never   Substance and Sexual Activity    Alcohol use: Yes     Alcohol/week: 2.0 standard drinks     Types: 1 Glasses of wine, 1 Cans of beer per week     Comment: rarely    Drug use: No    Sexual activity: Yes     Partners: Male     Birth control/protection: OCP           Objective   Physical Exam  Vitals and nursing note reviewed.   Constitutional:       Appearance: She is well-developed.   HENT:      Head: Normocephalic and atraumatic.   Eyes:      Conjunctiva/sclera: Conjunctivae normal.      Pupils: Pupils are equal, round, and reactive to light.   Cardiovascular:      Rate and Rhythm: Normal rate and regular rhythm.      Heart sounds: Normal heart sounds.   Pulmonary:      Effort: Pulmonary effort is normal.      Breath sounds: Normal breath sounds.   Abdominal:      Palpations: Abdomen is soft.      Tenderness: There is no abdominal tenderness. There is no right CVA tenderness, left CVA tenderness, guarding or rebound.   Musculoskeletal:         General: No deformity. Normal range of motion.      Cervical back: Normal range of motion.   Skin:     General: Skin is warm.   Neurological:      Mental Status: She is alert and oriented to person, place, and time.   Psychiatric:         Behavior:  Behavior normal.       Procedures           ED Course      EKG as interpreted by me: Normal sinus rhythm with a rate of 76, no acute ischemia or infarction         Lab Results (last 24 hours)       Procedure Component Value Units Date/Time    CBC & Differential [[id number]]  (Abnormal) Collected: 09/20/23 0849    Specimen: Blood Updated: 09/20/23 0913    Narrative:      The following orders were created for panel order CBC & Differential.  Procedure                               Abnormality         Status                     ---------                               -----------         ------                     CBC Auto Differential[[id number]]        Abnormal            Final result                 Please view results for these tests on the individual orders.    Comprehensive Metabolic Panel [[id number]]  (Abnormal) Collected: 09/20/23 0849    Specimen: Blood Updated: 09/20/23 0936     Glucose 106 mg/dL      BUN 13 mg/dL      Creatinine 0.61 mg/dL      Sodium 134 mmol/L      Potassium 4.0 mmol/L      Chloride 102 mmol/L      CO2 21.0 mmol/L      Calcium 8.5 mg/dL      Total Protein 7.1 g/dL      Albumin 4.0 g/dL      ALT (SGPT) 60 U/L      AST (SGOT) 51 U/L      Alkaline Phosphatase 108 U/L      Total Bilirubin 0.4 mg/dL      Globulin 3.1 gm/dL      A/G Ratio 1.3 g/dL      BUN/Creatinine Ratio 21.3     Anion Gap 11.0 mmol/L      eGFR 117.5 mL/min/1.73     Narrative:      GFR Normal >60  Chronic Kidney Disease <60  Kidney Failure <15      High Sensitivity Troponin T [[id number]]  (Normal) Collected: 09/20/23 0849    Specimen: Blood Updated: 09/20/23 0931     HS Troponin T <6 ng/L     Narrative:      High Sensitive Troponin T Reference Range:  <10.0 ng/L- Negative Female for AMI  <15.0 ng/L- Negative Male for AMI  >=10 - Abnormal Female indicating possible myocardial injury.  >=15 - Abnormal Male indicating possible myocardial injury.   Clinicians would have to utilize clinical acumen, EKG, Troponin, and serial changes to  "determine if it is an Acute Myocardial Infarction or myocardial injury due to an underlying chronic condition.         D-dimer, Quantitative [[id number]]  (Abnormal) Collected: 09/20/23 0849    Specimen: Blood Updated: 09/20/23 0923     D-Dimer, Quantitative 1.73 MCGFEU/mL     Narrative:      According to the assay 's published package insert, a normal (<0.50 MCGFEU/mL) D-dimer result in conjunction with a non-high clinical probability assessment, excludes deep vein thrombosis (DVT) and pulmonary embolism (PE) with high sensitivity.    D-dimer values increase with age and this can make VTE exclusion of an older population difficult. To address this, the [employer], based on best available evidence and recent guidelines, recommends that clinicians use age-adjusted D-dimer thresholds in patients greater than 50 years of age with: a) a low probability of PE who do not meet all Pulmonary Embolism Rule Out Criteria, or b) in those with intermediate probability of PE.   The formula for an age-adjusted D-dimer cut-off is \"age/100\".  For example, a 60 year old patient would have an age-adjusted cut-off of 0.60 MCGFEU/mL and an 80 year old 0.80 MCGFEU/mL.    Lipase [[id number]]  (Normal) Collected: 09/20/23 0849    Specimen: Blood Updated: 09/20/23 0931     Lipase 15 U/L     CBC Auto Differential [[id number]]  (Abnormal) Collected: 09/20/23 0849    Specimen: Blood Updated: 09/20/23 0913     WBC 6.87 10*3/mm3      RBC 4.53 10*6/mm3      Hemoglobin 11.5 g/dL      Hematocrit 37.6 %      MCV 83.0 fL      MCH 25.4 pg      MCHC 30.6 g/dL      RDW 14.4 %      RDW-SD 43.2 fl      MPV 9.4 fL      Platelets 241 10*3/mm3      Neutrophil % 70.1 %      Lymphocyte % 21.4 %      Monocyte % 5.2 %      Eosinophil % 2.3 %      Basophil % 0.6 %      Immature Grans % 0.4 %      Neutrophils, Absolute 4.81 10*3/mm3      Lymphocytes, Absolute 1.47 10*3/mm3      Monocytes, Absolute 0.36 10*3/mm3      Eosinophils, " Absolute 0.16 10*3/mm3      Basophils, Absolute 0.04 10*3/mm3      Immature Grans, Absolute 0.03 10*3/mm3      nRBC 0.0 /100 WBC     Urinalysis With Culture If Indicated - Urine, Clean Catch [[id number]]  (Normal) Collected: 09/20/23 0901    Specimen: Urine, Clean Catch Updated: 09/20/23 0913     Color, UA Yellow     Appearance, UA Clear     pH, UA 6.0     Specific Gravity, UA 1.019     Glucose, UA Negative     Ketones, UA Negative     Bilirubin, UA Negative     Blood, UA Negative     Protein, UA Negative     Leuk Esterase, UA Negative     Nitrite, UA Negative     Urobilinogen, UA 0.2 E.U./dL    Narrative:      In absence of clinical symptoms, the presence of pyuria, bacteria, and/or nitrites on the urinalysis result does not correlate with infection.  Urine microscopic not indicated.    POC Urine Pregnancy [[id number]]  (Normal) Collected: 09/20/23 0910    Specimen: Urine Updated: 09/20/23 0911     HCG, Urine, QL Negative     Lot Number [id number]     Internal Positive Control Positive     Internal Negative Control Negative     Expiration Date 1,102,025    High Sensitivity Troponin T 2Hr [[id number]] Collected: 09/20/23 1034    Specimen: Blood Updated: 09/20/23 1112     HS Troponin T <6 ng/L      Troponin T Delta --     Comment: Unable to calculate.       Narrative:      High Sensitive Troponin T Reference Range:  <10.0 ng/L- Negative Female for AMI  <15.0 ng/L- Negative Male for AMI  >=10 - Abnormal Female indicating possible myocardial injury.  >=15 - Abnormal Male indicating possible myocardial injury.   Clinicians would have to utilize clinical acumen, EKG, Troponin, and serial changes to determine if it is an Acute Myocardial Infarction or myocardial injury due to an underlying chronic condition.                CT Angiogram Chest   Final Result   1.  Acute pulmonary embolus identified in the right upper, right middle   and right lower lobes. No pulmonary embolus identified on the left. The   central pulmonary  arteries are nondilated. No CT evidence for right   heart strain.   2.  Lungs are clear and well expanded.       Findings and recommendations were discussed with PIA HORNE on   9/20/2023 11:01 AM CDT at 9/20/2023 11:01 AM CDT.       This report was signed and finalized on 9/20/2023 11:02 AM CDT by Dr Mati Womack.          CT Abdomen Pelvis With Contrast   Final Result          No acute findings in the abdomen/pelvis.       Fat attenuating lesion within the myometrium has progressively increased   in size dating back to 3/15/2022. This is favored to represent a   lipoleiomyoma, however given significant increase in size recommend   OB/GYN consultation.       Hepatic steatosis.       This report was signed and finalized on 9/20/2023 11:19 AM CDT by Renzo Myers.          XR Chest 1 View   Final Result   Borderline cardiomegaly on this portable AP study. Diminished level of   inspiration with no acute pulmonary process.       This report was signed and finalized on 9/20/2023 9:39 AM CDT by Dr. Alisa Morales MD.                                           Medical Decision Making  Patient is a 37-year-old female with a history of GERD who presents to the ER with chest pain and shortness of breath.  Patient states she was walking yesterday around 5 to 6 PM when she developed midsternal chest pain.  She describes it as a tightness that radiates to her left shoulder.  Pain has been constant since onset but varies in intensity and is worse with ambulation.  She does have associated shortness of air.  Patient also complains of left upper quadrant abdominal pain that she has had intermittently for the last month.  Patient states the pain is worse with eating.  She denies any fever, nausea vomiting diarrhea, urinary changes, neurologic changes.  She denies a history of MI or pulmonary embolus.    Differential diagnosis: Acute coronary syndrome, musculoskeletal chest pain, pulmonary embolus    Patient was given  aspirin, morphine and Zofran.  Labs showed a mildly elevated ALT and AST and an elevated D-dimer.  Chest x-ray showed borderline cardiomegaly with a diminished level of inspiration but no acute process.  CTA of the chest showed acute pulmonary embolus identified in the right upper, right middle and right lower lobes.  There is no evidence of right heart strain.  Patient was given Lovenox.  CT scan of the abdomen pelvis showed no acute findings.  Patient did have some uterine fibroids and fatty liver.  Dr. Goodman with the hospitalist group was consulted and has admitted the patient to his service for further work-up and treatment.    Problems Addressed:  Multiple subsegmental pulmonary emboli without acute cor pulmonale: complicated acute illness or injury    Amount and/or Complexity of Data Reviewed  Labs: ordered. Decision-making details documented in ED Course.  Radiology: ordered. Decision-making details documented in ED Course.  ECG/medicine tests: ordered. Decision-making details documented in ED Course.  Discussion of management or test interpretation with external provider(s): Dr Goodman with the hospitalist group    Risk  OTC drugs.  Prescription drug management.  Decision regarding hospitalization.        Final diagnoses:   Multiple subsegmental pulmonary emboli without acute cor pulmonale       ED Disposition  ED Disposition       ED Disposition   Decision to Admit    Condition   --    Comment   Level of Care: Telemetry [5]   Diagnosis: Pulmonary emboli [232846]   Admitting Physician: SAW GOODMAN [[id number]]   Attending Physician: SAW GOODMAN [[id number]]   Certification: I Certify That Inpatient Hospital Services Are Medically Necessary For Greater Than 2 Midnights                 No follow-up provider specified.       Medication List      No changes were made to your prescriptions during this visit.            Fiorella Sandoval MD  09/20/23 2710

## 2023-09-20 NOTE — PLAN OF CARE
Problem: Adult Inpatient Plan of Care  Goal: Plan of Care Review  Outcome: Ongoing, Progressing  Flowsheets (Taken 9/20/2023 1742)  Outcome Evaluation: VSS. pt c/o headache/chest discomfort. gave morphine PRN with some relief. family at bedside. on RA. no SOA noted. lungs- diminished. continue to monitor.  Goal: Patient-Specific Goal (Individualized)  Outcome: Ongoing, Progressing  Goal: Absence of Hospital-Acquired Illness or Injury  Outcome: Ongoing, Progressing  Intervention: Identify and Manage Fall Risk  Recent Flowsheet Documentation  Taken 9/20/2023 1656 by Carolee Szymanski RN  Safety Promotion/Fall Prevention: safety round/check completed  Goal: Optimal Comfort and Wellbeing  Outcome: Ongoing, Progressing  Intervention: Monitor Pain and Promote Comfort  Recent Flowsheet Documentation  Taken 9/20/2023 1656 by Carolee Szymanski RN  Pain Management Interventions: see MAR  Goal: Readiness for Transition of Care  Outcome: Ongoing, Progressing   Goal Outcome Evaluation:              Outcome Evaluation: VSS. pt c/o headache/chest discomfort. gave morphine PRN with some relief. family at bedside. on RA. no SOA noted. lungs- diminished. continue to monitor.

## 2023-09-21 VITALS
TEMPERATURE: 98.2 F | BODY MASS INDEX: 57.52 KG/M2 | WEIGHT: 293 LBS | HEIGHT: 60 IN | SYSTOLIC BLOOD PRESSURE: 126 MMHG | HEART RATE: 72 BPM | OXYGEN SATURATION: 97 % | RESPIRATION RATE: 18 BRPM | DIASTOLIC BLOOD PRESSURE: 53 MMHG

## 2023-09-21 PROBLEM — I10 ESSENTIAL HYPERTENSION: Status: ACTIVE | Noted: 2023-09-21

## 2023-09-21 PROBLEM — R73.03 PREDIABETES: Status: ACTIVE | Noted: 2023-09-21

## 2023-09-21 LAB
ANION GAP SERPL CALCULATED.3IONS-SCNC: 10 MMOL/L (ref 5–15)
BASOPHILS # BLD AUTO: 0.05 10*3/MM3 (ref 0–0.2)
BASOPHILS NFR BLD AUTO: 0.8 % (ref 0–1.5)
BUN SERPL-MCNC: 13 MG/DL (ref 6–20)
BUN/CREAT SERPL: 24.5 (ref 7–25)
CALCIUM SPEC-SCNC: 9 MG/DL (ref 8.6–10.5)
CHLORIDE SERPL-SCNC: 105 MMOL/L (ref 98–107)
CO2 SERPL-SCNC: 23 MMOL/L (ref 22–29)
CREAT SERPL-MCNC: 0.53 MG/DL (ref 0.57–1)
DEPRECATED RDW RBC AUTO: 44 FL (ref 37–54)
EGFRCR SERPLBLD CKD-EPI 2021: 121.6 ML/MIN/1.73
EOSINOPHIL # BLD AUTO: 0.25 10*3/MM3 (ref 0–0.4)
EOSINOPHIL NFR BLD AUTO: 4 % (ref 0.3–6.2)
ERYTHROCYTE [DISTWIDTH] IN BLOOD BY AUTOMATED COUNT: 14.4 % (ref 12.3–15.4)
GLUCOSE SERPL-MCNC: 95 MG/DL (ref 65–99)
HCT VFR BLD AUTO: 35.7 % (ref 34–46.6)
HCYS SERPL-MCNC: 8.1 UMOL/L (ref 0–15)
HGB BLD-MCNC: 10.8 G/DL (ref 12–15.9)
IMM GRANULOCYTES # BLD AUTO: 0.03 10*3/MM3 (ref 0–0.05)
IMM GRANULOCYTES NFR BLD AUTO: 0.5 % (ref 0–0.5)
LYMPHOCYTES # BLD AUTO: 1.58 10*3/MM3 (ref 0.7–3.1)
LYMPHOCYTES NFR BLD AUTO: 25.2 % (ref 19.6–45.3)
MCH RBC QN AUTO: 25.4 PG (ref 26.6–33)
MCHC RBC AUTO-ENTMCNC: 30.3 G/DL (ref 31.5–35.7)
MCV RBC AUTO: 83.8 FL (ref 79–97)
MONOCYTES # BLD AUTO: 0.43 10*3/MM3 (ref 0.1–0.9)
MONOCYTES NFR BLD AUTO: 6.9 % (ref 5–12)
NEUTROPHILS NFR BLD AUTO: 3.92 10*3/MM3 (ref 1.7–7)
NEUTROPHILS NFR BLD AUTO: 62.6 % (ref 42.7–76)
NRBC BLD AUTO-RTO: 0 /100 WBC (ref 0–0.2)
PLATELET # BLD AUTO: 239 10*3/MM3 (ref 140–450)
PMV BLD AUTO: 9.5 FL (ref 6–12)
POTASSIUM SERPL-SCNC: 3.9 MMOL/L (ref 3.5–5.2)
QT INTERVAL: 396 MS
QT INTERVAL: 428 MS
QTC INTERVAL: 420 MS
QTC INTERVAL: 445 MS
RBC # BLD AUTO: 4.26 10*6/MM3 (ref 3.77–5.28)
SODIUM SERPL-SCNC: 138 MMOL/L (ref 136–145)
WBC NRBC COR # BLD: 6.26 10*3/MM3 (ref 3.4–10.8)

## 2023-09-21 PROCEDURE — 80048 BASIC METABOLIC PNL TOTAL CA: CPT | Performed by: STUDENT IN AN ORGANIZED HEALTH CARE EDUCATION/TRAINING PROGRAM

## 2023-09-21 PROCEDURE — G0378 HOSPITAL OBSERVATION PER HR: HCPCS

## 2023-09-21 PROCEDURE — 85025 COMPLETE CBC W/AUTO DIFF WBC: CPT | Performed by: STUDENT IN AN ORGANIZED HEALTH CARE EDUCATION/TRAINING PROGRAM

## 2023-09-21 RX ORDER — ACETAMINOPHEN 325 MG/1
650 TABLET ORAL EVERY 6 HOURS PRN
Qty: 30 TABLET | Refills: 0 | Status: SHIPPED | OUTPATIENT
Start: 2023-09-21

## 2023-09-21 RX ORDER — ROSUVASTATIN CALCIUM 20 MG/1
20 TABLET, COATED ORAL NIGHTLY
Status: DISCONTINUED | OUTPATIENT
Start: 2023-09-21 | End: 2023-09-21 | Stop reason: HOSPADM

## 2023-09-21 RX ORDER — LOSARTAN POTASSIUM 50 MG/1
50 TABLET ORAL
Qty: 30 TABLET | Refills: 0 | Status: SHIPPED | OUTPATIENT
Start: 2023-09-21

## 2023-09-21 RX ORDER — ROSUVASTATIN CALCIUM 20 MG/1
20 TABLET, COATED ORAL NIGHTLY
Qty: 30 TABLET | Refills: 0 | Status: SHIPPED | OUTPATIENT
Start: 2023-09-21

## 2023-09-21 RX ORDER — LOSARTAN POTASSIUM 50 MG/1
50 TABLET ORAL
Status: DISCONTINUED | OUTPATIENT
Start: 2023-09-21 | End: 2023-09-21 | Stop reason: HOSPADM

## 2023-09-21 RX ADMIN — CITALOPRAM 20 MG: 20 TABLET, FILM COATED ORAL at 08:23

## 2023-09-21 RX ADMIN — Medication 1 TABLET: at 08:23

## 2023-09-21 RX ADMIN — ACETAMINOPHEN 650 MG: 325 TABLET, FILM COATED ORAL at 04:26

## 2023-09-21 RX ADMIN — Medication 10 ML: at 08:23

## 2023-09-21 RX ADMIN — LOSARTAN POTASSIUM 50 MG: 50 TABLET, FILM COATED ORAL at 10:44

## 2023-09-21 RX ADMIN — PANTOPRAZOLE SODIUM 40 MG: 40 TABLET, DELAYED RELEASE ORAL at 06:40

## 2023-09-21 RX ADMIN — APIXABAN 10 MG: 5 TABLET, FILM COATED ORAL at 08:23

## 2023-09-21 RX ADMIN — ACETAMINOPHEN 650 MG: 325 TABLET, FILM COATED ORAL at 08:42

## 2023-09-21 NOTE — DISCHARGE SUMMARY
[facility] Medicine Services  DISCHARGE SUMMARY       Date of Admission: 9/20/2023  Date of Discharge:  9/21/2023  Primary Care Physician: Douglas Carnes MD    Presenting Problem/History of Present Illness:  Adapted from H&P from 9/20/23  Patient is a 37-year-old female with history of anxiety, morbid obesity and current who presents to the ER due to worsening shortness of breath.  Patient reports symptom onset was 3 days ago.  ER work-up was positive for an acute pulmonary embolus in the right upper, middle and lower lobe, with no evidence of a right heart strain.  Patient was started on treatment dose anticoagulation Lovenox.  Patient denies recent travel, tobacco use, illicit drug use, birth control use, or family history of blood clots.  Patient reports some occasional pains in the feet and shin.      Final Discharge Diagnoses:  Active Hospital Problems    Diagnosis     **Pulmonary emboli     Essential hypertension     Class 3 severe obesity due to excess calories with serious comorbidity and body mass index (BMI) of 60.0 to 69.9 in adult     Prediabetes     Obstructive sleep apnea        Consults: none     Procedures Performed: none     Pertinent Test Results:   Results for orders placed during the hospital encounter of 09/20/23    Adult Transthoracic Echo Complete w/ Color, Spectral and Contrast if necessary per protocol    Interpretation Summary    Left ventricular systolic function is normal. Left ventricular ejection fraction appears to be 61 - 65%.    Left ventricular wall thickness is consistent with mild to moderate concentric hypertrophy.    Normal right ventricular cavity size and systolic function noted.    No evidence of a patent foramen ovale.    No significant valvular abnormalities identified on this study.      Imaging Results (All)       Procedure Component Value Units Date/Time    US Venous Doppler Lower Extremity Bilateral (duplex) [[id number]]  Resulted: 09/20/23 1503     Updated: 09/20/23 1623    CT Abdomen Pelvis With Contrast [[id number]] Collected: 09/20/23 1111     Updated: 09/20/23 1122    Narrative:      EXAM: CT ABDOMEN PELVIS W CONTRAST-     INDICATION: Abdominal pain, acute, nonlocalized        TECHNIQUE: Helically acquired CT images were obtained of the abdomen and  pelvis after the administration of intravenous contrast. Coronal and  sagittal reformations were performed.        CONTRAST:  Isovue-300      DOSE LENGTH PRODUCT: 1722 mGy cm. Automated exposure control was also  utilized to decrease patient radiation dose.     COMPARISON: 3/1/2023     FINDINGS:     Lower Chest: Unremarkable.     Liver: Hepatic steatosis.     Biliary Tree: Status post cholecystectomy.     Spleen: Unremarkable.     Pancreas: Unremarkable.     Adrenal Glands: Unremarkable.     Kidneys/Ureters/Bladder: Subcentimeter low-density lesions in the  bilateral kidneys are too small to characterize but likely unchanged,  most likely representing a small cyst.     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 This has progressively  increased in size dating back to 3/15/2022. This currently measures up  to 12.7 x 5.2 cm, 2.8 x 1.8 cm on 3/15/2022.     Gastrointestinal Tract: Suture line in the sigmoid colon. Colonic  diverticulosis. See below findings.     Lymphatics: Unremarkable.     Vasculature: Unremarkable.      Peritoneum/Retroperitoneum: Unremarkable.     Abdominal Wall/Soft Tissues: Rectus diastases with a fat and small bowel  containing hernia without evidence of obstruction.     Osseous Structures: Unremarkable.       Impression:            No acute findings in the abdomen/pelvis.     Fat attenuating lesion within the myometrium has progressively increased  in size dating back to 3/15/2022. This is favored to represent a  lipoleiomyoma, however given significant increase in size recommend  OB/GYN consultation.      Hepatic steatosis.     This report was signed and finalized on 9/20/2023 11:19 AM CDT by Renzo Myers.       CT Angiogram Chest [[id number]] Collected: 09/20/23 1057     Updated: 09/20/23 1105    Narrative:      CT ANGIOGRAM CHEST- 9/20/2023 10:41 AM CDT      HISTORY: Pulmonary embolism (PE) suspected, unknown D-dimer      COMPARISON: CT scan dated 9/29/2021     DOSE LENGTH PRODUCT: 245 mGy cm. Automated exposure control was also  utilized to decrease patient radiation dose.     TECHNIQUE: Helical tomographic images of the chest were obtained after  the administration of intravenous contrast following angiogram protocol.  Additionally, 3D and multiplanar reformatted images were provided.       FINDINGS:    Pulmonary arteries: There is adequate enhancement of the pulmonary  arteries to evaluate for central and segmental pulmonary emboli. Acute  pulmonary embolus identified in the right lower lobe pulmonary artery  extending down into the segmental pulmonary arteries. There is also  embolus in the right middle lobe pulmonary artery which extends into the  medial segment pulmonary artery. There is segmental and subsegmental  embolus identified in the right upper lobe pulmonary arteries. No  pulmonary embolus identified on the left. Central pulmonary arteries are  nondilated.     AORTA AND GREAT VESSELS: Thoracic aorta is normal in caliber. No  dissection identified. No flow-limiting stenosis identified at the great  vessel origins.     VISUALIZED NECK BASE: The imaged portion of the base of the neck appears  unremarkable.     LUNGS: The lungs are clear. There is no mass, worrisome nodule, or  consolidation. No pleural effusion is seen. Airways are clear.     HEART: The heart is normal in size. There is no pericardial effusion.     MEDIASTINUM AND LYMPH NODEs: No suspicious hilar or mediastinal  adenopathy..     SKELETAL AND SOFT TISSUES: Chest wall soft tissues are unremarkable. No  acute bony abnormality. Thoracic  spine degenerative change.     UPPER ABDOMEN: The imaged portion of the upper abdomen demonstrates no  acute process.       Impression:      1.  Acute pulmonary embolus identified in the right upper, right middle  and right lower lobes. No pulmonary embolus identified on the left. The  central pulmonary arteries are nondilated. No CT evidence for right  heart strain.  2.  Lungs are clear and well expanded.     Findings and recommendations were discussed with PIA HORNE on  9/20/2023 11:01 AM CDT at 9/20/2023 11:01 AM CDT.     This report was signed and finalized on 9/20/2023 11:02 AM CDT by Dr Mati Womack.       XR Chest 1 View [[id number]] Collected: 09/20/23 0938     Updated: 09/20/23 0942    Narrative:      XR CHEST 1 VW-     HISTORY: Chest Pain Protocol     COMPARISON: 9/29/2021     FINDINGS: Frontal view of the chest obtained.     Slightly diminished level of inspiration. Lungs are free of  consolidation, collapse, or edema. The heart appears borderline  enlarged. No radiographic evidence of edema. No pleural effusion or  pneumothorax. No acute regional bony pathology.          Impression:      Borderline cardiomegaly on this portable AP study. Diminished level of  inspiration with no acute pulmonary process.     This report was signed and finalized on 9/20/2023 9:39 AM CDT by Dr. Alisa Morales MD.             LAB RESULTS:      Lab 09/21/23 0418 09/20/23  0849   WBC 6.26 6.87   HEMOGLOBIN 10.8* 11.5*   HEMATOCRIT 35.7 37.6   PLATELETS 239 241   NEUTROS ABS 3.92 4.81   IMMATURE GRANS (ABS) 0.03 0.03   LYMPHS ABS 1.58 1.47   MONOS ABS 0.43 0.36   EOS ABS 0.25 0.16   MCV 83.8 83.0   D DIMER QUANT  --  1.73*         Lab 09/21/23  0418 09/20/23  1717 09/20/23  0849   SODIUM 138  --  134*   POTASSIUM 3.9  --  4.0   CHLORIDE 105  --  102   CO2 23.0  --  21.0*   ANION GAP 10.0  --  11.0   BUN 13  --  13   CREATININE 0.53*  --  0.61   EGFR 121.6  --  117.5   GLUCOSE 95  --  106*   CALCIUM 9.0  --  8.5*  "  HEMOGLOBIN A1C  --  5.70*  --          Lab 09/20/23  0849   TOTAL PROTEIN 7.1   ALBUMIN 4.0   GLOBULIN 3.1   ALT (SGPT) 60*   AST (SGOT) 51*   BILIRUBIN 0.4   ALK PHOS 108   LIPASE 15         Lab 09/20/23  1034 09/20/23  0849   HSTROP T <6 <6         Lab 09/20/23  1034   CHOLESTEROL 194   LDL CHOL 130*   HDL CHOL 32*   TRIGLYCERIDES 179*             Brief Urine Lab Results  (Last result in the past 365 days)        Color   Clarity   Blood   Leuk Est   Nitrite   Protein   CREAT   Urine HCG        09/20/23 0910               Negative             Microbiology Results (last 10 days)       ** No results found for the last 240 hours. **            Hospital Course: Patient is a 37-year-old female with history of anxiety, morbid obesity who was admitted for an acute pulmonary embolism without a right heart strain.  Patient was started on full anticoagulation with treatment dose Lovenox.  Hypercoagulability testing were obtained and are pending at the moment.  Patient was transitioned to DOAC for PE therapy.  Echocardiogram and venous Doppler ultrasound were obtained.  Echo showed EF of 61 to 65%.  With left ventricular wall thickness which is consistent with mild to moderate concentric hypertrophy.  Blood pressure was elevated during hospital stay.  Patient was started on losartan.  Had an extensive discussion with patient about lifestyle modification to help with weight loss.  Patient advised to follow-up with PCP and discuss all her options.  I suspect this is likely provoked event since patient job requires her to sit all day.  Referral to hematology was placed.  Hypercoagulability studies and venous Doppler ultrasound of lower extremities are still pending.      Physical Exam on Discharge:  /67 (BP Location: Left arm, Patient Position: Lying)   Pulse 84   Temp 97.9 °F (36.6 °C) (Oral)   Resp 18   Ht 152.4 cm (60\")   Wt (!) 145 kg (320 lb)   LMP 09/06/2023   SpO2 96%   BMI 62.50 kg/m²   Physical " Exam  Vitals and nursing note reviewed.   Constitutional:       General: She is not in acute distress.     Appearance: She is morbidly obese. She is not diaphoretic.   HENT:      Head: Normocephalic and atraumatic.      Right Ear: External ear normal.      Left Ear: External ear normal.   Cardiovascular:      Rate and Rhythm: Normal rate and regular rhythm.      Heart sounds: Normal heart sounds.   Pulmonary:      Effort: Pulmonary effort is normal. No respiratory distress.      Breath sounds: Normal breath sounds.   Chest:      Chest wall: No tenderness.   Abdominal:      General: Bowel sounds are normal.      Palpations: Abdomen is soft.      Tenderness: There is no abdominal tenderness.   Musculoskeletal:         General: No swelling or tenderness.      Cervical back: Normal range of motion and neck supple.      Right lower leg: No edema.      Left lower leg: No edema.   Skin:     General: Skin is warm and dry.      Capillary Refill: Capillary refill takes less than 2 seconds.      Findings: No erythema or rash.   Neurological:      General: No focal deficit present.      Mental Status: She is alert and oriented to person, place, and time.      Motor: No weakness.   Psychiatric:         Behavior: Behavior normal.         Condition on Discharge: stable and improving     Discharge Disposition:  Home or Self Care    Discharge Medications:     Discharge Medications        New Medications        Instructions Start Date   acetaminophen 325 MG tablet  Commonly known as: TYLENOL   650 mg, Oral, Every 6 Hours PRN      apixaban 5 MG tablet tablet  Commonly known as: ELIQUIS   10 mg, Oral, Every 12 Hours Scheduled      apixaban 5 MG tablet tablet  Commonly known as: ELIQUIS   5 mg, Oral, Every 12 Hours Scheduled   Start Date: September 27, 2023     losartan 50 MG tablet  Commonly known as: COZAAR   50 mg, Oral, Every 24 Hours Scheduled      rosuvastatin 20 MG tablet  Commonly known as: CRESTOR   20 mg, Oral, Nightly              Continue These Medications        Instructions Start Date   busPIRone 15 MG tablet  Commonly known as: BUSPAR   15 mg, Oral, 3 Times Daily      citalopram 10 MG tablet  Commonly known as: CeleXA   10 mg, Oral, Daily             Stop These Medications      ibuprofen 200 MG tablet  Commonly known as: ADVIL,MOTRIN                Discharge Diet:   Diet Instructions       Diet: Cardiac Diets; Healthy Heart (2-3 Na+); Thin (IDDSI 0)      Discharge Diet: Cardiac Diets    Cardiac Diet: Healthy Heart (2-3 Na+)    Fluid Consistency: Thin (IDDSI 0)            Activity at Discharge:   Activity Instructions       Activity as Tolerated              Follow-up Appointments:   Future Appointments   Date Time Provider Department Center   3/13/2024  9:00 AM Lisbet Vargas APRN [facility]       Test Results Pending at Discharge:  Hypercoagulability studies and venous Doppler ultrasound of lower extremities    Electronically signed by Jaqui East MD, 09/21/23, 10:29 CDT.    Time: 32 minutes.

## 2023-09-21 NOTE — PAYOR COMM NOTE
"ADMIT AS OBSERVATION ON 9-20-23  UR  [phone]    Grant Cordon (37 y.o. Female)       Date of Birth   1985    Social Security Number       Address   [address] [place], [place] [address]    Home Phone   [phone]    MRN   [id number]       [employer]ism   Anabaptist    Marital Status   Single                            Admission Date   9/20/23    Admission Type   Emergency    Admitting Provider   Jaqui East MD    Attending Provider   Jaqui East MD    Department, Room/Bed   [facility] 4B, 456/1       Discharge Date       Discharge Disposition       Discharge Destination                                 Attending Provider: Jaqui East MD    Allergies: No Known Allergies    Isolation: None   Infection: None   Code Status: CPR    Ht: 152.4 cm (60\")   Wt: 145 kg (320 lb)    Admission Cmt: None   Principal Problem: Pulmonary emboli [I26.99]                   Active Insurance as of 9/20/2023       Primary Coverage       Payor Plan Insurance Group Employer/Plan Group    [facility] [id number]       Payor Plan Address Payor Plan Phone Number Payor Plan Fax Number Effective Dates    [address]   1/1/2017 - None Entered    [facility] [id number]         Subscriber Name Subscriber Birth Date Member ID       GRANT CORDON 1985 [id number]                     Emergency Contacts        (Rel.) Home Phone Work Phone Mobile Phone    Rao Banerjee (Brother) -- -- [phone]                 History & Physical        Jaqui East MD at 09/20/23 [address] [place] [address] Medicine Services  HISTORY AND PHYSICAL    Date of Admission: 9/20/2023  Primary Care Physician: Douglas Carnes MD    Subjective   Primary Historian: Patient     Chief Complaint: SOB     History of Present Illness  Patient is a 37-year-old female with history of anxiety, morbid obesity and current who presents to the " ER due to worsening shortness of breath.  Patient reports symptom onset was 3 days ago.  ER work-up was positive for an acute pulmonary embolus in the right upper, middle and lower lobe, with no evidence of a right heart strain.  Patient was started on treatment dose anticoagulation Lovenox.  Patient denies recent travel, tobacco use, illicit drug use, birth control use, or family history of blood clots.  Patient reports some occasional pains in the feet and shin.         Review of Systems   Respiratory:  Negative for shortness of breath.     Otherwise complete ROS reviewed and negative except as mentioned in the HPI.    Past Medical History:   Past Medical History:   Diagnosis Date    Abdominal hernia     Anemia     Anxiety     Diverticulitis     Fibroid     GERD (gastroesophageal reflux disease)     History of swelling of feet     PMS (premenstrual syndrome)      Past Surgical History:  Past Surgical History:   Procedure Laterality Date    ADENOIDECTOMY      APPENDECTOMY      CHOLECYSTECTOMY      open    HERNIA REPAIR      x3 living tissue    LAPAROSCOPIC CHOLECYSTECTOMY      LAPAROSCOPIC COLON RESECTION      LYMPH NODE DISSECTION      TONSILLECTOMY       Social History:  reports that she has never smoked. She has never used smokeless tobacco. She reports current alcohol use of about 2.0 standard drinks per week. She reports that she does not use drugs.    Family History: family history includes Aneurysm in her paternal grandmother; Breast cancer in her paternal aunt; COPD in her father; Cancer in her maternal grandfather; Colon cancer in her paternal aunt and paternal aunt; Coronary artery disease in her father; Crohn's disease in her brother; Emphysema in her father; Heart attack in her paternal grandfather; Heart disease in her paternal grandfather; Hypereosinophilic syndrome in her brother; Hyperlipidemia in her father; Hypertension in her father and maternal grandmother; Hypothyroidism in her father and  mother; Lupus in her mother; Osteoporosis in her mother; Rheum arthritis in her brother and mother; Stroke in her maternal grandmother.       Allergies:  No Known Allergies    Medications:  Prior to Admission medications    Medication Sig Start Date End Date Taking? Authorizing Provider   busPIRone (BUSPAR) 15 MG tablet Take 1 tablet by mouth 3 (Three) Times a Day.   Yes Na Arguelles MD   citalopram (CeleXA) 20 MG tablet Take 1 tablet by mouth Daily.   Yes Na Arguelles MD   ibuprofen (ADVIL,MOTRIN) 200 MG tablet Take  by mouth Every 6 (Six) Hours.   Yes Na Arguelles MD   omeprazole (priLOSEC) 20 MG capsule Take 1 capsule by mouth Daily As Needed. 7/9/21  Yes Na Arguelles MD   albuterol sulfate  (90 Base) MCG/ACT inhaler 2 puffs Every 6 (Six) Hours As Needed. 10/24/22   Na Arguelles MD   dicyclomine (BENTYL) 20 MG tablet Take 1 tablet by mouth Every 6 (Six) Hours.  Patient taking differently: Take 1 tablet by mouth Every 6 (Six) Hours As Needed. 9/28/20   Fiorella Sandoval MD   furosemide (LASIX) 20 MG tablet Take  by mouth Daily.    ProviderNa MD   meloxicam (MOBIC) 15 MG tablet Take  by mouth. 3/16/17   Na Arguelles MD   meloxicam (MOBIC) 15 MG tablet Take 1 tablet by mouth Daily. 11/16/22   Na Arguelles MD   nystatin (MYCOSTATIN) 695731 UNIT/GM cream Apply 1 application topically to the appropriate area as directed 2 (Two) Times a Day. 3/6/23   Lisbet Vargas APRN   nystatin-triamcinolone (MYCOLOG) 229750-7.1 UNIT/GM-% ointment Apply  topically to the appropriate area as directed 2 (Two) Times a Day. 9/14/21   Lisbet Vargas APRN   ondansetron ODT (ZOFRAN-ODT) 4 MG disintegrating tablet Place 1 tablet on the tongue Every 8 (Eight) Hours As Needed for Nausea or Vomiting. 9/28/20   Fiorella Sandoval MD   potassium chloride (MICRO-K) 10 MEQ CR capsule Take  by mouth Daily.    ProviderNa MD   Saxenda 18 MG/3ML  "injection pen START 0.6 MG SUBCUTANEOUSLY DAILY X1 WEEK THEN INCREASE BY 0.6 MG WEEKLY UNTIL TARGET DOSE OF 3 MG 12/23/22   ProviderNa MD   therapeutic multivitamin-minerals (THERAGRAN-M) tablet Take  by mouth.    Provider, MD Na     I have utilized all available immediate resources to obtain, update, or review the patient's current medications (including all prescriptions, over-the-counter products, herbals, cannabis/cannabidiol products, and vitamin/mineral/dietary (nutritional) supplements).    Objective     Vital Signs: /55   Pulse 81   Temp 98.3 °F (36.8 °C) (Oral)   Resp 16   Ht 152.4 cm (60\")   Wt (!) 145 kg (320 lb)   LMP 09/06/2023   SpO2 96%   BMI 62.50 kg/m²   Physical Exam  Vitals and nursing note reviewed.   Constitutional:       General: She is not in acute distress.     Appearance: She is morbidly obese. She is not diaphoretic.   HENT:      Head: Normocephalic and atraumatic.      Right Ear: External ear normal.      Left Ear: External ear normal.   Eyes:      General: No scleral icterus.     Extraocular Movements: Extraocular movements intact.      Conjunctiva/sclera: Conjunctivae normal.   Cardiovascular:      Rate and Rhythm: Normal rate and regular rhythm.      Heart sounds: Normal heart sounds.   Pulmonary:      Effort: Pulmonary effort is normal. No respiratory distress.      Breath sounds: Normal breath sounds.   Chest:      Chest wall: No tenderness.   Abdominal:      General: Bowel sounds are normal.      Palpations: Abdomen is soft.      Tenderness: There is no abdominal tenderness.      Hernia: A hernia is present.   Musculoskeletal:         General: No swelling or tenderness.      Cervical back: Normal range of motion and neck supple.      Right lower leg: No edema.      Left lower leg: No edema.   Skin:     General: Skin is warm and dry.      Capillary Refill: Capillary refill takes less than 2 seconds.      Findings: No erythema or rash.   Neurological: "      General: No focal deficit present.      Mental Status: She is alert and oriented to person, place, and time.      Motor: No weakness.   Psychiatric:         Behavior: Behavior normal.            Results Reviewed:  Lab Results (last 24 hours)       Procedure Component Value Units Date/Time    Lipid Panel [[id number]] Collected: 09/20/23 1034    Specimen: Blood Updated: 09/20/23 1504    Factor 5 Leiden [[id number]] Collected: 09/20/23 0849    Specimen: Blood Updated: 09/20/23 1439    High Sensitivity Troponin T 2Hr [[id number]] Collected: 09/20/23 1034    Specimen: Blood Updated: 09/20/23 1112     HS Troponin T <6 ng/L      Troponin T Delta --     Comment: Unable to calculate.       Narrative:      High Sensitive Troponin T Reference Range:  <10.0 ng/L- Negative Female for AMI  <15.0 ng/L- Negative Male for AMI  >=10 - Abnormal Female indicating possible myocardial injury.  >=15 - Abnormal Male indicating possible myocardial injury.   Clinicians would have to utilize clinical acumen, EKG, Troponin, and serial changes to determine if it is an Acute Myocardial Infarction or myocardial injury due to an underlying chronic condition.         [place] Draw [[id number]] Collected: 09/20/23 0849    Specimen: Blood Updated: 09/20/23 1000    Narrative:      The following orders were created for panel order [place] Draw.  Procedure                               Abnormality         Status                     ---------                               -----------         ------                     Green Top (Gel)[[id number]]                                  Final result               Lavender Top[[id number]]                                     Final result               Red Top[[id number]]                                          Final result               Light Blue Top[[id number]]                                   Final result                 Please view results for these tests on the individual orders.    Lavender Top [[id number]]  Collected: 09/20/23 0849    Specimen: Blood Updated: 09/20/23 1000     Extra Tube hold for add-on     Comment: Auto resulted       Red Top [[id number]] Collected: 09/20/23 0849    Specimen: Blood Updated: 09/20/23 1000     Extra Tube Hold for add-ons.     Comment: Auto resulted.       Light Blue Top [[id number]] Collected: 09/20/23 0849    Specimen: Blood Updated: 09/20/23 1000     Extra Tube Hold for add-ons.     Comment: Auto resulted       Green Top (Gel) [[id number]] Collected: 09/20/23 0849    Specimen: Blood Updated: 09/20/23 1000     Extra Tube Hold for add-ons.     Comment: Auto resulted.       Comprehensive Metabolic Panel [[id number]]  (Abnormal) Collected: 09/20/23 0849    Specimen: Blood Updated: 09/20/23 0936     Glucose 106 mg/dL      BUN 13 mg/dL      Creatinine 0.61 mg/dL      Sodium 134 mmol/L      Potassium 4.0 mmol/L      Chloride 102 mmol/L      CO2 21.0 mmol/L      Calcium 8.5 mg/dL      Total Protein 7.1 g/dL      Albumin 4.0 g/dL      ALT (SGPT) 60 U/L      AST (SGOT) 51 U/L      Alkaline Phosphatase 108 U/L      Total Bilirubin 0.4 mg/dL      Globulin 3.1 gm/dL      A/G Ratio 1.3 g/dL      BUN/Creatinine Ratio 21.3     Anion Gap 11.0 mmol/L      eGFR 117.5 mL/min/1.73     Narrative:      GFR Normal >60  Chronic Kidney Disease <60  Kidney Failure <15      High Sensitivity Troponin T [[id number]]  (Normal) Collected: 09/20/23 0849    Specimen: Blood Updated: 09/20/23 0931     HS Troponin T <6 ng/L     Narrative:      High Sensitive Troponin T Reference Range:  <10.0 ng/L- Negative Female for AMI  <15.0 ng/L- Negative Male for AMI  >=10 - Abnormal Female indicating possible myocardial injury.  >=15 - Abnormal Male indicating possible myocardial injury.   Clinicians would have to utilize clinical acumen, EKG, Troponin, and serial changes to determine if it is an Acute Myocardial Infarction or myocardial injury due to an underlying chronic condition.         Lipase [[id number]]  (Normal) Collected:  "09/20/23 0849    Specimen: Blood Updated: 09/20/23 0931     Lipase 15 U/L     D-dimer, Quantitative [[id number]]  (Abnormal) Collected: 09/20/23 0849    Specimen: Blood Updated: 09/20/23 0923     D-Dimer, Quantitative 1.73 MCGFEU/mL     Narrative:      According to the assay 's published package insert, a normal (<0.50 MCGFEU/mL) D-dimer result in conjunction with a non-high clinical probability assessment, excludes deep vein thrombosis (DVT) and pulmonary embolism (PE) with high sensitivity.    D-dimer values increase with age and this can make VTE exclusion of an older population difficult. To address this, the American College of Phys[employer], based on best available evidence and recent guidelines, recommends that clinicians use age-adjusted D-dimer thresholds in patients greater than 50 years of age with: a) a low probability of PE who do not meet all Pulmonary Embolism Rule Out Criteria, or b) in those with intermediate probability of PE.   The formula for an age-adjusted D-dimer cut-off is \"age/100\".  For example, a 60 year old patient would have an age-adjusted cut-off of 0.60 MCGFEU/mL and an 80 year old 0.80 MCGFEU/mL.    Urinalysis With Culture If Indicated - Urine, Clean Catch [[id number]]  (Normal) Collected: 09/20/23 0901    Specimen: Urine, Clean Catch Updated: 09/20/23 0913     Color, UA Yellow     Appearance, UA Clear     pH, UA 6.0     Specific Gravity, UA 1.019     Glucose, UA Negative     Ketones, UA Negative     Bilirubin, UA Negative     Blood, UA Negative     Protein, UA Negative     Leuk Esterase, UA Negative     Nitrite, UA Negative     Urobilinogen, UA 0.2 E.U./dL    Narrative:      In absence of clinical symptoms, the presence of pyuria, bacteria, and/or nitrites on the urinalysis result does not correlate with infection.  Urine microscopic not indicated.    CBC & Differential [[id number]]  (Abnormal) Collected: 09/20/23 0849    Specimen: Blood Updated: 09/20/23 0913    Narrative:   "    The following orders were created for panel order CBC & Differential.  Procedure                               Abnormality         Status                     ---------                               -----------         ------                     CBC Auto Differential[[id number]]        Abnormal            Final result                 Please view results for these tests on the individual orders.    CBC Auto Differential [[id number]]  (Abnormal) Collected: 09/20/23 0849    Specimen: Blood Updated: 09/20/23 0913     WBC 6.87 10*3/mm3      RBC 4.53 10*6/mm3      Hemoglobin 11.5 g/dL      Hematocrit 37.6 %      MCV 83.0 fL      MCH 25.4 pg      MCHC 30.6 g/dL      RDW 14.4 %      RDW-SD 43.2 fl      MPV 9.4 fL      Platelets 241 10*3/mm3      Neutrophil % 70.1 %      Lymphocyte % 21.4 %      Monocyte % 5.2 %      Eosinophil % 2.3 %      Basophil % 0.6 %      Immature Grans % 0.4 %      Neutrophils, Absolute 4.81 10*3/mm3      Lymphocytes, Absolute 1.47 10*3/mm3      Monocytes, Absolute 0.36 10*3/mm3      Eosinophils, Absolute 0.16 10*3/mm3      Basophils, Absolute 0.04 10*3/mm3      Immature Grans, Absolute 0.03 10*3/mm3      nRBC 0.0 /100 WBC     POC Urine Pregnancy [[id number]]  (Normal) Collected: 09/20/23 0910    Specimen: Urine Updated: 09/20/23 0911     HCG, Urine, QL Negative     Lot Number [id number]     Internal Positive Control Positive     Internal Negative Control Negative     Expiration Date 1,102,025          Imaging Results (Last 24 Hours)       Procedure Component Value Units Date/Time    US Venous Doppler Lower Extremity Bilateral (duplex) [[id number]] Resulted: 09/20/23 1503     Updated: 09/20/23 1503    CT Abdomen Pelvis With Contrast [[id number]] Collected: 09/20/23 1111     Updated: 09/20/23 1122    Narrative:      EXAM: CT ABDOMEN PELVIS W CONTRAST-     INDICATION: Abdominal pain, acute, nonlocalized        TECHNIQUE: Helically acquired CT images were obtained of the abdomen and  pelvis after the  administration of intravenous contrast. Coronal and  sagittal reformations were performed.        CONTRAST:  Isovue-300      DOSE LENGTH PRODUCT: 1722 mGy cm. Automated exposure control was also  utilized to decrease patient radiation dose.     COMPARISON: 3/1/2023     FINDINGS:     Lower Chest: Unremarkable.     Liver: Hepatic steatosis.     Biliary Tree: Status post cholecystectomy.     Spleen: Unremarkable.     Pancreas: Unremarkable.     Adrenal Glands: Unremarkable.     Kidneys/Ureters/Bladder: Subcentimeter low-density lesions in the  bilateral kidneys are too small to characterize but likely unchanged,  most likely representing a small cyst.     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 This has progressively  increased in size dating back to 3/15/2022. This currently measures up  to 12.7 x 5.2 cm, 2.8 x 1.8 cm on 3/15/2022.     Gastrointestinal Tract: Suture line in the sigmoid colon. Colonic  diverticulosis. See below findings.     Lymphatics: Unremarkable.     Vasculature: Unremarkable.      Peritoneum/Retroperitoneum: Unremarkable.     Abdominal Wall/Soft Tissues: Rectus diastases with a fat and small bowel  containing hernia without evidence of obstruction.     Osseous Structures: Unremarkable.       Impression:            No acute findings in the abdomen/pelvis.     Fat attenuating lesion within the myometrium has progressively increased  in size dating back to 3/15/2022. This is favored to represent a  lipoleiomyoma, however given significant increase in size recommend  OB/GYN consultation.     Hepatic steatosis.     This report was signed and finalized on 9/20/2023 11:19 AM CDT by Renzo Myers.       CT Angiogram Chest [[id number]] Collected: 09/20/23 1057     Updated: 09/20/23 1105    Narrative:      CT ANGIOGRAM CHEST- 9/20/2023 10:41 AM CDT      HISTORY: Pulmonary embolism (PE) suspected, unknown D-dimer      COMPARISON: CT scan dated  9/29/2021     DOSE LENGTH PRODUCT: 245 mGy cm. Automated exposure control was also  utilized to decrease patient radiation dose.     TECHNIQUE: Helical tomographic images of the chest were obtained after  the administration of intravenous contrast following angiogram protocol.  Additionally, 3D and multiplanar reformatted images were provided.       FINDINGS:    Pulmonary arteries: There is adequate enhancement of the pulmonary  arteries to evaluate for central and segmental pulmonary emboli. Acute  pulmonary embolus identified in the right lower lobe pulmonary artery  extending down into the segmental pulmonary arteries. There is also  embolus in the right middle lobe pulmonary artery which extends into the  medial segment pulmonary artery. There is segmental and subsegmental  embolus identified in the right upper lobe pulmonary arteries. No  pulmonary embolus identified on the left. Central pulmonary arteries are  nondilated.     AORTA AND GREAT VESSELS: Thoracic aorta is normal in caliber. No  dissection identified. No flow-limiting stenosis identified at the great  vessel origins.     VISUALIZED NECK BASE: The imaged portion of the base of the neck appears  unremarkable.     LUNGS: The lungs are clear. There is no mass, worrisome nodule, or  consolidation. No pleural effusion is seen. Airways are clear.     HEART: The heart is normal in size. There is no pericardial effusion.     MEDIASTINUM AND LYMPH NODEs: No suspicious hilar or mediastinal  adenopathy..     SKELETAL AND SOFT TISSUES: Chest wall soft tissues are unremarkable. No  acute bony abnormality. Thoracic spine degenerative change.     UPPER ABDOMEN: The imaged portion of the upper abdomen demonstrates no  acute process.       Impression:      1.  Acute pulmonary embolus identified in the right upper, right middle  and right lower lobes. No pulmonary embolus identified on the left. The  central pulmonary arteries are nondilated. No CT evidence for  right  heart strain.  2.  Lungs are clear and well expanded.     Findings and recommendations were discussed with PIA HORNE on  9/20/2023 11:01 AM CDT at 9/20/2023 11:01 AM CDT.     This report was signed and finalized on 9/20/2023 11:02 AM CDT by Dr Mati Womack.       XR Chest 1 View [[id number]] Collected: 09/20/23 0938     Updated: 09/20/23 0942    Narrative:      XR CHEST 1 VW-     HISTORY: Chest Pain Protocol     COMPARISON: 9/29/2021     FINDINGS: Frontal view of the chest obtained.     Slightly diminished level of inspiration. Lungs are free of  consolidation, collapse, or edema. The heart appears borderline  enlarged. No radiographic evidence of edema. No pleural effusion or  pneumothorax. No acute regional bony pathology.          Impression:      Borderline cardiomegaly on this portable AP study. Diminished level of  inspiration with no acute pulmonary process.     This report was signed and finalized on 9/20/2023 9:39 AM CDT by Dr. Alisa Morales MD.             I have personally reviewed and interpreted the radiology studies and ECG obtained at time of admission.     Assessment / Plan   Assessment:   Active Hospital Problems    Diagnosis     **Pulmonary emboli     Pulmonary embolism     Class 3 severe obesity due to excess calories with serious comorbidity and body mass index (BMI) of 60.0 to 69.9 in adult     Obstructive sleep apnea        Treatment Plan  The patient will be admitted to my service here at [facility].  Acute pulmonary embolism: Start patient on full anticoagulation.  - Obtain hypercoagulability study  - Echocardiogram  - Bilateral lower extremity Doppler  - Cardiac monitoring  - Vital signs per protocol    We will had hemoglobin A1c, lipid panel to labs    CPAP to sleep overnight    Morbid obesity: Body mass index is 62.5 kg/m².  PT OT when patient      Medical Decision Making  Number and Complexity of problems: 1 acute medical problem with high  complexity.  Differential Diagnosis: As above    Conditions and Status        Condition is unchanged.     [facility] Data  External documents reviewed: Epic records  Cardiac tracing (EKG, telemetry) interpretation: EKG reviewed  Radiology interpretation: Imaging reviewed  Labs reviewed: Yes  Any tests that were considered but not ordered: Will order hypercoagulability test.      Decision rules/scores evaluated (example [id number], Wells, etc): N/A     Discussed with: Patient     Care Planning  Shared decision making: Patient apprised of current labs, vitals, imaging and treatment plan.  They are agreeable with proceeding with plans as discussed.    Code status and discussions:   Code Status and Medical Interventions:   Ordered at: 09/20/23 1430     Level Of Support Discussed With:    Patient     Code Status (Patient has no pulse and is not breathing):    CPR (Attempt to Resuscitate)     Medical Interventions (Patient has pulse or is breathing):    Full Support         Disposition  Social Determinants of Health that impact treatment or disposition: none  Estimated length of stay is 1-2 days.     I confirmed that the patient's advanced care plan is present, code status is documented, and a surrogate decision maker is listed in the patient's medical record.     The patient's surrogate decision maker is patient's brother Rao Banerjee, updated ACP documentation.  Patient will provide once her cell phone gets charged.    The patient was seen and examined by me on 9/20/2023 at 1:50 PM.    Electronically signed by Jaqui East MD, 09/20/23, 15:17 CDT.               Electronically signed by Jaqui East MD at 09/20/23 1517          Emergency Department Notes        Fiorella Sandoval MD at 09/20/23 0836          Subjective   History of Present Illness  Patient is a 37-year-old female with a history of GERD who presents to the ER with chest pain and shortness of breath.  Patient states she was walking yesterday around  5 to 6 PM when she developed midsternal chest pain.  She describes it as a tightness that radiates to her left shoulder.  Pain has been constant since onset but varies in intensity and is worse with ambulation.  She does have associated shortness of air.  Patient also complains of left upper quadrant abdominal pain that she has had intermittently for the last month.  Patient states the pain is worse with eating.  She denies any fever, nausea vomiting diarrhea, urinary changes, neurologic changes.  She denies a history of MI or pulmonary embolus.    Review of Systems   Constitutional: Negative.    HENT: Negative.     Eyes: Negative.    Respiratory:  Positive for shortness of breath.    Cardiovascular:  Positive for chest pain.   Gastrointestinal:  Positive for abdominal pain.   Endocrine: Negative.    Genitourinary: Negative.    Musculoskeletal: Negative.    Skin: Negative.    Allergic/Immunologic: Negative.    Neurological: Negative.    Hematological: Negative.    Psychiatric/Behavioral: Negative.     All other systems reviewed and are negative.    Past Medical History:   Diagnosis Date    Abdominal hernia     Anemia     Anxiety     Diverticulitis     Fibroid     GERD (gastroesophageal reflux disease)     History of swelling of feet     PMS (premenstrual syndrome)        No Known Allergies    Past Surgical History:   Procedure Laterality Date    ADENOIDECTOMY      APPENDECTOMY      CHOLECYSTECTOMY      open    HERNIA REPAIR      x3 living tissue    LAPAROSCOPIC CHOLECYSTECTOMY      LAPAROSCOPIC COLON RESECTION      LYMPH NODE DISSECTION      TONSILLECTOMY         Family History   Problem Relation Age of Onset    Lupus Mother     Osteoporosis Mother     Rheum arthritis Mother     Hypothyroidism Mother     COPD Father     Coronary artery disease Father     Emphysema Father     Hypertension Father     Hyperlipidemia Father     Hypothyroidism Father     Rheum arthritis Brother     Hypereosinophilic syndrome Brother      Crohn's disease Brother     Breast cancer Paternal Aunt     Colon cancer Paternal Aunt     Colon cancer Paternal Aunt     Hypertension Maternal Grandmother     Stroke Maternal Grandmother     Cancer Maternal Grandfather     Aneurysm Paternal Grandmother     Heart disease Paternal Grandfather     Heart attack Paternal Grandfather     Ovarian cancer Neg Hx     Uterine cancer Neg Hx        Social History     Socioeconomic History    Marital status: Single   Tobacco Use    Smoking status: Never    Smokeless tobacco: Never   Substance and Sexual Activity    Alcohol use: Yes     Alcohol/week: 2.0 standard drinks     Types: 1 Glasses of wine, 1 Cans of beer per week     Comment: rarely    Drug use: No    Sexual activity: Yes     Partners: Male     Birth control/protection: OCP           Objective   Physical Exam  Vitals and nursing note reviewed.   Constitutional:       Appearance: She is well-developed.   HENT:      Head: Normocephalic and atraumatic.   Eyes:      Conjunctiva/sclera: Conjunctivae normal.      Pupils: Pupils are equal, round, and reactive to light.   Cardiovascular:      Rate and Rhythm: Normal rate and regular rhythm.      Heart sounds: Normal heart sounds.   Pulmonary:      Effort: Pulmonary effort is normal.      Breath sounds: Normal breath sounds.   Abdominal:      Palpations: Abdomen is soft.      Tenderness: There is no abdominal tenderness. There is no right CVA tenderness, left CVA tenderness, guarding or rebound.   Musculoskeletal:         General: No deformity. Normal range of motion.      Cervical back: Normal range of motion.   Skin:     General: Skin is warm.   Neurological:      Mental Status: She is alert and oriented to person, place, and time.   Psychiatric:         Behavior: Behavior normal.       Procedures          ED Course      EKG as interpreted by me: Normal sinus rhythm with a rate of 76, no acute ischemia or infarction         Lab Results (last 24 hours)       Procedure Component  Value Units Date/Time    CBC & Differential [[id number]]  (Abnormal) Collected: 09/20/23 0849    Specimen: Blood Updated: 09/20/23 0913    Narrative:      The following orders were created for panel order CBC & Differential.  Procedure                               Abnormality         Status                     ---------                               -----------         ------                     CBC Auto Differential[[id number]]        Abnormal            Final result                 Please view results for these tests on the individual orders.    Comprehensive Metabolic Panel [[id number]]  (Abnormal) Collected: 09/20/23 0849    Specimen: Blood Updated: 09/20/23 0936     Glucose 106 mg/dL      BUN 13 mg/dL      Creatinine 0.61 mg/dL      Sodium 134 mmol/L      Potassium 4.0 mmol/L      Chloride 102 mmol/L      CO2 21.0 mmol/L      Calcium 8.5 mg/dL      Total Protein 7.1 g/dL      Albumin 4.0 g/dL      ALT (SGPT) 60 U/L      AST (SGOT) 51 U/L      Alkaline Phosphatase 108 U/L      Total Bilirubin 0.4 mg/dL      Globulin 3.1 gm/dL      A/G Ratio 1.3 g/dL      BUN/Creatinine Ratio 21.3     Anion Gap 11.0 mmol/L      eGFR 117.5 mL/min/1.73     Narrative:      GFR Normal >60  Chronic Kidney Disease <60  Kidney Failure <15      High Sensitivity Troponin T [[id number]]  (Normal) Collected: 09/20/23 0849    Specimen: Blood Updated: 09/20/23 0931     HS Troponin T <6 ng/L     Narrative:      High Sensitive Troponin T Reference Range:  <10.0 ng/L- Negative Female for AMI  <15.0 ng/L- Negative Male for AMI  >=10 - Abnormal Female indicating possible myocardial injury.  >=15 - Abnormal Male indicating possible myocardial injury.   Clinicians would have to utilize clinical acumen, EKG, Troponin, and serial changes to determine if it is an Acute Myocardial Infarction or myocardial injury due to an underlying chronic condition.         D-dimer, Quantitative [[id number]]  (Abnormal) Collected: 09/20/23 0849    Specimen: Blood Updated:  "09/20/23 0923     D-Dimer, Quantitative 1.73 MCGFEU/mL     Narrative:      According to the assay 's published package insert, a normal (<0.50 MCGFEU/mL) D-dimer result in conjunction with a non-high clinical probability assessment, excludes deep vein thrombosis (DVT) and pulmonary embolism (PE) with high sensitivity.    D-dimer values increase with age and this can make VTE exclusion of an older population difficult. To address this, the [employer], based on best available evidence and recent guidelines, recommends that clinicians use age-adjusted D-dimer thresholds in patients greater than 50 years of age with: a) a low probability of PE who do not meet all Pulmonary Embolism Rule Out Criteria, or b) in those with intermediate probability of PE.   The formula for an age-adjusted D-dimer cut-off is \"age/100\".  For example, a 60 year old patient would have an age-adjusted cut-off of 0.60 MCGFEU/mL and an 80 year old 0.80 MCGFEU/mL.    Lipase [[id number]]  (Normal) Collected: 09/20/23 0849    Specimen: Blood Updated: 09/20/23 0931     Lipase 15 U/L     CBC Auto Differential [[id number]]  (Abnormal) Collected: 09/20/23 0849    Specimen: Blood Updated: 09/20/23 0913     WBC 6.87 10*3/mm3      RBC 4.53 10*6/mm3      Hemoglobin 11.5 g/dL      Hematocrit 37.6 %      MCV 83.0 fL      MCH 25.4 pg      MCHC 30.6 g/dL      RDW 14.4 %      RDW-SD 43.2 fl      MPV 9.4 fL      Platelets 241 10*3/mm3      Neutrophil % 70.1 %      Lymphocyte % 21.4 %      Monocyte % 5.2 %      Eosinophil % 2.3 %      Basophil % 0.6 %      Immature Grans % 0.4 %      Neutrophils, Absolute 4.81 10*3/mm3      Lymphocytes, Absolute 1.47 10*3/mm3      Monocytes, Absolute 0.36 10*3/mm3      Eosinophils, Absolute 0.16 10*3/mm3      Basophils, Absolute 0.04 10*3/mm3      Immature Grans, Absolute 0.03 10*3/mm3      nRBC 0.0 /100 WBC     Urinalysis With Culture If Indicated - Urine, Clean Catch [[id number]]  (Normal) Collected: " 09/20/23 0901    Specimen: Urine, Clean Catch Updated: 09/20/23 0913     Color, UA Yellow     Appearance, UA Clear     pH, UA 6.0     Specific Gravity, UA 1.019     Glucose, UA Negative     Ketones, UA Negative     Bilirubin, UA Negative     Blood, UA Negative     Protein, UA Negative     Leuk Esterase, UA Negative     Nitrite, UA Negative     Urobilinogen, UA 0.2 E.U./dL    Narrative:      In absence of clinical symptoms, the presence of pyuria, bacteria, and/or nitrites on the urinalysis result does not correlate with infection.  Urine microscopic not indicated.    POC Urine Pregnancy [[id number]]  (Normal) Collected: 09/20/23 0910    Specimen: Urine Updated: 09/20/23 0911     HCG, Urine, QL Negative     Lot Number [id number]     Internal Positive Control Positive     Internal Negative Control Negative     Expiration Date 1,102,025    High Sensitivity Troponin T 2Hr [[id number]] Collected: 09/20/23 1034    Specimen: Blood Updated: 09/20/23 1112     HS Troponin T <6 ng/L      Troponin T Delta --     Comment: Unable to calculate.       Narrative:      High Sensitive Troponin T Reference Range:  <10.0 ng/L- Negative Female for AMI  <15.0 ng/L- Negative Male for AMI  >=10 - Abnormal Female indicating possible myocardial injury.  >=15 - Abnormal Male indicating possible myocardial injury.   Clinicians would have to utilize clinical acumen, EKG, Troponin, and serial changes to determine if it is an Acute Myocardial Infarction or myocardial injury due to an underlying chronic condition.                CT Angiogram Chest   Final Result   1.  Acute pulmonary embolus identified in the right upper, right middle   and right lower lobes. No pulmonary embolus identified on the left. The   central pulmonary arteries are nondilated. No CT evidence for right   heart strain.   2.  Lungs are clear and well expanded.       Findings and recommendations were discussed with PIA HORNE on   9/20/2023 11:01 AM CDT at 9/20/2023 11:01 AM  CDT.       This report was signed and finalized on 9/20/2023 11:02 AM CDT by Dr Mati Womack.          CT Abdomen Pelvis With Contrast   Final Result          No acute findings in the abdomen/pelvis.       Fat attenuating lesion within the myometrium has progressively increased   in size dating back to 3/15/2022. This is favored to represent a   lipoleiomyoma, however given significant increase in size recommend   OB/GYN consultation.       Hepatic steatosis.       This report was signed and finalized on 9/20/2023 11:19 AM CDT by Renzo Myers.          XR Chest 1 View   Final Result   Borderline cardiomegaly on this portable AP study. Diminished level of   inspiration with no acute pulmonary process.       This report was signed and finalized on 9/20/2023 9:39 AM CDT by Dr. Alisa Morales MD.                                           Medical Decision Making  Patient is a 37-year-old female with a history of GERD who presents to the ER with chest pain and shortness of breath.  Patient states she was walking yesterday around 5 to 6 PM when she developed midsternal chest pain.  She describes it as a tightness that radiates to her left shoulder.  Pain has been constant since onset but varies in intensity and is worse with ambulation.  She does have associated shortness of air.  Patient also complains of left upper quadrant abdominal pain that she has had intermittently for the last month.  Patient states the pain is worse with eating.  She denies any fever, nausea vomiting diarrhea, urinary changes, neurologic changes.  She denies a history of MI or pulmonary embolus.    Differential diagnosis: Acute coronary syndrome, musculoskeletal chest pain, pulmonary embolus    Patient was given aspirin, morphine and Zofran.  Labs showed a mildly elevated ALT and AST and an elevated D-dimer.  Chest x-ray showed borderline cardiomegaly with a diminished level of inspiration but no acute process.  CTA of the chest showed  acute pulmonary embolus identified in the right upper, right middle and right lower lobes.  There is no evidence of right heart strain.  Patient was given Lovenox.  CT scan of the abdomen pelvis showed no acute findings.  Patient did have some uterine fibroids and fatty liver.  Dr. Goodman with the hospitalist group was consulted and has admitted the patient to his service for further work-up and treatment.    Problems Addressed:  Multiple subsegmental pulmonary emboli without acute cor pulmonale: complicated acute illness or injury    Amount and/or Complexity of Data Reviewed  Labs: ordered. Decision-making details documented in ED Course.  Radiology: ordered. Decision-making details documented in ED Course.  ECG/medicine tests: ordered. Decision-making details documented in ED Course.  Discussion of management or test interpretation with external provider(s): Dr Goodman with the hospitalist group    Risk  OTC drugs.  Prescription drug management.  Decision regarding hospitalization.        Final diagnoses:   Multiple subsegmental pulmonary emboli without acute cor pulmonale       ED Disposition  ED Disposition       ED Disposition   Decision to Admit    Condition   --    Comment   Level of Care: Telemetry [5]   Diagnosis: Pulmonary emboli [363138]   Admitting Physician: SAW GOODMAN [[id number]]   Attending Physician: SAW GOODMAN [[id number]]   Certification: I Certify That Inpatient Hospital Services Are Medically Necessary For Greater Than 2 Midnights                 No follow-up provider specified.       Medication List      No changes were made to your prescriptions during this visit.            Fiorella Sandoval MD  09/20/23 1142      Electronically signed by Fiorella Sandoval MD at 09/20/23 1142       Facility-Administered Medications as of 9/21/2023   Medication Dose Route Frequency Provider Last Rate Last Admin    acetaminophen (TYLENOL) tablet 650 mg  650 mg Oral Q4H PRN Saw Goodman MD   650  mg at 09/21/23 0426    Or    acetaminophen (TYLENOL) 160 MG/5ML oral solution 650 mg  650 mg Oral Q4H PRN Jaqui East MD        Or    acetaminophen (TYLENOL) suppository 650 mg  650 mg Rectal Q4H PRN Jaqui East MD        albuterol (PROVENTIL) nebulizer solution 0.083% 2.5 mg/3mL  2.5 mg Nebulization Q6H PRN Jaqui East MD        apixaban (ELIQUIS) tablet 10 mg  10 mg Oral Q12H Jaqui East MD   10 mg at 09/20/23 2157    Followed by    [START ON 9/27/2023] apixaban (ELIQUIS) tablet 5 mg  5 mg Oral Q12H Jaqui East MD        [COMPLETED] aspirin chewable tablet 324 mg  324 mg Oral Once RainsFiorella MD   162 mg at 09/20/23 0851    sennosides-docusate (PERICOLACE) 8.6-50 MG per tablet 2 tablet  2 tablet Oral BID Jaqui East MD   2 tablet at 09/20/23 2157    And    polyethylene glycol (MIRALAX) packet 17 g  17 g Oral Daily PRN Jaqui East MD        And    bisacodyl (DULCOLAX) EC tablet 5 mg  5 mg Oral Daily PRN Jaqui East MD        And    bisacodyl (DULCOLAX) suppository 10 mg  10 mg Rectal Daily PRN Jaqui East MD        citalopram (CeleXA) tablet 20 mg  20 mg Oral Daily Jaqui East MD        [COMPLETED] Enoxaparin Sodium (LOVENOX) syringe 150 mg  1 mg/kg Subcutaneous Once RainsFiorella MD   150 mg at 09/20/23 1140    [COMPLETED] HYDROmorphone (DILAUDID) injection 1 mg  1 mg Intravenous Once [place] DunesFiorella MD   1 mg at 09/20/23 0955    [COMPLETED] iopamidol (ISOVUE-370) 76 % injection 100 mL  100 mL Intravenous Once in imaging Fiorella Sandoval MD   100 mL at 09/20/23 1050    LORazepam (ATIVAN) tablet 0.5 mg  0.5 mg Oral Q8H PRN Jaqui East MD        melatonin tablet 5 mg  5 mg Oral Nightly PRN Jaqui East MD        [COMPLETED] morphine injection 4 mg  4 mg Intravenous Once [place], Fiorella HOGUE MD   4 mg at 09/20/23 0853    Morphine sulfate (PF) injection 1 mg  1 mg Intravenous Q4H PRN Jaqui East MD   1 mg  at 09/20/23 2210    And    naloxone (NARCAN) injection 0.4 mg  0.4 mg Intravenous Q5 Min PRN Jaqui East MD        multivitamin with minerals 1 tablet  1 tablet Oral Daily Jaqui East MD        nitroglycerin (NITROSTAT) SL tablet 0.4 mg  0.4 mg Sublingual Q5 Min PRN Jaqui East MD        [COMPLETED] ondansetron (ZOFRAN) injection 4 mg  4 mg Intravenous Once Fiorella Sandoval MD   4 mg at 09/20/23 0852    ondansetron (ZOFRAN) tablet 4 mg  4 mg Oral Q6H PRN Jaqui East MD        Or    ondansetron (ZOFRAN) injection 4 mg  4 mg Intravenous Q6H PRN Jaqui East MD        pantoprazole (PROTONIX) EC tablet 40 mg  40 mg Oral Q AM Jaqui East MD   40 mg at 09/21/23 0640    [COMPLETED] perflutren (DEFINITY) lipid microspheres injection 6.52 mg  1 mL Intravenous Once Jaqui East MD   6.52 mg at 09/20/23 1603    sodium chloride 0.9 % flush 10 mL  10 mL Intravenous PRN Jaqui East MD        sodium chloride 0.9 % flush 10 mL  10 mL Intravenous PRN Jaqui East MD        sodium chloride 0.9 % flush 10 mL  10 mL Intravenous Q12H Jaqui East MD   10 mL at 09/20/23 2212    sodium chloride 0.9 % flush 10 mL  10 mL Intravenous PRN Jaqui East MD        sodium chloride 0.9 % infusion 40 mL  40 mL Intravenous PRN Jaqui East MD         Orders (all)        Start     Ordered    09/27/23 2100  apixaban (ELIQUIS) tablet 5 mg  Every 12 Hours Scheduled        See Hyperspace for full Linked Orders Report.    09/20/23 1432    09/27/23 0000  apixaban (ELIQUIS) tablet 5 mg  Every 12 Hours Scheduled,   Status:  Discontinued        See Hyperspace for full Linked Orders Report.    09/20/23 1431    09/21/23 0600  pantoprazole (PROTONIX) EC tablet 40 mg  Every Early Morning         09/20/23 1430    09/21/23 0600  Basic Metabolic Panel  Daily       09/20/23 1430    09/21/23 0600  CBC & Differential  Daily       09/20/23 1430    09/21/23 0600  CBC Auto  Differential  PROCEDURE ONCE         09/20/23 2202 09/20/23 2213  Initiate & Follow Hypercapnic Monitoring Guideline for Opioid Administration via EtCO2 and / or SpO2  Continuous        Comments: Follow Hypercapnic Monitoring Guideline As Outlined in Process Instructions (Open Order Report to View Full Instructions)    09/20/23 2212 09/20/23 2213  Opioid Administration - Document EtCO2 and / or SpO2 With Each Set of Vitals & Any Change in Patient Status  Per Order Details        Comments: With Each Set of Vitals & Any Change in Patient Status    09/20/23 2212 09/20/23 2213  Opioid Administration - Notify Provider Hypercapnic Monitoring  Until Discontinued         09/20/23 2212 09/20/23 2213  Opioid Administration - Continuous Pulse Oximetry (SpO2)  Continuous         09/20/23 2212 09/20/23 2100  sennosides-docusate (PERICOLACE) 8.6-50 MG per tablet 2 tablet  2 Times Daily        See Hyperspace for full Linked Orders Report.    09/20/23 1430    09/20/23 2100  apixaban (ELIQUIS) tablet 10 mg  Every 12 Hours Scheduled        See Hyperspace for full Linked Orders Report.    09/20/23 1432    09/20/23 1800  citalopram (CeleXA) tablet 20 mg  Daily         09/20/23 1430    09/20/23 1800  Oral Care  2 Times Daily       09/20/23 1430    09/20/23 1800  Incentive Spirometry  Every 4 Hours While Awake       09/20/23 1430    09/20/23 1618  perflutren (DEFINITY) lipid microspheres injection 6.52 mg  Once         09/20/23 1602    09/20/23 1600  Vital Signs  Every 4 Hours       09/20/23 1430    09/20/23 1504  Hemoglobin A1c  Once         09/20/23 1503    09/20/23 1504  Lipid Panel  Once         09/20/23 1503    09/20/23 1447  apixaban (ELIQUIS) tablet 10 mg  Every 12 Hours Scheduled,   Status:  Discontinued        See Hyperspace for full Linked Orders Report.    09/20/23 1431    09/20/23 1446  multivitamin with minerals 1 tablet  Daily         09/20/23 1430    09/20/23 1446  sodium chloride 0.9 % flush 10 mL  Every 12  Hours Scheduled         09/20/23 1430    09/20/23 1435  Factor 5 Leiden  Once         09/20/23 1435    09/20/23 1435  Homocysteine  Once         09/20/23 1435    09/20/23 1435  Lupus Anticoagulant  Once         09/20/23 1435    09/20/23 1435  Protein S Antigen, Total  Once         09/20/23 1435    09/20/23 1435  Protein S Antigen, Free  Once         09/20/23 1435    09/20/23 1435  Protein S Functional  Once         09/20/23 1435    09/20/23 1435  Protein C Antigen, Total  Once         09/20/23 1435    09/20/23 1435  Protein C-Functional  Once         09/20/23 1435    09/20/23 1433  PT Consult: Eval & Treat Functional Mobility Below Baseline  Once         09/20/23 1432    09/20/23 1431  Intake & Output  Every Shift       09/20/23 1430    09/20/23 1431  Weigh Patient  Once         09/20/23 1430    09/20/23 1431  Insert Peripheral IV  Once         09/20/23 1430    09/20/23 1431  Saline Lock & Maintain IV Access  Continuous,   Status:  Canceled         09/20/23 1430    09/20/23 1431  VTE Prophylaxis Not Indicated: Other: Patient Currently Anticoagulated / Receiving Prophylaxis  Once         09/20/23 1430    09/20/23 1431  Continuous Cardiac Monitoring  Continuous        Comments: Follow Standing Orders As Outlined in Process Instructions (Open Order Report to View Full Instructions)    09/20/23 1430    09/20/23 1431  Telemetry - Maintain IV Access  Continuous         09/20/23 1430    09/20/23 1431  Telemetry - Place Orders & Notify Provider of Results When Patient Experiences Acute Chest Pain, Dysrhythmia or Respiratory Distress  Until Discontinued         09/20/23 1430    09/20/23 1431  May Be Off Telemetry for Tests  Continuous         09/20/23 1430    09/20/23 1431  Pulse Oximetry, Continuous  Continuous,   Status:  Canceled         09/20/23 1430    09/20/23 1431  Activity - Ad Roberta  Until Discontinued         09/20/23 1430    09/20/23 1431  Diet: Cardiac Diets; Healthy Heart (2-3 Na+); Texture: Regular Texture (IDDSI  7); Fluid Consistency: Thin (IDDSI 0)  Diet Effective Now         09/20/23 1430    09/20/23 1431  Adult Transthoracic Echo Complete w/ Color, Spectral and Contrast if necessary per protocol  Once         09/20/23 1430 09/20/23 1431  US Venous Doppler Lower Extremity Bilateral (duplex)  1 Time Imaging         09/20/23 1430    09/20/23 1430  albuterol (PROVENTIL) nebulizer solution 0.083% 2.5 mg/3mL  Every 6 Hours PRN         09/20/23 1430 09/20/23 1430  sodium chloride 0.9 % flush 10 mL  As Needed         09/20/23 1430    09/20/23 1430  sodium chloride 0.9 % infusion 40 mL  As Needed         09/20/23 1430 09/20/23 1430  polyethylene glycol (MIRALAX) packet 17 g  Daily PRN        See Hyperspace for full Linked Orders Report.    09/20/23 1430 09/20/23 1430  bisacodyl (DULCOLAX) EC tablet 5 mg  Daily PRN        See Hyperspace for full Linked Orders Report.    09/20/23 1430    09/20/23 1430  bisacodyl (DULCOLAX) suppository 10 mg  Daily PRN        See Hyperspace for full Linked Orders Report.    09/20/23 1430    09/20/23 1430  nitroglycerin (NITROSTAT) SL tablet 0.4 mg  Every 5 Minutes PRN         09/20/23 1430    09/20/23 1430  Oxygen Therapy- Nasal Cannula; Titrate 1-6 LPM Per SpO2; 90 - 95%  Continuous PRN,   Status:  Canceled       09/20/23 1430    09/20/23 1430  acetaminophen (TYLENOL) tablet 650 mg  Every 4 Hours PRN        See Hyperspace for full Linked Orders Report.    09/20/23 1430    09/20/23 1430  acetaminophen (TYLENOL) 160 MG/5ML oral solution 650 mg  Every 4 Hours PRN        See Hyperspace for full Linked Orders Report.    09/20/23 1430    09/20/23 1430  acetaminophen (TYLENOL) suppository 650 mg  Every 4 Hours PRN        See Hyperspace for full Linked Orders Report.    09/20/23 1430    09/20/23 1430  Morphine sulfate (PF) injection 1 mg  Every 4 Hours PRN        See Hyperspace for full Linked Orders Report.    09/20/23 1430    09/20/23 1430  naloxone (NARCAN) injection 0.4 mg  Every 5 Minutes  PRN        See Hyperspace for full Linked Orders Report.    09/20/23 1430    09/20/23 1430  LORazepam (ATIVAN) tablet 0.5 mg  Every 8 Hours PRN         09/20/23 1430    09/20/23 1430  melatonin tablet 5 mg  Nightly PRN         09/20/23 1430    09/20/23 1430  ondansetron (ZOFRAN) tablet 4 mg  Every 6 Hours PRN        See Hyperspace for full Linked Orders Report.    09/20/23 1430    09/20/23 1430  ondansetron (ZOFRAN) injection 4 mg  Every 6 Hours PRN        See Hyperspace for full Linked Orders Report.    09/20/23 1430    09/20/23 1426  Code Status and Medical Interventions:  Continuous         09/20/23 1430    09/20/23 1148  Initiate Observation Status  Once         09/20/23 1148    09/20/23 1140  Inpatient Admission  Once,   Status:  Canceled         09/20/23 1139    09/20/23 1117  Enoxaparin Sodium (LOVENOX) syringe 150 mg  Once         09/20/23 1101    09/20/23 1105  iopamidol (ISOVUE-370) 76 % injection 100 mL  Once in Imaging         09/20/23 1049    09/20/23 1049  High Sensitivity Troponin T 2Hr  PROCEDURE ONCE         09/20/23 0931    09/20/23 1006  HYDROmorphone (DILAUDID) injection 1 mg  Once         09/20/23 0950    09/20/23 0946  CT Angiogram Chest  1 Time Imaging         09/20/23 0945    09/20/23 0946  CT Abdomen Pelvis With Contrast  1 Time Imaging        Comments: IV CONTRAST ONLY      09/20/23 0945    09/20/23 0852  aspirin chewable tablet 324 mg  Once         09/20/23 0836    09/20/23 0852  morphine injection 4 mg  Once         09/20/23 0836    09/20/23 0852  ondansetron (ZOFRAN) injection 4 mg  Once         09/20/23 0836    09/20/23 0836  Cardiac Monitoring  Continuous,   Status:  Canceled        Comments: Follow Standing Orders As Outlined in Process Instructions (Open Order Report to View Full Instructions)    09/20/23 0836    09/20/23 0836  Continuous Pulse Oximetry, Add Qxygen if SaO2 is Below 90%  Continuous,   Status:  Canceled        Comments: Add Oxygen if SaO2 is Below 90%.    09/20/23 0876     09/20/23 0836  Vital Signs  Per Hospital Policy         09/20/23 0836    09/20/23 0836  ECG 12 Lead Chest Pain  Now & in 2 Hours       09/20/23 0836    09/20/23 0836  XR Chest 1 View  1 Time Imaging         09/20/23 0836    09/20/23 0836  Insert Peripheral IV  Once         09/20/23 0836    09/20/23 0836  Spivey Draw  Once         09/20/23 0836    09/20/23 0836  CBC & Differential  Once         09/20/23 0836    09/20/23 0836  Comprehensive Metabolic Panel  Once         09/20/23 0836    09/20/23 0836  High Sensitivity Troponin T  Once         09/20/23 0836    09/20/23 0836  D-dimer, Quantitative  Once         09/20/23 0836    09/20/23 0836  Lipase  Once         09/20/23 0836    09/20/23 0836  POC Urine Pregnancy  STAT         09/20/23 0836    09/20/23 0836  Urinalysis With Culture If Indicated - Urine, Clean Catch  Once         09/20/23 0836    09/20/23 0836  Insert Peripheral IV  Once        See Hyperspace for full Linked Orders Report.    09/20/23 0836    09/20/23 0836  Green Top (Gel)  PROCEDURE ONCE         09/20/23 0836    09/20/23 0836  Lavender Top  PROCEDURE ONCE         09/20/23 0836    09/20/23 0836  Red Top  PROCEDURE ONCE         09/20/23 0836    09/20/23 0836  Light Blue Top  PROCEDURE ONCE         09/20/23 0836    09/20/23 0836  CBC Auto Differential  PROCEDURE ONCE         09/20/23 0836    09/20/23 0835  sodium chloride 0.9 % flush 10 mL  As Needed        See Hyperspace for full Linked Orders Report.    09/20/23 0836    09/20/23 0835  sodium chloride 0.9 % flush 10 mL  As Needed         09/20/23 0836    09/20/23 0823  ECG 12 Lead Chest Pain  STAT         09/20/23 0822    Unscheduled  Oxygen Therapy- Nasal Cannula; Titrate 1-6 LPM Per SpO2; 90 - 95%  Continuous PRN       09/20/23 0836    Unscheduled  ECG 12 Lead Chest Pain  As Needed      Comments: Q15 minutes x 4 for first hour after persistent or recurrent chest pain    09/20/23 0836    Unscheduled  ECG 12 Lead Chest Pain  As Needed      Comments:  Persistent, unrelieved or recurrent chest pain after the first hour of treatment    09/20/23 0836    Unscheduled  Patient May Use Home CPAP / BIPAP For Sleep or As Needed  As Needed       09/20/23 1504    --  citalopram (CeleXA) 10 MG tablet  Daily         09/20/23 1440    --  ibuprofen (ADVIL,MOTRIN) 200 MG tablet  Every 6 Hours PRN         09/20/23 1441    --  SCANNED - TELEMETRY           09/20/23 0000    --  SCANNED EKG         09/20/23 0000                  Physician Progress Notes (last 72 hours)  Notes from 09/18/23 0642 through 09/21/23 0642   No notes of this type exist for this encounter.       Consult Notes (last 72 hours)  Notes from 09/18/23 0642 through 09/21/23 0642   No notes of this type exist for this encounter.

## 2023-09-21 NOTE — PLAN OF CARE
Goal Outcome Evaluation:  Plan of Care Reviewed With: patient        Progress: no change  Outcome Evaluation: Patient c/o pain. See Mar. Patient on Room air. O2 sat %. up ad gabriel. Voiding.

## 2023-09-22 LAB
F5 GENE MUT ANL BLD/T: NORMAL
PROT S ACT/NOR PPP: 104 % (ref 63–140)
PROT S AG ACT/NOR PPP IA: 101 % (ref 60–150)
PROT S FREE AG ACT/NOR PPP IA: 126 % (ref 61–136)

## 2023-09-22 NOTE — PAYOR COMM NOTE
"[facility]  FAX  [phone]    Grant Cordon (37 y.o. Female)       Date of Birth   1985    Social Security Number       Address   [address] [place], [place] [address]    Home Phone   [phone]    MRN   [id number]       Islam   Judaism    Marital Status   Single                            Admission Date   9/20/23    Admission Type   Emergency    Admitting Provider   Jaqui East MD    Attending Provider       Department, Room/Bed   [facility] 4B, 456/1       Discharge Date   9/21/2023    Discharge Disposition   Home or Self Care    Discharge Destination                                 Attending Provider: (none)   Allergies: No Known Allergies    Isolation: None   Infection: None   Code Status: Prior    Ht: 152.4 cm (60\")   Wt: 145 kg (320 lb)    Admission Cmt: None   Principal Problem: Pulmonary emboli [I26.99]                   Active Insurance as of 9/20/2023       Primary Coverage       Payor Plan Insurance Group Employer/Plan Group    [facility] [id number]       Payor Plan Address Payor Plan Phone Number Payor Plan Fax Number Effective Dates    [address]   1/1/2017 - None Entered    [facility] [id number]         Subscriber Name Subscriber Birth Date Member ID       GRANT CORDON 1985 [id number]                     Emergency Contacts        (Rel.) Home Phone Work Phone Mobile Phone    Rao Banerjee (Brother) -- -- [phone]                 Discharge Summary        Jaqui East MD at 09/21/23 1011                [facility]  DISCHARGE SUMMARY       Date of Admission: 9/20/2023  Date of Discharge:  9/21/2023  Primary Care Physician: Douglas Carnes MD    Presenting Problem/History of Present Illness:  Adapted from H&P from 9/20/23  Patient is a 37-year-old female with history of anxiety, morbid obesity and current who presents to the ER due " to worsening shortness of breath.  Patient reports symptom onset was 3 days ago.  ER work-up was positive for an acute pulmonary embolus in the right upper, middle and lower lobe, with no evidence of a right heart strain.  Patient was started on treatment dose anticoagulation Lovenox.  Patient denies recent travel, tobacco use, illicit drug use, birth control use, or family history of blood clots.  Patient reports some occasional pains in the feet and shin.      Final Discharge Diagnoses:  Active Hospital Problems    Diagnosis     **Pulmonary emboli     Essential hypertension     Class 3 severe obesity due to excess calories with serious comorbidity and body mass index (BMI) of 60.0 to 69.9 in adult     Prediabetes     Obstructive sleep apnea        Consults: none     Procedures Performed: none     Pertinent Test Results:   Results for orders placed during the hospital encounter of 09/20/23    Adult Transthoracic Echo Complete w/ Color, Spectral and Contrast if necessary per protocol    Interpretation Summary    Left ventricular systolic function is normal. Left ventricular ejection fraction appears to be 61 - 65%.    Left ventricular wall thickness is consistent with mild to moderate concentric hypertrophy.    Normal right ventricular cavity size and systolic function noted.    No evidence of a patent foramen ovale.    No significant valvular abnormalities identified on this study.      Imaging Results (All)       Procedure Component Value Units Date/Time    US Venous Doppler Lower Extremity Bilateral (duplex) [[id number]] Resulted: 09/20/23 1503     Updated: 09/20/23 1623    CT Abdomen Pelvis With Contrast [[id number]] Collected: 09/20/23 1111     Updated: 09/20/23 1122    Narrative:      EXAM: CT ABDOMEN PELVIS W CONTRAST-     INDICATION: Abdominal pain, acute, nonlocalized        TECHNIQUE: Helically acquired CT images were obtained of the abdomen and  pelvis after the administration of intravenous contrast.  Coronal and  sagittal reformations were performed.        CONTRAST:  Isovue-300      DOSE LENGTH PRODUCT: 1722 mGy cm. Automated exposure control was also  utilized to decrease patient radiation dose.     COMPARISON: 3/1/2023     FINDINGS:     Lower Chest: Unremarkable.     Liver: Hepatic steatosis.     Biliary Tree: Status post cholecystectomy.     Spleen: Unremarkable.     Pancreas: Unremarkable.     Adrenal Glands: Unremarkable.     Kidneys/Ureters/Bladder: Subcentimeter low-density lesions in the  bilateral kidneys are too small to characterize but likely unchanged,  most likely representing a small cyst.     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 This has progressively  increased in size dating back to 3/15/2022. This currently measures up  to 12.7 x 5.2 cm, 2.8 x 1.8 cm on 3/15/2022.     Gastrointestinal Tract: Suture line in the sigmoid colon. Colonic  diverticulosis. See below findings.     Lymphatics: Unremarkable.     Vasculature: Unremarkable.      Peritoneum/Retroperitoneum: Unremarkable.     Abdominal Wall/Soft Tissues: Rectus diastases with a fat and small bowel  containing hernia without evidence of obstruction.     Osseous Structures: Unremarkable.       Impression:            No acute findings in the abdomen/pelvis.     Fat attenuating lesion within the myometrium has progressively increased  in size dating back to 3/15/2022. This is favored to represent a  lipoleiomyoma, however given significant increase in size recommend  OB/GYN consultation.     Hepatic steatosis.     This report was signed and finalized on 9/20/2023 11:19 AM CDT by Renzo Myers.       CT Angiogram Chest [[id number]] Collected: 09/20/23 1057     Updated: 09/20/23 1105    Narrative:      CT ANGIOGRAM CHEST- 9/20/2023 10:41 AM CDT      HISTORY: Pulmonary embolism (PE) suspected, unknown D-dimer      COMPARISON: CT scan dated 9/29/2021     DOSE LENGTH PRODUCT: 245  mGy cm. Automated exposure control was also  utilized to decrease patient radiation dose.     TECHNIQUE: Helical tomographic images of the chest were obtained after  the administration of intravenous contrast following angiogram protocol.  Additionally, 3D and multiplanar reformatted images were provided.       FINDINGS:    Pulmonary arteries: There is adequate enhancement of the pulmonary  arteries to evaluate for central and segmental pulmonary emboli. Acute  pulmonary embolus identified in the right lower lobe pulmonary artery  extending down into the segmental pulmonary arteries. There is also  embolus in the right middle lobe pulmonary artery which extends into the  medial segment pulmonary artery. There is segmental and subsegmental  embolus identified in the right upper lobe pulmonary arteries. No  pulmonary embolus identified on the left. Central pulmonary arteries are  nondilated.     AORTA AND GREAT VESSELS: Thoracic aorta is normal in caliber. No  dissection identified. No flow-limiting stenosis identified at the great  vessel origins.     VISUALIZED NECK BASE: The imaged portion of the base of the neck appears  unremarkable.     LUNGS: The lungs are clear. There is no mass, worrisome nodule, or  consolidation. No pleural effusion is seen. Airways are clear.     HEART: The heart is normal in size. There is no pericardial effusion.     MEDIASTINUM AND LYMPH NODEs: No suspicious hilar or mediastinal  adenopathy..     SKELETAL AND SOFT TISSUES: Chest wall soft tissues are unremarkable. No  acute bony abnormality. Thoracic spine degenerative change.     UPPER ABDOMEN: The imaged portion of the upper abdomen demonstrates no  acute process.       Impression:      1.  Acute pulmonary embolus identified in the right upper, right middle  and right lower lobes. No pulmonary embolus identified on the left. The  central pulmonary arteries are nondilated. No CT evidence for right  heart strain.  2.  Lungs are clear  and well expanded.     Findings and recommendations were discussed with PIA HORNE on  9/20/2023 11:01 AM CDT at 9/20/2023 11:01 AM CDT.     This report was signed and finalized on 9/20/2023 11:02 AM CDT by Dr Mati Womack.       XR Chest 1 View [[id number]] Collected: 09/20/23 0938     Updated: 09/20/23 0942    Narrative:      XR CHEST 1 VW-     HISTORY: Chest Pain Protocol     COMPARISON: 9/29/2021     FINDINGS: Frontal view of the chest obtained.     Slightly diminished level of inspiration. Lungs are free of  consolidation, collapse, or edema. The heart appears borderline  enlarged. No radiographic evidence of edema. No pleural effusion or  pneumothorax. No acute regional bony pathology.          Impression:      Borderline cardiomegaly on this portable AP study. Diminished level of  inspiration with no acute pulmonary process.     This report was signed and finalized on 9/20/2023 9:39 AM CDT by Dr. Alisa Morales MD.             LAB RESULTS:      Lab 09/21/23  0418 09/20/23  0849   WBC 6.26 6.87   HEMOGLOBIN 10.8* 11.5*   HEMATOCRIT 35.7 37.6   PLATELETS 239 241   NEUTROS ABS 3.92 4.81   IMMATURE GRANS (ABS) 0.03 0.03   LYMPHS ABS 1.58 1.47   MONOS ABS 0.43 0.36   EOS ABS 0.25 0.16   MCV 83.8 83.0   D DIMER QUANT  --  1.73*         Lab 09/21/23  0418 09/20/23  1717 09/20/23  0849   SODIUM 138  --  134*   POTASSIUM 3.9  --  4.0   CHLORIDE 105  --  102   CO2 23.0  --  21.0*   ANION GAP 10.0  --  11.0   BUN 13  --  13   CREATININE 0.53*  --  0.61   EGFR 121.6  --  117.5   GLUCOSE 95  --  106*   CALCIUM 9.0  --  8.5*   HEMOGLOBIN A1C  --  5.70*  --          Lab 09/20/23  0849   TOTAL PROTEIN 7.1   ALBUMIN 4.0   GLOBULIN 3.1   ALT (SGPT) 60*   AST (SGOT) 51*   BILIRUBIN 0.4   ALK PHOS 108   LIPASE 15         Lab 09/20/23  1034 09/20/23  0849   HSTROP T <6 <6         Lab 09/20/23  1034   CHOLESTEROL 194   LDL CHOL 130*   HDL CHOL 32*   TRIGLYCERIDES 179*             Brief Urine Lab Results  (Last result in  "the past 365 days)        Color   Clarity   Blood   Leuk Est   Nitrite   Protein   CREAT   Urine HCG        09/20/23 0910               Negative             Microbiology Results (last 10 days)       ** No results found for the last 240 hours. **            Hospital Course: Patient is a 37-year-old female with history of anxiety, morbid obesity who was admitted for an acute pulmonary embolism without a right heart strain.  Patient was started on full anticoagulation with treatment dose Lovenox.  Hypercoagulability testing were obtained and are pending at the moment.  Patient was transitioned to DOAC for PE therapy.  Echocardiogram and venous Doppler ultrasound were obtained.  Echo showed EF of 61 to 65%.  With left ventricular wall thickness which is consistent with mild to moderate concentric hypertrophy.  Blood pressure was elevated during hospital stay.  Patient was started on losartan.  Had an extensive discussion with patient about lifestyle modification to help with weight loss.  Patient advised to follow-up with PCP and discuss all her options.  I suspect this is likely provoked event since patient job requires her to sit all day.  Referral to hematology was placed.  Hypercoagulability studies and venous Doppler ultrasound of lower extremities are still pending.      Physical Exam on Discharge:  /67 (BP Location: Left arm, Patient Position: Lying)   Pulse 84   Temp 97.9 °F (36.6 °C) (Oral)   Resp 18   Ht 152.4 cm (60\")   Wt (!) 145 kg (320 lb)   LMP 09/06/2023   SpO2 96%   BMI 62.50 kg/m²   Physical Exam  Vitals and nursing note reviewed.   Constitutional:       General: She is not in acute distress.     Appearance: She is morbidly obese. She is not diaphoretic.   HENT:      Head: Normocephalic and atraumatic.      Right Ear: External ear normal.      Left Ear: External ear normal.   Cardiovascular:      Rate and Rhythm: Normal rate and regular rhythm.      Heart sounds: Normal heart sounds. "   Pulmonary:      Effort: Pulmonary effort is normal. No respiratory distress.      Breath sounds: Normal breath sounds.   Chest:      Chest wall: No tenderness.   Abdominal:      General: Bowel sounds are normal.      Palpations: Abdomen is soft.      Tenderness: There is no abdominal tenderness.   Musculoskeletal:         General: No swelling or tenderness.      Cervical back: Normal range of motion and neck supple.      Right lower leg: No edema.      Left lower leg: No edema.   Skin:     General: Skin is warm and dry.      Capillary Refill: Capillary refill takes less than 2 seconds.      Findings: No erythema or rash.   Neurological:      General: No focal deficit present.      Mental Status: She is alert and oriented to person, place, and time.      Motor: No weakness.   Psychiatric:         Behavior: Behavior normal.         Condition on Discharge: stable and improving     Discharge Disposition:  Home or Self Care    Discharge Medications:     Discharge Medications        New Medications        Instructions Start Date   acetaminophen 325 MG tablet  Commonly known as: TYLENOL   650 mg, Oral, Every 6 Hours PRN      apixaban 5 MG tablet tablet  Commonly known as: ELIQUIS   10 mg, Oral, Every 12 Hours Scheduled      apixaban 5 MG tablet tablet  Commonly known as: ELIQUIS   5 mg, Oral, Every 12 Hours Scheduled   Start Date: September 27, 2023     losartan 50 MG tablet  Commonly known as: COZAAR   50 mg, Oral, Every 24 Hours Scheduled      rosuvastatin 20 MG tablet  Commonly known as: CRESTOR   20 mg, Oral, Nightly             Continue These Medications        Instructions Start Date   busPIRone 15 MG tablet  Commonly known as: BUSPAR   15 mg, Oral, 3 Times Daily      citalopram 10 MG tablet  Commonly known as: CeleXA   10 mg, Oral, Daily             Stop These Medications      ibuprofen 200 MG tablet  Commonly known as: ADVIL,MOTRIN                Discharge Diet:   Diet Instructions       Diet: Cardiac Diets;  Healthy Heart (2-3 Na+); Thin (IDDSI 0)      Discharge Diet: Cardiac Diets    Cardiac Diet: Healthy Heart (2-3 Na+)    Fluid Consistency: Thin (IDDSI 0)            Activity at Discharge:   Activity Instructions       Activity as Tolerated              Follow-up Appointments:   Future Appointments   Date Time Provider Department Center   3/13/2024  9:00 AM Lisbet Vargas APRN [facility]       Test Results Pending at Discharge:  Hypercoagulability studies and venous Doppler ultrasound of lower extremities    Electronically signed by Saw Goodman MD, 09/21/23, 10:29 CDT.    Time: 32 minutes.          Electronically signed by Saw Goodman MD at 09/21/23 1029       Discharge Order (From admission, onward)       Start     Ordered    09/21/23 1014  Discharge patient  Once        Expected Discharge Date: 09/21/23   Discharge Disposition: Home or Self Care   Physician of Record for Attribution - Please select from Treatment Team: SAW GOODMAN [[id number]]   Review needed by CMO to determine Physician of Record: No      Question Answer Comment   Physician of Record for Attribution - Please select from Treatment Team SAW GOODMAN    Review needed by CMO to determine Physician of Record No        09/21/23 102

## 2023-09-22 NOTE — PAYOR COMM NOTE
"[facility]  FAX  [phone]'    Grant Cordon (37 y.o. Female)       Date of Birth   1985    Social Security Number       Address   [address] [place], [place] [address]    Home Phone   [phone]    MRN   [id number]       Yarsani   Amish    Marital Status   Single                            Admission Date   9/20/23    Admission Type   Emergency    Admitting Provider   Jaqui East MD    Attending Provider       Department, Room/Bed   [facility] 4B, 456/1       Discharge Date   9/21/2023    Discharge Disposition   Home or Self Care    Discharge Destination                                 Attending Provider: (none)   Allergies: No Known Allergies    Isolation: None   Infection: None   Code Status: Prior    Ht: 152.4 cm (60\")   Wt: 145 kg (320 lb)    Admission Cmt: None   Principal Problem: Pulmonary emboli [I26.99]                   Active Insurance as of 9/20/2023       Primary Coverage       Payor Plan Insurance Group Employer/Plan Group    [facility] [id number]       Payor Plan Address Payor Plan Phone Number Payor Plan Fax Number Effective Dates    [address]   1/1/2017 - None Entered    [facility] [id number]         Subscriber Name Subscriber Birth Date Member ID       GRANT CORDON 1985 [id number]                     Emergency Contacts        (Rel.) Home Phone Work Phone Mobile Phone    Rao Banerjee (Brother) -- -- [phone]                 History & Physical        Jaqui East MD at 09/20/23 [address] [place] [address] Medicine Services  HISTORY AND PHYSICAL    Date of Admission: 9/20/2023  Primary Care Physician: Douglas Carnes MD    Subjective   Primary Historian: Patient     Chief Complaint: SOB     History of Present Illness  Patient is a 37-year-old female with history of anxiety, morbid obesity and current who presents to the ER due to worsening " shortness of breath.  Patient reports symptom onset was 3 days ago.  ER work-up was positive for an acute pulmonary embolus in the right upper, middle and lower lobe, with no evidence of a right heart strain.  Patient was started on treatment dose anticoagulation Lovenox.  Patient denies recent travel, tobacco use, illicit drug use, birth control use, or family history of blood clots.  Patient reports some occasional pains in the feet and shin.         Review of Systems   Respiratory:  Negative for shortness of breath.     Otherwise complete ROS reviewed and negative except as mentioned in the HPI.    Past Medical History:   Past Medical History:   Diagnosis Date    Abdominal hernia     Anemia     Anxiety     Diverticulitis     Fibroid     GERD (gastroesophageal reflux disease)     History of swelling of feet     PMS (premenstrual syndrome)      Past Surgical History:  Past Surgical History:   Procedure Laterality Date    ADENOIDECTOMY      APPENDECTOMY      CHOLECYSTECTOMY      open    HERNIA REPAIR      x3 living tissue    LAPAROSCOPIC CHOLECYSTECTOMY      LAPAROSCOPIC COLON RESECTION      LYMPH NODE DISSECTION      TONSILLECTOMY       Social History:  reports that she has never smoked. She has never used smokeless tobacco. She reports current alcohol use of about 2.0 standard drinks per week. She reports that she does not use drugs.    Family History: family history includes Aneurysm in her paternal grandmother; Breast cancer in her paternal aunt; COPD in her father; Cancer in her maternal grandfather; Colon cancer in her paternal aunt and paternal aunt; Coronary artery disease in her father; Crohn's disease in her brother; Emphysema in her father; Heart attack in her paternal grandfather; Heart disease in her paternal grandfather; Hypereosinophilic syndrome in her brother; Hyperlipidemia in her father; Hypertension in her father and maternal grandmother; Hypothyroidism in her father and mother; Lupus in her  mother; Osteoporosis in her mother; Rheum arthritis in her brother and mother; Stroke in her maternal grandmother.       Allergies:  No Known Allergies    Medications:  Prior to Admission medications    Medication Sig Start Date End Date Taking? Authorizing Provider   busPIRone (BUSPAR) 15 MG tablet Take 1 tablet by mouth 3 (Three) Times a Day.   Yes Na Arguelles MD   citalopram (CeleXA) 20 MG tablet Take 1 tablet by mouth Daily.   Yes Na Arguelles MD   ibuprofen (ADVIL,MOTRIN) 200 MG tablet Take  by mouth Every 6 (Six) Hours.   Yes Na Arguelles MD   omeprazole (priLOSEC) 20 MG capsule Take 1 capsule by mouth Daily As Needed. 7/9/21  Yes Na Arguelles MD   albuterol sulfate  (90 Base) MCG/ACT inhaler 2 puffs Every 6 (Six) Hours As Needed. 10/24/22   Na Arguelles MD   dicyclomine (BENTYL) 20 MG tablet Take 1 tablet by mouth Every 6 (Six) Hours.  Patient taking differently: Take 1 tablet by mouth Every 6 (Six) Hours As Needed. 9/28/20   Fiorella Sandoval MD   furosemide (LASIX) 20 MG tablet Take  by mouth Daily.    ProviderNa MD   meloxicam (MOBIC) 15 MG tablet Take  by mouth. 3/16/17   Na Arguelles MD   meloxicam (MOBIC) 15 MG tablet Take 1 tablet by mouth Daily. 11/16/22   Na Arguelles MD   nystatin (MYCOSTATIN) 727726 UNIT/GM cream Apply 1 application topically to the appropriate area as directed 2 (Two) Times a Day. 3/6/23   Lisbet Vargas APRN   nystatin-triamcinolone (MYCOLOG) 680109-5.1 UNIT/GM-% ointment Apply  topically to the appropriate area as directed 2 (Two) Times a Day. 9/14/21   Lisbet Vargas APRN   ondansetron ODT (ZOFRAN-ODT) 4 MG disintegrating tablet Place 1 tablet on the tongue Every 8 (Eight) Hours As Needed for Nausea or Vomiting. 9/28/20   Fiorella Sandoval MD   potassium chloride (MICRO-K) 10 MEQ CR capsule Take  by mouth Daily.    ProviderNa MD   Saxenda 18 MG/3ML injection pen START 0.6  "MG SUBCUTANEOUSLY DAILY X1 WEEK THEN INCREASE BY 0.6 MG WEEKLY UNTIL TARGET DOSE OF 3 MG 12/23/22   ProviderNa MD   therapeutic multivitamin-minerals (THERAGRAN-M) tablet Take  by mouth.    Provider, MD Na     I have utilized all available immediate resources to obtain, update, or review the patient's current medications (including all prescriptions, over-the-counter products, herbals, cannabis/cannabidiol products, and vitamin/mineral/dietary (nutritional) supplements).    Objective     Vital Signs: /55   Pulse 81   Temp 98.3 °F (36.8 °C) (Oral)   Resp 16   Ht 152.4 cm (60\")   Wt (!) 145 kg (320 lb)   LMP 09/06/2023   SpO2 96%   BMI 62.50 kg/m²   Physical Exam  Vitals and nursing note reviewed.   Constitutional:       General: She is not in acute distress.     Appearance: She is morbidly obese. She is not diaphoretic.   HENT:      Head: Normocephalic and atraumatic.      Right Ear: External ear normal.      Left Ear: External ear normal.   Eyes:      General: No scleral icterus.     Extraocular Movements: Extraocular movements intact.      Conjunctiva/sclera: Conjunctivae normal.   Cardiovascular:      Rate and Rhythm: Normal rate and regular rhythm.      Heart sounds: Normal heart sounds.   Pulmonary:      Effort: Pulmonary effort is normal. No respiratory distress.      Breath sounds: Normal breath sounds.   Chest:      Chest wall: No tenderness.   Abdominal:      General: Bowel sounds are normal.      Palpations: Abdomen is soft.      Tenderness: There is no abdominal tenderness.      Hernia: A hernia is present.   Musculoskeletal:         General: No swelling or tenderness.      Cervical back: Normal range of motion and neck supple.      Right lower leg: No edema.      Left lower leg: No edema.   Skin:     General: Skin is warm and dry.      Capillary Refill: Capillary refill takes less than 2 seconds.      Findings: No erythema or rash.   Neurological:      General: No focal " deficit present.      Mental Status: She is alert and oriented to person, place, and time.      Motor: No weakness.   Psychiatric:         Behavior: Behavior normal.            Results Reviewed:  Lab Results (last 24 hours)       Procedure Component Value Units Date/Time    Lipid Panel [[id number]] Collected: 09/20/23 1034    Specimen: Blood Updated: 09/20/23 1504    Factor 5 Leiden [[id number]] Collected: 09/20/23 0849    Specimen: Blood Updated: 09/20/23 1439    High Sensitivity Troponin T 2Hr [[id number]] Collected: 09/20/23 1034    Specimen: Blood Updated: 09/20/23 1112     HS Troponin T <6 ng/L      Troponin T Delta --     Comment: Unable to calculate.       Narrative:      High Sensitive Troponin T Reference Range:  <10.0 ng/L- Negative Female for AMI  <15.0 ng/L- Negative Male for AMI  >=10 - Abnormal Female indicating possible myocardial injury.  >=15 - Abnormal Male indicating possible myocardial injury.   Clinicians would have to utilize clinical acumen, EKG, Troponin, and serial changes to determine if it is an Acute Myocardial Infarction or myocardial injury due to an underlying chronic condition.         [place] Draw [[id number]] Collected: 09/20/23 0849    Specimen: Blood Updated: 09/20/23 1000    Narrative:      The following orders were created for panel order [place] Draw.  Procedure                               Abnormality         Status                     ---------                               -----------         ------                     Green Top (Gel)[[id number]]                                  Final result               Lavender Top[[id number]]                                     Final result               Red Top[[id number]]                                          Final result               Light Blue Top[[id number]]                                   Final result                 Please view results for these tests on the individual orders.    Lavender Top [[id number]] Collected: 09/20/23 0849     Specimen: Blood Updated: 09/20/23 1000     Extra Tube hold for add-on     Comment: Auto resulted       Red Top [[id number]] Collected: 09/20/23 0849    Specimen: Blood Updated: 09/20/23 1000     Extra Tube Hold for add-ons.     Comment: Auto resulted.       Light Blue Top [[id number]] Collected: 09/20/23 0849    Specimen: Blood Updated: 09/20/23 1000     Extra Tube Hold for add-ons.     Comment: Auto resulted       Green Top (Gel) [[id number]] Collected: 09/20/23 0849    Specimen: Blood Updated: 09/20/23 1000     Extra Tube Hold for add-ons.     Comment: Auto resulted.       Comprehensive Metabolic Panel [[id number]]  (Abnormal) Collected: 09/20/23 0849    Specimen: Blood Updated: 09/20/23 0936     Glucose 106 mg/dL      BUN 13 mg/dL      Creatinine 0.61 mg/dL      Sodium 134 mmol/L      Potassium 4.0 mmol/L      Chloride 102 mmol/L      CO2 21.0 mmol/L      Calcium 8.5 mg/dL      Total Protein 7.1 g/dL      Albumin 4.0 g/dL      ALT (SGPT) 60 U/L      AST (SGOT) 51 U/L      Alkaline Phosphatase 108 U/L      Total Bilirubin 0.4 mg/dL      Globulin 3.1 gm/dL      A/G Ratio 1.3 g/dL      BUN/Creatinine Ratio 21.3     Anion Gap 11.0 mmol/L      eGFR 117.5 mL/min/1.73     Narrative:      GFR Normal >60  Chronic Kidney Disease <60  Kidney Failure <15      High Sensitivity Troponin T [[id number]]  (Normal) Collected: 09/20/23 0849    Specimen: Blood Updated: 09/20/23 0931     HS Troponin T <6 ng/L     Narrative:      High Sensitive Troponin T Reference Range:  <10.0 ng/L- Negative Female for AMI  <15.0 ng/L- Negative Male for AMI  >=10 - Abnormal Female indicating possible myocardial injury.  >=15 - Abnormal Male indicating possible myocardial injury.   Clinicians would have to utilize clinical acumen, EKG, Troponin, and serial changes to determine if it is an Acute Myocardial Infarction or myocardial injury due to an underlying chronic condition.         Lipase [[id number]]  (Normal) Collected: 09/20/23 0849    Specimen: Blood  "Updated: 09/20/23 0931     Lipase 15 U/L     D-dimer, Quantitative [[id number]]  (Abnormal) Collected: 09/20/23 0849    Specimen: Blood Updated: 09/20/23 0923     D-Dimer, Quantitative 1.73 MCGFEU/mL     Narrative:      According to the assay 's published package insert, a normal (<0.50 MCGFEU/mL) D-dimer result in conjunction with a non-high clinical probability assessment, excludes deep vein thrombosis (DVT) and pulmonary embolism (PE) with high sensitivity.    D-dimer values increase with age and this can make VTE exclusion of an older population difficult. To address this, the American College of [employer], based on best available evidence and recent guidelines, recommends that clinicians use age-adjusted D-dimer thresholds in patients greater than 50 years of age with: a) a low probability of PE who do not meet all Pulmonary Embolism Rule Out Criteria, or b) in those with intermediate probability of PE.   The formula for an age-adjusted D-dimer cut-off is \"age/100\".  For example, a 60 year old patient would have an age-adjusted cut-off of 0.60 MCGFEU/mL and an 80 year old 0.80 MCGFEU/mL.    Urinalysis With Culture If Indicated - Urine, Clean Catch [[id number]]  (Normal) Collected: 09/20/23 0901    Specimen: Urine, Clean Catch Updated: 09/20/23 0913     Color, UA Yellow     Appearance, UA Clear     pH, UA 6.0     Specific Gravity, UA 1.019     Glucose, UA Negative     Ketones, UA Negative     Bilirubin, UA Negative     Blood, UA Negative     Protein, UA Negative     Leuk Esterase, UA Negative     Nitrite, UA Negative     Urobilinogen, UA 0.2 E.U./dL    Narrative:      In absence of clinical symptoms, the presence of pyuria, bacteria, and/or nitrites on the urinalysis result does not correlate with infection.  Urine microscopic not indicated.    CBC & Differential [[id number]]  (Abnormal) Collected: 09/20/23 0849    Specimen: Blood Updated: 09/20/23 0913    Narrative:      The following orders were " created for panel order CBC & Differential.  Procedure                               Abnormality         Status                     ---------                               -----------         ------                     CBC Auto Differential[[id number]]        Abnormal            Final result                 Please view results for these tests on the individual orders.    CBC Auto Differential [[id number]]  (Abnormal) Collected: 09/20/23 0849    Specimen: Blood Updated: 09/20/23 0913     WBC 6.87 10*3/mm3      RBC 4.53 10*6/mm3      Hemoglobin 11.5 g/dL      Hematocrit 37.6 %      MCV 83.0 fL      MCH 25.4 pg      MCHC 30.6 g/dL      RDW 14.4 %      RDW-SD 43.2 fl      MPV 9.4 fL      Platelets 241 10*3/mm3      Neutrophil % 70.1 %      Lymphocyte % 21.4 %      Monocyte % 5.2 %      Eosinophil % 2.3 %      Basophil % 0.6 %      Immature Grans % 0.4 %      Neutrophils, Absolute 4.81 10*3/mm3      Lymphocytes, Absolute 1.47 10*3/mm3      Monocytes, Absolute 0.36 10*3/mm3      Eosinophils, Absolute 0.16 10*3/mm3      Basophils, Absolute 0.04 10*3/mm3      Immature Grans, Absolute 0.03 10*3/mm3      nRBC 0.0 /100 WBC     POC Urine Pregnancy [[id number]]  (Normal) Collected: 09/20/23 0910    Specimen: Urine Updated: 09/20/23 0911     HCG, Urine, QL Negative     Lot Number [id number]     Internal Positive Control Positive     Internal Negative Control Negative     Expiration Date 1,102,025          Imaging Results (Last 24 Hours)       Procedure Component Value Units Date/Time    US Venous Doppler Lower Extremity Bilateral (duplex) [[id number]] Resulted: 09/20/23 1503     Updated: 09/20/23 1503    CT Abdomen Pelvis With Contrast [[id number]] Collected: 09/20/23 1111     Updated: 09/20/23 1122    Narrative:      EXAM: CT ABDOMEN PELVIS W CONTRAST-     INDICATION: Abdominal pain, acute, nonlocalized        TECHNIQUE: Helically acquired CT images were obtained of the abdomen and  pelvis after the administration of intravenous  contrast. Coronal and  sagittal reformations were performed.        CONTRAST:  Isovue-300      DOSE LENGTH PRODUCT: 1722 mGy cm. Automated exposure control was also  utilized to decrease patient radiation dose.     COMPARISON: 3/1/2023     FINDINGS:     Lower Chest: Unremarkable.     Liver: Hepatic steatosis.     Biliary Tree: Status post cholecystectomy.     Spleen: Unremarkable.     Pancreas: Unremarkable.     Adrenal Glands: Unremarkable.     Kidneys/Ureters/Bladder: Subcentimeter low-density lesions in the  bilateral kidneys are too small to characterize but likely unchanged,  most likely representing a small cyst.     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 This has progressively  increased in size dating back to 3/15/2022. This currently measures up  to 12.7 x 5.2 cm, 2.8 x 1.8 cm on 3/15/2022.     Gastrointestinal Tract: Suture line in the sigmoid colon. Colonic  diverticulosis. See below findings.     Lymphatics: Unremarkable.     Vasculature: Unremarkable.      Peritoneum/Retroperitoneum: Unremarkable.     Abdominal Wall/Soft Tissues: Rectus diastases with a fat and small bowel  containing hernia without evidence of obstruction.     Osseous Structures: Unremarkable.       Impression:            No acute findings in the abdomen/pelvis.     Fat attenuating lesion within the myometrium has progressively increased  in size dating back to 3/15/2022. This is favored to represent a  lipoleiomyoma, however given significant increase in size recommend  OB/GYN consultation.     Hepatic steatosis.     This report was signed and finalized on 9/20/2023 11:19 AM CDT by Renzo Myers.       CT Angiogram Chest [[id number]] Collected: 09/20/23 1057     Updated: 09/20/23 1105    Narrative:      CT ANGIOGRAM CHEST- 9/20/2023 10:41 AM CDT      HISTORY: Pulmonary embolism (PE) suspected, unknown D-dimer      COMPARISON: CT scan dated 9/29/2021     DOSE LENGTH  PRODUCT: 245 mGy cm. Automated exposure control was also  utilized to decrease patient radiation dose.     TECHNIQUE: Helical tomographic images of the chest were obtained after  the administration of intravenous contrast following angiogram protocol.  Additionally, 3D and multiplanar reformatted images were provided.       FINDINGS:    Pulmonary arteries: There is adequate enhancement of the pulmonary  arteries to evaluate for central and segmental pulmonary emboli. Acute  pulmonary embolus identified in the right lower lobe pulmonary artery  extending down into the segmental pulmonary arteries. There is also  embolus in the right middle lobe pulmonary artery which extends into the  medial segment pulmonary artery. There is segmental and subsegmental  embolus identified in the right upper lobe pulmonary arteries. No  pulmonary embolus identified on the left. Central pulmonary arteries are  nondilated.     AORTA AND GREAT VESSELS: Thoracic aorta is normal in caliber. No  dissection identified. No flow-limiting stenosis identified at the great  vessel origins.     VISUALIZED NECK BASE: The imaged portion of the base of the neck appears  unremarkable.     LUNGS: The lungs are clear. There is no mass, worrisome nodule, or  consolidation. No pleural effusion is seen. Airways are clear.     HEART: The heart is normal in size. There is no pericardial effusion.     MEDIASTINUM AND LYMPH NODEs: No suspicious hilar or mediastinal  adenopathy..     SKELETAL AND SOFT TISSUES: Chest wall soft tissues are unremarkable. No  acute bony abnormality. Thoracic spine degenerative change.     UPPER ABDOMEN: The imaged portion of the upper abdomen demonstrates no  acute process.       Impression:      1.  Acute pulmonary embolus identified in the right upper, right middle  and right lower lobes. No pulmonary embolus identified on the left. The  central pulmonary arteries are nondilated. No CT evidence for right  heart strain.  2.  Lungs  are clear and well expanded.     Findings and recommendations were discussed with PIA HORNE on  9/20/2023 11:01 AM CDT at 9/20/2023 11:01 AM CDT.     This report was signed and finalized on 9/20/2023 11:02 AM CDT by Dr Mati Womack.       XR Chest 1 View [[id number]] Collected: 09/20/23 0938     Updated: 09/20/23 0942    Narrative:      XR CHEST 1 VW-     HISTORY: Chest Pain Protocol     COMPARISON: 9/29/2021     FINDINGS: Frontal view of the chest obtained.     Slightly diminished level of inspiration. Lungs are free of  consolidation, collapse, or edema. The heart appears borderline  enlarged. No radiographic evidence of edema. No pleural effusion or  pneumothorax. No acute regional bony pathology.          Impression:      Borderline cardiomegaly on this portable AP study. Diminished level of  inspiration with no acute pulmonary process.     This report was signed and finalized on 9/20/2023 9:39 AM CDT by Dr. Alisa Morales MD.             I have personally reviewed and interpreted the radiology studies and ECG obtained at time of admission.     Assessment / Plan   Assessment:   Active Hospital Problems    Diagnosis     **Pulmonary emboli     Pulmonary embolism     Class 3 severe obesity due to excess calories with serious comorbidity and body mass index (BMI) of 60.0 to 69.9 in adult     Obstructive sleep apnea        Treatment Plan  The patient will be admitted to my service here at [facility].  Acute pulmonary embolism: Start patient on full anticoagulation.  - Obtain hypercoagulability study  - Echocardiogram  - Bilateral lower extremity Doppler  - Cardiac monitoring  - Vital signs per protocol    We will had hemoglobin A1c, lipid panel to labs    CPAP to sleep overnight    Morbid obesity: Body mass index is 62.5 kg/m².  PT OT when patient      Medical Decision Making  Number and Complexity of problems: 1 acute medical problem with high complexity.  Differential Diagnosis: As  above    Conditions and Status        Condition is unchanged.     [facility] Data  External documents reviewed: Epic records  Cardiac tracing (EKG, telemetry) interpretation: EKG reviewed  Radiology interpretation: Imaging reviewed  Labs reviewed: Yes  Any tests that were considered but not ordered: Will order hypercoagulability test.      Decision rules/scores evaluated (example [id number], Wells, etc): N/A     Discussed with: Patient     Care Planning  Shared decision making: Patient apprised of current labs, vitals, imaging and treatment plan.  They are agreeable with proceeding with plans as discussed.    Code status and discussions:   Code Status and Medical Interventions:   Ordered at: 09/20/23 1430     Level Of Support Discussed With:    Patient     Code Status (Patient has no pulse and is not breathing):    CPR (Attempt to Resuscitate)     Medical Interventions (Patient has pulse or is breathing):    Full Support         Disposition  Social Determinants of Health that impact treatment or disposition: none  Estimated length of stay is 1-2 days.     I confirmed that the patient's advanced care plan is present, code status is documented, and a surrogate decision maker is listed in the patient's medical record.     The patient's surrogate decision maker is patient's brother Rao Banerjee, updated ACP documentation.  Patient will provide once her cell phone gets charged.    The patient was seen and examined by me on 9/20/2023 at 1:50 PM.    Electronically signed by Jaqui East MD, 09/20/23, 15:17 CDT.               Electronically signed by Jaqui East MD at 09/20/23 1517       Orders (last 72 hrs)        Start     Ordered    09/27/23 2100  apixaban (ELIQUIS) tablet 5 mg  Every 12 Hours Scheduled,   Status:  Discontinued        [facility].    09/20/23 1432    09/27/23 0000  apixaban (ELIQUIS) tablet 5 mg  Every 12 Hours Scheduled,   Status:  Discontinued        See  [facility] for full Linked Orders Report.    09/20/23 1431    09/27/23 0000  apixaban (ELIQUIS) 5 MG tablet tablet  Every 12 Hours Scheduled         09/21/23 1020    09/21/23 2100  rosuvastatin (CRESTOR) tablet 20 mg  Nightly,   Status:  Discontinued         09/21/23 0929    09/21/23 1045  losartan (COZAAR) tablet 50 mg  Every 24 Hours Scheduled,   Status:  Discontinued         09/21/23 0954    09/21/23 1021  Case Management  Consult  Once        Provider:  (Not yet assigned)    09/21/23 1021    09/21/23 1019  Discontinue IV  Once,   Status:  Canceled         09/21/23 1020    09/21/23 1014  Discharge patient  Once         09/21/23 1020    09/21/23 0600  pantoprazole (PROTONIX) EC tablet 40 mg  Every Early Morning,   Status:  Discontinued         09/20/23 1430    09/21/23 0600  Basic Metabolic Panel  Daily,   Status:  Canceled       09/20/23 1430    09/21/23 0600  CBC & Differential  Daily,   Status:  Canceled       09/20/23 1430    09/21/23 0600  CBC Auto Differential  PROCEDURE ONCE         09/20/23 2202    09/21/23 0000  acetaminophen (TYLENOL) 325 MG tablet  Every 6 Hours PRN         09/21/23 1020    09/21/23 0000  apixaban (ELIQUIS) 5 MG tablet tablet  Every 12 Hours Scheduled         09/21/23 1020    09/21/23 0000  losartan (COZAAR) 50 MG tablet  Every 24 Hours Scheduled         09/21/23 1020    09/21/23 0000  rosuvastatin (CRESTOR) 20 MG tablet  Nightly         09/21/23 1020    09/21/23 0000  Discharge Follow-up with PCP         09/21/23 1020    09/21/23 0000  Ambulatory Referral to Hematology         09/21/23 1020    09/21/23 0000  Activity as Tolerated         09/21/23 1020    09/21/23 0000  Diet: Cardiac Diets; Healthy Heart (2-3 Na+); Thin (IDDSI 0)         09/21/23 1020    09/20/23 2213  Initiate & Follow Hypercapnic Monitoring Guideline for Opioid Administration via EtCO2 and / or SpO2  Continuous,   Status:  Canceled        Comments: Follow Hypercapnic Monitoring Guideline As Outlined in  Process Instructions (Open Order Report to View Full Instructions)    09/20/23 2212 09/20/23 2213  Opioid Administration - Document EtCO2 and / or SpO2 With Each Set of Vitals & Any Change in Patient Status  Per Order Details,   Status:  Canceled        Comments: With Each Set of Vitals & Any Change in Patient Status    09/20/23 2212 09/20/23 2213  Opioid Administration - Notify Provider Hypercapnic Monitoring  Until Discontinued,   Status:  Canceled         09/20/23 2212 09/20/23 2213  Opioid Administration - Continuous Pulse Oximetry (SpO2)  Continuous,   Status:  Canceled         09/20/23 2212 09/20/23 2100  sennosides-docusate (PERICOLACE) 8.6-50 MG per tablet 2 tablet  2 Times Daily,   Status:  Discontinued        See Hyperspace for full Linked Orders Report.    09/20/23 1430 09/20/23 2100  apixaban (ELIQUIS) tablet 10 mg  Every 12 Hours Scheduled,   Status:  Discontinued        See Hyperspace for full Linked Orders Report.    09/20/23 1432 09/20/23 1800  citalopram (CeleXA) tablet 20 mg  Daily,   Status:  Discontinued         09/20/23 1430    09/20/23 1800  Oral Care  2 Times Daily,   Status:  Canceled       09/20/23 1430    09/20/23 1800  Incentive Spirometry  Every 4 Hours While Awake,   Status:  Canceled       09/20/23 1430    09/20/23 1618  perflutren (DEFINITY) lipid microspheres injection 6.52 mg  Once         09/20/23 1602    09/20/23 1600  Vital Signs  Every 4 Hours,   Status:  Canceled       09/20/23 1430    09/20/23 1504  Patient May Use Home CPAP / BIPAP For Sleep or As Needed  As Needed,   Status:  Canceled       09/20/23 1504    09/20/23 1504  Hemoglobin A1c  Once         09/20/23 1503    09/20/23 1504  Lipid Panel  Once         09/20/23 1503    09/20/23 1447  apixaban (ELIQUIS) tablet 10 mg  Every 12 Hours Scheduled,   Status:  Discontinued        See Hyperspace for full Linked Orders Report.    09/20/23 1431    09/20/23 1446  multivitamin with minerals 1 tablet  Daily,    Status:  Discontinued         09/20/23 1430    09/20/23 1446  sodium chloride 0.9 % flush 10 mL  Every 12 Hours Scheduled,   Status:  Discontinued         09/20/23 1430    09/20/23 1435  Factor 5 Leiden  Once         09/20/23 1435    09/20/23 1435  Homocysteine  Once         09/20/23 1435    09/20/23 1435  Lupus Anticoagulant  Once         09/20/23 1435    09/20/23 1435  Protein S Antigen, Total  Once         09/20/23 1435    09/20/23 1435  Protein S Antigen, Free  Once         09/20/23 1435    09/20/23 1435  Protein S Functional  Once         09/20/23 1435    09/20/23 1435  Protein C Antigen, Total  Once         09/20/23 1435    09/20/23 1435  Protein C-Functional  Once         09/20/23 1435    09/20/23 1433  PT Consult: Eval & Treat Functional Mobility Below Baseline  Once         09/20/23 1432    09/20/23 1431  Intake & Output  Every Shift,   Status:  Canceled       09/20/23 1430    09/20/23 1431  Weigh Patient  Once         09/20/23 1430    09/20/23 1431  Insert Peripheral IV  Once,   Status:  Canceled         09/20/23 1430    09/20/23 1431  Saline Lock & Maintain IV Access  Continuous,   Status:  Canceled         09/20/23 1430    09/20/23 1431  VTE Prophylaxis Not Indicated: Other: Patient Currently Anticoagulated / Receiving Prophylaxis  Once         09/20/23 1430    09/20/23 1431  Continuous Cardiac Monitoring  Continuous,   Status:  Canceled        Comments: Follow Standing Orders As Outlined in Process Instructions (Open Order Report to View Full Instructions)    09/20/23 1430    09/20/23 1431  Telemetry - Maintain IV Access  Continuous         09/20/23 1430    09/20/23 1431  Telemetry - Place Orders & Notify Provider of Results When Patient Experiences Acute Chest Pain, Dysrhythmia or Respiratory Distress  Until Discontinued         09/20/23 1430    09/20/23 1431  May Be Off Telemetry for Tests  Continuous         09/20/23 1430    09/20/23 1431  Pulse Oximetry, Continuous  Continuous,   Status:  Canceled          09/20/23 1430 09/20/23 1431  Activity - Ad Roberta  Until Discontinued         09/20/23 1430 09/20/23 1431  Diet: Cardiac Diets; Healthy Heart (2-3 Na+); Texture: Regular Texture (IDDSI 7); Fluid Consistency: Thin (IDDSI 0)  Diet Effective Now,   Status:  Canceled         09/20/23 1430    09/20/23 1431  Adult Transthoracic Echo Complete w/ Color, Spectral and Contrast if necessary per protocol  Once         09/20/23 1430 09/20/23 1431  US Venous Doppler Lower Extremity Bilateral (duplex)  1 Time Imaging         09/20/23 1430 09/20/23 1430  albuterol (PROVENTIL) nebulizer solution 0.083% 2.5 mg/3mL  Every 6 Hours PRN,   Status:  Discontinued         09/20/23 1430 09/20/23 1430  sodium chloride 0.9 % flush 10 mL  As Needed,   Status:  Discontinued         09/20/23 1430    09/20/23 1430  sodium chloride 0.9 % infusion 40 mL  As Needed,   Status:  Discontinued         09/20/23 1430 09/20/23 1430  polyethylene glycol (MIRALAX) packet 17 g  Daily PRN,   Status:  Discontinued        See Hyperspace for full Linked Orders Report.    09/20/23 1430 09/20/23 1430  bisacodyl (DULCOLAX) EC tablet 5 mg  Daily PRN,   Status:  Discontinued        See Hyperspace for full Linked Orders Report.    09/20/23 1430 09/20/23 1430  bisacodyl (DULCOLAX) suppository 10 mg  Daily PRN,   Status:  Discontinued        See Hyperspace for full Linked Orders Report.    09/20/23 1430 09/20/23 1430  nitroglycerin (NITROSTAT) SL tablet 0.4 mg  Every 5 Minutes PRN,   Status:  Discontinued         09/20/23 1430 09/20/23 1430  Oxygen Therapy- Nasal Cannula; Titrate 1-6 LPM Per SpO2; 90 - 95%  Continuous PRN,   Status:  Canceled       09/20/23 1430    09/20/23 1430  acetaminophen (TYLENOL) tablet 650 mg  Every 4 Hours PRN,   Status:  Discontinued        See Hyperspace for full Linked Orders Report.    09/20/23 1430 09/20/23 1430  acetaminophen (TYLENOL) 160 MG/5ML oral solution 650 mg  Every 4 Hours PRN,   Status:   Discontinued        See Hyperspace for full Linked Orders Report.    09/20/23 1430    09/20/23 1430  acetaminophen (TYLENOL) suppository 650 mg  Every 4 Hours PRN,   Status:  Discontinued        See Hyperspace for full Linked Orders Report.    09/20/23 1430    09/20/23 1430  Morphine sulfate (PF) injection 1 mg  Every 4 Hours PRN,   Status:  Discontinued        See Hyperspace for full Linked Orders Report.    09/20/23 1430    09/20/23 1430  naloxone (NARCAN) injection 0.4 mg  Every 5 Minutes PRN,   Status:  Discontinued        See Hyperspace for full Linked Orders Report.    09/20/23 1430    09/20/23 1430  LORazepam (ATIVAN) tablet 0.5 mg  Every 8 Hours PRN,   Status:  Discontinued         09/20/23 1430    09/20/23 1430  melatonin tablet 5 mg  Nightly PRN,   Status:  Discontinued         09/20/23 1430    09/20/23 1430  ondansetron (ZOFRAN) tablet 4 mg  Every 6 Hours PRN,   Status:  Discontinued        See Hyperspace for full Linked Orders Report.    09/20/23 1430    09/20/23 1430  ondansetron (ZOFRAN) injection 4 mg  Every 6 Hours PRN,   Status:  Discontinued        See Hyperspace for full Linked Orders Report.    09/20/23 1430    09/20/23 1426  Code Status and Medical Interventions:  Continuous,   Status:  Canceled         09/20/23 1430    09/20/23 1148  Initiate Observation Status  Once         09/20/23 1148    09/20/23 1140  Inpatient Admission  Once,   Status:  Canceled         09/20/23 1139    09/20/23 1117  Enoxaparin Sodium (LOVENOX) syringe 150 mg  Once         09/20/23 1101    09/20/23 1105  iopamidol (ISOVUE-370) 76 % injection 100 mL  Once in Imaging         09/20/23 1049    09/20/23 1049  High Sensitivity Troponin T 2Hr  PROCEDURE ONCE         09/20/23 0931    09/20/23 1006  HYDROmorphone (DILAUDID) injection 1 mg  Once         09/20/23 0950    09/20/23 0946  CT Angiogram Chest  1 Time Imaging         09/20/23 0945    09/20/23 0946  CT Abdomen Pelvis With Contrast  1 Time Imaging        Comments: IV  CONTRAST ONLY      09/20/23 0945    09/20/23 0852  aspirin chewable tablet 324 mg  Once         09/20/23 0836    09/20/23 0852  morphine injection 4 mg  Once         09/20/23 0836    09/20/23 0852  ondansetron (ZOFRAN) injection 4 mg  Once         09/20/23 0836    09/20/23 0836  Cardiac Monitoring  Continuous,   Status:  Canceled        Comments: Follow Standing Orders As Outlined in Process Instructions (Open Order Report to View Full Instructions)    09/20/23 0836    09/20/23 0836  Continuous Pulse Oximetry, Add Qxygen if SaO2 is Below 90%  Continuous,   Status:  Canceled        Comments: Add Oxygen if SaO2 is Below 90%.    09/20/23 0836    09/20/23 0836  Vital Signs  Per Hospital Policy         09/20/23 0836    09/20/23 0836  ECG 12 Lead Chest Pain  Now & in 2 Hours,   Status:  Canceled       09/20/23 0836    09/20/23 0836  XR Chest 1 View  1 Time Imaging         09/20/23 0836    09/20/23 0836  Insert Peripheral IV  Once,   Status:  Canceled         09/20/23 0836    09/20/23 0836  [place] Draw  Once         09/20/23 0836    09/20/23 0836  CBC & Differential  Once         09/20/23 0836    09/20/23 0836  Comprehensive Metabolic Panel  Once         09/20/23 0836    09/20/23 0836  High Sensitivity Troponin T  Once         09/20/23 0836    09/20/23 0836  D-dimer, Quantitative  Once         09/20/23 0836    09/20/23 0836  Lipase  Once         09/20/23 0836    09/20/23 0836  POC Urine Pregnancy  STAT         09/20/23 0836    09/20/23 0836  Urinalysis With Culture If Indicated - Urine, Clean Catch  Once         09/20/23 0836    09/20/23 0836  Insert Peripheral IV  Once,   Status:  Canceled        See [place] for full Linked Orders Report.    09/20/23 0836    09/20/23 0836  Green Top (Gel)  PROCEDURE ONCE         09/20/23 0836    09/20/23 0836  Lavender Top  PROCEDURE ONCE         09/20/23 0836    09/20/23 0836  Red Top  PROCEDURE ONCE         09/20/23 0836    09/20/23 0836  Light Blue Top  PROCEDURE ONCE          09/20/23 0836    09/20/23 0836  CBC Auto Differential  PROCEDURE ONCE         09/20/23 0836    09/20/23 0835  sodium chloride 0.9 % flush 10 mL  As Needed,   Status:  Discontinued        See Hyperspace for full Linked Orders Report.    09/20/23 0836    09/20/23 0835  Oxygen Therapy- Nasal Cannula; Titrate 1-6 LPM Per SpO2; 90 - 95%  Continuous PRN,   Status:  Canceled       09/20/23 0836    09/20/23 0835  ECG 12 Lead Chest Pain  As Needed,   Status:  Canceled      Comments: Q15 minutes x 4 for first hour after persistent or recurrent chest pain    09/20/23 0836    09/20/23 0835  ECG 12 Lead Chest Pain  As Needed,   Status:  Canceled      Comments: Persistent, unrelieved or recurrent chest pain after the first hour of treatment    09/20/23 0836    09/20/23 0835  sodium chloride 0.9 % flush 10 mL  As Needed,   Status:  Discontinued         09/20/23 0836    09/20/23 0823  ECG 12 Lead Chest Pain  STAT         09/20/23 0822    --  citalopram (CeleXA) 10 MG tablet  Daily         09/20/23 1440    --  ibuprofen (ADVIL,MOTRIN) 200 MG tablet  Every 6 Hours PRN,   Status:  Discontinued         09/20/23 1441    --  SCANNED - TELEMETRY           09/20/23 0000    --  SCANNED EKG         09/20/23 0000    --  SCANNED EKG         09/20/23 0000    --  SCANNED - TELEMETRY           09/20/23 0000    --  SCANNED - TELEMETRY           09/20/23 0000                     Discharge Summary        Jaqui East MD at 09/21/23 1011                [facility]  DISCHARGE SUMMARY       Date of Admission: 9/20/2023  Date of Discharge:  9/21/2023  Primary Care Physician: Douglas Carnes MD    Presenting Problem/History of Present Illness:  Adapted from H&P from 9/20/23  Patient is a 37-year-old female with history of anxiety, morbid obesity and current who presents to the ER due to worsening shortness of breath.  Patient reports symptom onset was 3 days ago.  ER work-up was positive for an  acute pulmonary embolus in the right upper, middle and lower lobe, with no evidence of a right heart strain.  Patient was started on treatment dose anticoagulation Lovenox.  Patient denies recent travel, tobacco use, illicit drug use, birth control use, or family history of blood clots.  Patient reports some occasional pains in the feet and shin.      Final Discharge Diagnoses:  Active Hospital Problems    Diagnosis     **Pulmonary emboli     Essential hypertension     Class 3 severe obesity due to excess calories with serious comorbidity and body mass index (BMI) of 60.0 to 69.9 in adult     Prediabetes     Obstructive sleep apnea        Consults: none     Procedures Performed: none     Pertinent Test Results:   Results for orders placed during the hospital encounter of 09/20/23    Adult Transthoracic Echo Complete w/ Color, Spectral and Contrast if necessary per protocol    Interpretation Summary    Left ventricular systolic function is normal. Left ventricular ejection fraction appears to be 61 - 65%.    Left ventricular wall thickness is consistent with mild to moderate concentric hypertrophy.    Normal right ventricular cavity size and systolic function noted.    No evidence of a patent foramen ovale.    No significant valvular abnormalities identified on this study.      Imaging Results (All)       Procedure Component Value Units Date/Time    US Venous Doppler Lower Extremity Bilateral (duplex) [[id number]] Resulted: 09/20/23 1503     Updated: 09/20/23 1623    CT Abdomen Pelvis With Contrast [[id number]] Collected: 09/20/23 1111     Updated: 09/20/23 1122    Narrative:      EXAM: CT ABDOMEN PELVIS W CONTRAST-     INDICATION: Abdominal pain, acute, nonlocalized        TECHNIQUE: Helically acquired CT images were obtained of the abdomen and  pelvis after the administration of intravenous contrast. Coronal and  sagittal reformations were performed.        CONTRAST:  Isovue-300      DOSE LENGTH PRODUCT: 1722 mGy cm.  Automated exposure control was also  utilized to decrease patient radiation dose.     COMPARISON: 3/1/2023     FINDINGS:     Lower Chest: Unremarkable.     Liver: Hepatic steatosis.     Biliary Tree: Status post cholecystectomy.     Spleen: Unremarkable.     Pancreas: Unremarkable.     Adrenal Glands: Unremarkable.     Kidneys/Ureters/Bladder: Subcentimeter low-density lesions in the  bilateral kidneys are too small to characterize but likely unchanged,  most likely representing a small cyst.     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 This has progressively  increased in size dating back to 3/15/2022. This currently measures up  to 12.7 x 5.2 cm, 2.8 x 1.8 cm on 3/15/2022.     Gastrointestinal Tract: Suture line in the sigmoid colon. Colonic  diverticulosis. See below findings.     Lymphatics: Unremarkable.     Vasculature: Unremarkable.      Peritoneum/Retroperitoneum: Unremarkable.     Abdominal Wall/Soft Tissues: Rectus diastases with a fat and small bowel  containing hernia without evidence of obstruction.     Osseous Structures: Unremarkable.       Impression:            No acute findings in the abdomen/pelvis.     Fat attenuating lesion within the myometrium has progressively increased  in size dating back to 3/15/2022. This is favored to represent a  lipoleiomyoma, however given significant increase in size recommend  OB/GYN consultation.     Hepatic steatosis.     This report was signed and finalized on 9/20/2023 11:19 AM CDT by Renzo Myers.       CT Angiogram Chest [[id number]] Collected: 09/20/23 1057     Updated: 09/20/23 1105    Narrative:      CT ANGIOGRAM CHEST- 9/20/2023 10:41 AM CDT      HISTORY: Pulmonary embolism (PE) suspected, unknown D-dimer      COMPARISON: CT scan dated 9/29/2021     DOSE LENGTH PRODUCT: 245 mGy cm. Automated exposure control was also  utilized to decrease patient radiation dose.     TECHNIQUE: Helical  tomographic images of the chest were obtained after  the administration of intravenous contrast following angiogram protocol.  Additionally, 3D and multiplanar reformatted images were provided.       FINDINGS:    Pulmonary arteries: There is adequate enhancement of the pulmonary  arteries to evaluate for central and segmental pulmonary emboli. Acute  pulmonary embolus identified in the right lower lobe pulmonary artery  extending down into the segmental pulmonary arteries. There is also  embolus in the right middle lobe pulmonary artery which extends into the  medial segment pulmonary artery. There is segmental and subsegmental  embolus identified in the right upper lobe pulmonary arteries. No  pulmonary embolus identified on the left. Central pulmonary arteries are  nondilated.     AORTA AND GREAT VESSELS: Thoracic aorta is normal in caliber. No  dissection identified. No flow-limiting stenosis identified at the great  vessel origins.     VISUALIZED NECK BASE: The imaged portion of the base of the neck appears  unremarkable.     LUNGS: The lungs are clear. There is no mass, worrisome nodule, or  consolidation. No pleural effusion is seen. Airways are clear.     HEART: The heart is normal in size. There is no pericardial effusion.     MEDIASTINUM AND LYMPH NODEs: No suspicious hilar or mediastinal  adenopathy..     SKELETAL AND SOFT TISSUES: Chest wall soft tissues are unremarkable. No  acute bony abnormality. Thoracic spine degenerative change.     UPPER ABDOMEN: The imaged portion of the upper abdomen demonstrates no  acute process.       Impression:      1.  Acute pulmonary embolus identified in the right upper, right middle  and right lower lobes. No pulmonary embolus identified on the left. The  central pulmonary arteries are nondilated. No CT evidence for right  heart strain.  2.  Lungs are clear and well expanded.     Findings and recommendations were discussed with PIA HORNE on  9/20/2023 11:01 AM CDT  at 9/20/2023 11:01 AM CDT.     This report was signed and finalized on 9/20/2023 11:02 AM CDT by Dr Mati Womack.       XR Chest 1 View [[id number]] Collected: 09/20/23 0938     Updated: 09/20/23 0942    Narrative:      XR CHEST 1 VW-     HISTORY: Chest Pain Protocol     COMPARISON: 9/29/2021     FINDINGS: Frontal view of the chest obtained.     Slightly diminished level of inspiration. Lungs are free of  consolidation, collapse, or edema. The heart appears borderline  enlarged. No radiographic evidence of edema. No pleural effusion or  pneumothorax. No acute regional bony pathology.          Impression:      Borderline cardiomegaly on this portable AP study. Diminished level of  inspiration with no acute pulmonary process.     This report was signed and finalized on 9/20/2023 9:39 AM CDT by Dr. Alisa Morales MD.             LAB RESULTS:      Lab 09/21/23  0418 09/20/23  0849   WBC 6.26 6.87   HEMOGLOBIN 10.8* 11.5*   HEMATOCRIT 35.7 37.6   PLATELETS 239 241   NEUTROS ABS 3.92 4.81   IMMATURE GRANS (ABS) 0.03 0.03   LYMPHS ABS 1.58 1.47   MONOS ABS 0.43 0.36   EOS ABS 0.25 0.16   MCV 83.8 83.0   D DIMER QUANT  --  1.73*         Lab 09/21/23  0418 09/20/23  1717 09/20/23  0849   SODIUM 138  --  134*   POTASSIUM 3.9  --  4.0   CHLORIDE 105  --  102   CO2 23.0  --  21.0*   ANION GAP 10.0  --  11.0   BUN 13  --  13   CREATININE 0.53*  --  0.61   EGFR 121.6  --  117.5   GLUCOSE 95  --  106*   CALCIUM 9.0  --  8.5*   HEMOGLOBIN A1C  --  5.70*  --          Lab 09/20/23  0849   TOTAL PROTEIN 7.1   ALBUMIN 4.0   GLOBULIN 3.1   ALT (SGPT) 60*   AST (SGOT) 51*   BILIRUBIN 0.4   ALK PHOS 108   LIPASE 15         Lab 09/20/23  1034 09/20/23  0849   HSTROP T <6 <6         Lab 09/20/23  1034   CHOLESTEROL 194   LDL CHOL 130*   HDL CHOL 32*   TRIGLYCERIDES 179*             Brief Urine Lab Results  (Last result in the past 365 days)        Color   Clarity   Blood   Leuk Est   Nitrite   Protein   CREAT   Urine HCG        09/20/23  "0910               Negative             Microbiology Results (last 10 days)       ** No results found for the last 240 hours. **            Hospital Course: Patient is a 37-year-old female with history of anxiety, morbid obesity who was admitted for an acute pulmonary embolism without a right heart strain.  Patient was started on full anticoagulation with treatment dose Lovenox.  Hypercoagulability testing were obtained and are pending at the moment.  Patient was transitioned to DOAC for PE therapy.  Echocardiogram and venous Doppler ultrasound were obtained.  Echo showed EF of 61 to 65%.  With left ventricular wall thickness which is consistent with mild to moderate concentric hypertrophy.  Blood pressure was elevated during hospital stay.  Patient was started on losartan.  Had an extensive discussion with patient about lifestyle modification to help with weight loss.  Patient advised to follow-up with PCP and discuss all her options.  I suspect this is likely provoked event since patient job requires her to sit all day.  Referral to hematology was placed.  Hypercoagulability studies and venous Doppler ultrasound of lower extremities are still pending.      Physical Exam on Discharge:  /67 (BP Location: Left arm, Patient Position: Lying)   Pulse 84   Temp 97.9 °F (36.6 °C) (Oral)   Resp 18   Ht 152.4 cm (60\")   Wt (!) 145 kg (320 lb)   LMP 09/06/2023   SpO2 96%   BMI 62.50 kg/m²   Physical Exam  Vitals and nursing note reviewed.   Constitutional:       General: She is not in acute distress.     Appearance: She is morbidly obese. She is not diaphoretic.   HENT:      Head: Normocephalic and atraumatic.      Right Ear: External ear normal.      Left Ear: External ear normal.   Cardiovascular:      Rate and Rhythm: Normal rate and regular rhythm.      Heart sounds: Normal heart sounds.   Pulmonary:      Effort: Pulmonary effort is normal. No respiratory distress.      Breath sounds: Normal breath sounds. "   Chest:      Chest wall: No tenderness.   Abdominal:      General: Bowel sounds are normal.      Palpations: Abdomen is soft.      Tenderness: There is no abdominal tenderness.   Musculoskeletal:         General: No swelling or tenderness.      Cervical back: Normal range of motion and neck supple.      Right lower leg: No edema.      Left lower leg: No edema.   Skin:     General: Skin is warm and dry.      Capillary Refill: Capillary refill takes less than 2 seconds.      Findings: No erythema or rash.   Neurological:      General: No focal deficit present.      Mental Status: She is alert and oriented to person, place, and time.      Motor: No weakness.   Psychiatric:         Behavior: Behavior normal.         Condition on Discharge: stable and improving     Discharge Disposition:  Home or Self Care    Discharge Medications:     Discharge Medications        New Medications        Instructions Start Date   acetaminophen 325 MG tablet  Commonly known as: TYLENOL   650 mg, Oral, Every 6 Hours PRN      apixaban 5 MG tablet tablet  Commonly known as: ELIQUIS   10 mg, Oral, Every 12 Hours Scheduled      apixaban 5 MG tablet tablet  Commonly known as: ELIQUIS   5 mg, Oral, Every 12 Hours Scheduled   Start Date: September 27, 2023     losartan 50 MG tablet  Commonly known as: COZAAR   50 mg, Oral, Every 24 Hours Scheduled      rosuvastatin 20 MG tablet  Commonly known as: CRESTOR   20 mg, Oral, Nightly             Continue These Medications        Instructions Start Date   busPIRone 15 MG tablet  Commonly known as: BUSPAR   15 mg, Oral, 3 Times Daily      citalopram 10 MG tablet  Commonly known as: CeleXA   10 mg, Oral, Daily             Stop These Medications      ibuprofen 200 MG tablet  Commonly known as: ADVIL,MOTRIN                Discharge Diet:   Diet Instructions       Diet: Cardiac Diets; Healthy Heart (2-3 Na+); Thin (IDDSI 0)      Discharge Diet: Cardiac Diets    Cardiac Diet: Healthy Heart (2-3 Na+)    Fluid  Consistency: Thin (IDDSI 0)            Activity at Discharge:   Activity Instructions       Activity as Tolerated              Follow-up Appointments:   Future Appointments   Date Time Provider Department Center   3/13/2024  9:00 AM Lisbet Vargas APRN [facility]       Test Results Pending at Discharge:  Hypercoagulability studies and venous Doppler ultrasound of lower extremities    Electronically signed by Saw Goodman MD, 09/21/23, 10:29 CDT.    Time: 32 minutes.          Electronically signed by Saw Goodman MD at 09/21/23 1029       Discharge Order (From admission, onward)       Start     Ordered    09/21/23 1014  Discharge patient  Once        Expected Discharge Date: 09/21/23   Discharge Disposition: Home or Self Care   Physician of Record for Attribution - Please select from Treatment Team: SAW GOODMAN [[id number]]   Review needed by CMO to determine Physician of Record: No      Question Answer Comment   Physician of Record for Attribution - Please select from Treatment Team SAW GOODMAN    Review needed by CMO to determine Physician of Record No        09/21/23 1029

## 2023-09-23 LAB — PROT C ACT/NOR PPP: 133 % (ref 73–180)

## 2023-09-24 LAB
APTT SCREEN TO CONFIRM RATIO: 0.93 RATIO (ref 0–1.34)
CONFIRM APTT/NORMAL: 40.7 SEC (ref 0–47.6)
LA 2 SCREEN W REFLEX-IMP: NORMAL
PROT C AG ACT/NOR PPP IA: 107 % (ref 60–150)
SCREEN APTT: 42 SEC (ref 0–43.5)
SCREEN DRVVT: 46.2 SEC (ref 0–47)
THROMBIN TIME: 18.5 SEC (ref 0–23)

## 2023-10-10 ENCOUNTER — APPOINTMENT (OUTPATIENT)
Dept: CT IMAGING | Facility: HOSPITAL | Age: 38
End: 2023-10-10
Payer: COMMERCIAL

## 2023-10-10 ENCOUNTER — HOSPITAL ENCOUNTER (EMERGENCY)
Facility: HOSPITAL | Age: 38
Discharge: HOME | End: 2023-10-11
Attending: EMERGENCY MEDICINE | Admitting: EMERGENCY MEDICINE
Payer: COMMERCIAL

## 2023-10-10 ENCOUNTER — APPOINTMENT (OUTPATIENT)
Dept: GENERAL RADIOLOGY | Facility: HOSPITAL | Age: 38
End: 2023-10-10
Payer: COMMERCIAL

## 2023-10-10 DIAGNOSIS — R07.9 CHEST PAIN, UNSPECIFIED TYPE: Primary | ICD-10-CM

## 2023-10-10 LAB
ALBUMIN SERPL-MCNC: 4 G/DL (ref 3.5–5.2)
ALBUMIN/GLOB SERPL: 1.6 G/DL
ALP SERPL-CCNC: 101 U/L (ref 39–117)
ALT SERPL W P-5'-P-CCNC: 41 U/L (ref 1–33)
ANION GAP SERPL CALCULATED.3IONS-SCNC: 11 MMOL/L (ref 5–15)
AST SERPL-CCNC: 33 U/L (ref 1–32)
BASOPHILS # BLD AUTO: 0.04 10*3/MM3 (ref 0–0.2)
BASOPHILS # BLD AUTO: 0.04 10*3/MM3 (ref 0–0.2)
BASOPHILS NFR BLD AUTO: 0.4 % (ref 0–1.5)
BASOPHILS NFR BLD AUTO: 0.4 % (ref 0–1.5)
BILIRUB SERPL-MCNC: 0.3 MG/DL (ref 0–1.2)
BILIRUB UR QL STRIP: NEGATIVE
BUN SERPL-MCNC: 9 MG/DL (ref 6–20)
BUN/CREAT SERPL: 15.3 (ref 7–25)
CALCIUM SPEC-SCNC: 9 MG/DL (ref 8.6–10.5)
CHLORIDE SERPL-SCNC: 104 MMOL/L (ref 98–107)
CLARITY UR: CLEAR
CO2 SERPL-SCNC: 24 MMOL/L (ref 22–29)
COLOR UR: YELLOW
CREAT SERPL-MCNC: 0.59 MG/DL (ref 0.57–1)
DEPRECATED RDW RBC AUTO: 45.5 FL (ref 37–54)
DEPRECATED RDW RBC AUTO: 45.9 FL (ref 37–54)
EGFRCR SERPLBLD CKD-EPI 2021: 117.7 ML/MIN/1.73
EOSINOPHIL # BLD AUTO: 0.12 10*3/MM3 (ref 0–0.4)
EOSINOPHIL # BLD AUTO: 0.15 10*3/MM3 (ref 0–0.4)
EOSINOPHIL NFR BLD AUTO: 1.3 % (ref 0.3–6.2)
EOSINOPHIL NFR BLD AUTO: 1.6 % (ref 0.3–6.2)
ERYTHROCYTE [DISTWIDTH] IN BLOOD BY AUTOMATED COUNT: 14.9 % (ref 12.3–15.4)
ERYTHROCYTE [DISTWIDTH] IN BLOOD BY AUTOMATED COUNT: 15.2 % (ref 12.3–15.4)
FLUAV RNA RESP QL NAA+PROBE: NOT DETECTED
FLUBV RNA RESP QL NAA+PROBE: NOT DETECTED
GEN 5 2HR TROPONIN T REFLEX: <6 NG/L
GLOBULIN UR ELPH-MCNC: 2.5 GM/DL
GLUCOSE SERPL-MCNC: 115 MG/DL (ref 65–99)
GLUCOSE UR STRIP-MCNC: NEGATIVE MG/DL
HCT VFR BLD AUTO: 25.1 % (ref 34–46.6)
HCT VFR BLD AUTO: 27.2 % (ref 34–46.6)
HGB BLD-MCNC: 7.8 G/DL (ref 12–15.9)
HGB BLD-MCNC: 8.3 G/DL (ref 12–15.9)
HGB UR QL STRIP.AUTO: NEGATIVE
HOLD SPECIMEN: NORMAL
HOLD SPECIMEN: NORMAL
IMM GRANULOCYTES # BLD AUTO: 0.04 10*3/MM3 (ref 0–0.05)
IMM GRANULOCYTES # BLD AUTO: 0.04 10*3/MM3 (ref 0–0.05)
IMM GRANULOCYTES NFR BLD AUTO: 0.4 % (ref 0–0.5)
IMM GRANULOCYTES NFR BLD AUTO: 0.4 % (ref 0–0.5)
KETONES UR QL STRIP: NEGATIVE
LEUKOCYTE ESTERASE UR QL STRIP.AUTO: NEGATIVE
LIPASE SERPL-CCNC: 20 U/L (ref 13–60)
LYMPHOCYTES # BLD AUTO: 1.96 10*3/MM3 (ref 0.7–3.1)
LYMPHOCYTES # BLD AUTO: 2.03 10*3/MM3 (ref 0.7–3.1)
LYMPHOCYTES NFR BLD AUTO: 20.5 % (ref 19.6–45.3)
LYMPHOCYTES NFR BLD AUTO: 22 % (ref 19.6–45.3)
MCH RBC QN AUTO: 25.7 PG (ref 26.6–33)
MCH RBC QN AUTO: 26 PG (ref 26.6–33)
MCHC RBC AUTO-ENTMCNC: 30.5 G/DL (ref 31.5–35.7)
MCHC RBC AUTO-ENTMCNC: 31.1 G/DL (ref 31.5–35.7)
MCV RBC AUTO: 83.7 FL (ref 79–97)
MCV RBC AUTO: 84.2 FL (ref 79–97)
MONOCYTES # BLD AUTO: 0.45 10*3/MM3 (ref 0.1–0.9)
MONOCYTES # BLD AUTO: 0.53 10*3/MM3 (ref 0.1–0.9)
MONOCYTES NFR BLD AUTO: 4.7 % (ref 5–12)
MONOCYTES NFR BLD AUTO: 5.7 % (ref 5–12)
NEUTROPHILS NFR BLD AUTO: 6.48 10*3/MM3 (ref 1.7–7)
NEUTROPHILS NFR BLD AUTO: 6.94 10*3/MM3 (ref 1.7–7)
NEUTROPHILS NFR BLD AUTO: 70.2 % (ref 42.7–76)
NEUTROPHILS NFR BLD AUTO: 72.4 % (ref 42.7–76)
NITRITE UR QL STRIP: NEGATIVE
NRBC BLD AUTO-RTO: 0 /100 WBC (ref 0–0.2)
NRBC BLD AUTO-RTO: 0 /100 WBC (ref 0–0.2)
PH UR STRIP.AUTO: 7 [PH] (ref 5–8)
PLATELET # BLD AUTO: 228 10*3/MM3 (ref 140–450)
PLATELET # BLD AUTO: 246 10*3/MM3 (ref 140–450)
PMV BLD AUTO: 9.2 FL (ref 6–12)
PMV BLD AUTO: 9.4 FL (ref 6–12)
POTASSIUM SERPL-SCNC: 3.7 MMOL/L (ref 3.5–5.2)
PROT SERPL-MCNC: 6.5 G/DL (ref 6–8.5)
PROT UR QL STRIP: NEGATIVE
RBC # BLD AUTO: 3 10*6/MM3 (ref 3.77–5.28)
RBC # BLD AUTO: 3.23 10*6/MM3 (ref 3.77–5.28)
SARS-COV-2 RNA RESP QL NAA+PROBE: NOT DETECTED
SODIUM SERPL-SCNC: 139 MMOL/L (ref 136–145)
SP GR UR STRIP: 1.02 (ref 1–1.03)
TROPONIN T DELTA: NORMAL
TROPONIN T SERPL HS-MCNC: <6 NG/L
UROBILINOGEN UR QL STRIP: NORMAL
WBC NRBC COR # BLD: 9.24 10*3/MM3 (ref 3.4–10.8)
WBC NRBC COR # BLD: 9.58 10*3/MM3 (ref 3.4–10.8)
WHOLE BLOOD HOLD COAG: NORMAL
WHOLE BLOOD HOLD SPECIMEN: NORMAL

## 2023-10-10 PROCEDURE — 71045 X-RAY EXAM CHEST 1 VIEW: CPT

## 2023-10-10 PROCEDURE — 80053 COMPREHEN METABOLIC PANEL: CPT | Performed by: STUDENT IN AN ORGANIZED HEALTH CARE EDUCATION/TRAINING PROGRAM

## 2023-10-10 PROCEDURE — 93005 ELECTROCARDIOGRAM TRACING: CPT | Performed by: EMERGENCY MEDICINE

## 2023-10-10 PROCEDURE — 85025 COMPLETE CBC W/AUTO DIFF WBC: CPT | Performed by: STUDENT IN AN ORGANIZED HEALTH CARE EDUCATION/TRAINING PROGRAM

## 2023-10-10 PROCEDURE — 25510000001 IOPAMIDOL PER 1 ML: Performed by: EMERGENCY MEDICINE

## 2023-10-10 PROCEDURE — 36415 COLL VENOUS BLD VENIPUNCTURE: CPT

## 2023-10-10 PROCEDURE — 81003 URINALYSIS AUTO W/O SCOPE: CPT | Performed by: EMERGENCY MEDICINE

## 2023-10-10 PROCEDURE — 25810000003 SODIUM CHLORIDE 0.9 % SOLUTION: Performed by: EMERGENCY MEDICINE

## 2023-10-10 PROCEDURE — 87636 SARSCOV2 & INF A&B AMP PRB: CPT | Performed by: EMERGENCY MEDICINE

## 2023-10-10 PROCEDURE — 84484 ASSAY OF TROPONIN QUANT: CPT | Performed by: STUDENT IN AN ORGANIZED HEALTH CARE EDUCATION/TRAINING PROGRAM

## 2023-10-10 PROCEDURE — 85025 COMPLETE CBC W/AUTO DIFF WBC: CPT | Performed by: EMERGENCY MEDICINE

## 2023-10-10 PROCEDURE — 71275 CT ANGIOGRAPHY CHEST: CPT

## 2023-10-10 PROCEDURE — 99285 EMERGENCY DEPT VISIT HI MDM: CPT

## 2023-10-10 PROCEDURE — 84484 ASSAY OF TROPONIN QUANT: CPT | Performed by: EMERGENCY MEDICINE

## 2023-10-10 PROCEDURE — 93005 ELECTROCARDIOGRAM TRACING: CPT | Performed by: STUDENT IN AN ORGANIZED HEALTH CARE EDUCATION/TRAINING PROGRAM

## 2023-10-10 PROCEDURE — 93010 ELECTROCARDIOGRAM REPORT: CPT | Performed by: INTERNAL MEDICINE

## 2023-10-10 PROCEDURE — 83690 ASSAY OF LIPASE: CPT | Performed by: EMERGENCY MEDICINE

## 2023-10-10 RX ORDER — SODIUM CHLORIDE 0.9 % (FLUSH) 0.9 %
10 SYRINGE (ML) INJECTION AS NEEDED
Status: DISCONTINUED | OUTPATIENT
Start: 2023-10-10 | End: 2023-10-11 | Stop reason: HOSPADM

## 2023-10-10 RX ORDER — ASPIRIN 81 MG/1
324 TABLET, CHEWABLE ORAL ONCE
Status: DISCONTINUED | OUTPATIENT
Start: 2023-10-10 | End: 2023-10-11 | Stop reason: HOSPADM

## 2023-10-10 RX ADMIN — IOPAMIDOL 100 ML: 755 INJECTION, SOLUTION INTRAVENOUS at 23:14

## 2023-10-10 RX ADMIN — SODIUM CHLORIDE 1000 ML: 9 INJECTION, SOLUTION INTRAVENOUS at 22:59

## 2023-10-11 VITALS
TEMPERATURE: 98.3 F | HEART RATE: 87 BPM | WEIGHT: 293 LBS | RESPIRATION RATE: 18 BRPM | BODY MASS INDEX: 57.52 KG/M2 | OXYGEN SATURATION: 98 % | DIASTOLIC BLOOD PRESSURE: 64 MMHG | HEIGHT: 60 IN | SYSTOLIC BLOOD PRESSURE: 123 MMHG

## 2023-10-11 RX ORDER — ALBUTEROL SULFATE 90 UG/1
2 AEROSOL, METERED RESPIRATORY (INHALATION) EVERY 6 HOURS PRN
Qty: 1 G | Refills: 0 | Status: SHIPPED | OUTPATIENT
Start: 2023-10-11

## 2023-10-11 RX ORDER — PREDNISONE 20 MG/1
40 TABLET ORAL DAILY
Qty: 10 TABLET | Refills: 0 | Status: SHIPPED | OUTPATIENT
Start: 2023-10-11 | End: 2023-10-16

## 2023-10-13 LAB
QT INTERVAL: 360 MS
QT INTERVAL: 392 MS
QTC INTERVAL: 425 MS
QTC INTERVAL: 440 MS

## 2023-10-18 ENCOUNTER — OFFICE VISIT (OUTPATIENT)
Dept: OBSTETRICS AND GYNECOLOGY | Facility: CLINIC | Age: 38
End: 2023-10-18
Payer: COMMERCIAL

## 2023-10-18 VITALS
BODY MASS INDEX: 57.52 KG/M2 | HEIGHT: 60 IN | DIASTOLIC BLOOD PRESSURE: 80 MMHG | SYSTOLIC BLOOD PRESSURE: 126 MMHG | WEIGHT: 293 LBS

## 2023-10-18 DIAGNOSIS — N93.8 DUB (DYSFUNCTIONAL UTERINE BLEEDING): Primary | ICD-10-CM

## 2023-10-18 DIAGNOSIS — N83.8 OVARIAN MASS, LEFT: ICD-10-CM

## 2023-10-18 DIAGNOSIS — D25.9 UTERINE LEIOMYOMA, UNSPECIFIED LOCATION: ICD-10-CM

## 2023-10-18 DIAGNOSIS — E66.01 MORBID OBESITY WITH BMI OF 60.0-69.9, ADULT: ICD-10-CM

## 2023-10-18 RX ORDER — MELOXICAM 10 MG/1
CAPSULE ORAL
COMMUNITY

## 2023-10-18 RX ORDER — MEDROXYPROGESTERONE ACETATE 5 MG/1
5 TABLET ORAL DAILY
Qty: 90 TABLET | Refills: 3 | Status: SHIPPED | OUTPATIENT
Start: 2023-10-18

## 2023-10-18 RX ORDER — LOSARTAN POTASSIUM 100 MG/1
1 TABLET ORAL DAILY
COMMUNITY
Start: 2023-10-10

## 2023-10-18 RX ORDER — FERROUS SULFATE 325(65) MG
1 TABLET ORAL DAILY
COMMUNITY
Start: 2023-10-12 | End: 2024-10-07

## 2023-10-18 NOTE — PROGRESS NOTES
Chief Complaint   Patient presents with    Gynecologic Exam     Patient is here for evaluation of menorrhagia. Pt also needs appt with MD to discuss EMB results from 1/27/23 per previous note. Pt now on Eliquis due to PE x 4 weeks ago.  Was on period 3 weeks following, very heavy bleeding, didn't result in transfusion. No bleeding since Sunday.  Patient denies current pelvic pain, abnormal vaginal discharge, and voices no other complaints.         History:  Grant Earl is a 38 y.o. female who presents today for follow-up for evaluation of the above:    HPI    Patient is new to me today. Last pap 03/2023 was normal.   Was seen in Jan for DUB with benign EMB at that time but recommended to f/u with MD.   Patient was diagnosed 4 weeks ago with a PE and is now on anticoagulant therapy. Unprovoked PE and will be on anticoagulant for a year. Plans to establish care with hematology.   Reports heavy vaginal bleeding for three weeks after starting eliquis.  Hgb 10/16/23 8.6.  NOT BLEEDING TODAY    She also reports hernia in her lower abdomen that is chronic.           ROS:  Review of Systems   Constitutional: Negative.    HENT: Negative.     Eyes: Negative.    Respiratory: Negative.     Cardiovascular: Negative.    Gastrointestinal: Negative.    Endocrine: Negative.    Genitourinary: Negative.  Positive for menstrual problem and vaginal bleeding.   Musculoskeletal: Negative.    Skin: Negative.    Neurological: Negative.    Psychiatric/Behavioral: Negative.         Ms. Earl  reports that she has never smoked. She has never used smokeless tobacco. She reports current alcohol use of about 2.0 standard drinks of alcohol per week. She reports that she does not use drugs.      Current Outpatient Medications:     acetaminophen (TYLENOL) 325 MG tablet, Take 2 tablets by mouth Every 6 (Six) Hours As Needed for Mild Pain., Disp: 30 tablet, Rfl: 0    albuterol sulfate  (90 Base) MCG/ACT inhaler, Inhale 2 puffs Every 6  "(Six) Hours As Needed for Wheezing., Disp: 1 g, Rfl: 0    apixaban (ELIQUIS) 5 MG tablet tablet, Take 1 tablet by mouth Every 12 (Twelve) Hours. Indications: DVT/PE (active thrombosis), Disp: 60 tablet, Rfl: 0    busPIRone (BUSPAR) 15 MG tablet, Take 1 tablet by mouth 3 (Three) Times a Day., Disp: , Rfl:     citalopram (CeleXA) 10 MG tablet, Take 1 tablet by mouth Daily., Disp: , Rfl:     ferrous sulfate 325 (65 FE) MG tablet, Take 1 tablet by mouth Daily., Disp: , Rfl:     losartan (COZAAR) 100 MG tablet, Take 1 tablet by mouth Daily., Disp: , Rfl:     rosuvastatin (CRESTOR) 20 MG tablet, Take 1 tablet by mouth Every Night., Disp: 30 tablet, Rfl: 0    medroxyPROGESTERone (Provera) 5 MG tablet, Take 1 tablet by mouth Daily., Disp: 90 tablet, Rfl: 3    Meloxicam 10 MG capsule, Take  by mouth. (Patient not taking: Reported on 10/18/2023), Disp: , Rfl:       OBJECTIVE:  /80   Ht 152.4 cm (60\")   Wt (!) 144 kg (318 lb)   LMP 09/24/2023 (Approximate)   BMI 62.11 kg/m²    Physical Exam  Constitutional:       Appearance: She is obese.   Pulmonary:      Effort: No respiratory distress.   Musculoskeletal:      Comments: Difficulty ambulating    Neurological:      Mental Status: She is oriented to person, place, and time.   Psychiatric:         Behavior: Behavior normal.         Assessment/Plan    Diagnoses and all orders for this visit:    1. DUB (dysfunctional uterine bleeding) (Primary)  -     medroxyPROGESTERone (Provera) 5 MG tablet; Take 1 tablet by mouth Daily.  Dispense: 90 tablet; Refill: 3    2. Uterine leiomyoma, unspecified location    3. Ovarian mass, left    4. Morbid obesity with BMI of 60.0-69.9, adult    Patient diagnosed with PE in September 2023. Records review of ER CT imaging from 09/2023 indicate a fat and bowel containing hernia in the lower abdomen as well as enlarging uterine fibroid.   US in the office today also showing complex left ovarian mass.  Discussed IUD but patient declines at this " time. She is willing to start daily progesterone.   Not an ideal candidate for surgical intervention. Discussed she may require referral to Gyn/ Oncology in [place] due to morbid obesity complicating her condition.        An After Visit Summary was printed and given to the patient at discharge.  Return in about 1 month (around 11/18/2023) for with MD. Sooner if problems arise.          Amanda Sevilla APRN. 10/18/2023   Electronically Signed

## 2023-10-27 RX ORDER — NYSTATIN 100000 U/G
CREAM TOPICAL
Qty: 30 G | Refills: 0 | OUTPATIENT
Start: 2023-10-27

## 2023-11-01 ENCOUNTER — HOSPITAL ENCOUNTER (EMERGENCY)
Facility: HOSPITAL | Age: 38
Discharge: HOME | End: 2023-11-01
Attending: STUDENT IN AN ORGANIZED HEALTH CARE EDUCATION/TRAINING PROGRAM | Admitting: STUDENT IN AN ORGANIZED HEALTH CARE EDUCATION/TRAINING PROGRAM
Payer: COMMERCIAL

## 2023-11-01 ENCOUNTER — APPOINTMENT (OUTPATIENT)
Dept: GENERAL RADIOLOGY | Facility: HOSPITAL | Age: 38
End: 2023-11-01
Payer: COMMERCIAL

## 2023-11-01 ENCOUNTER — APPOINTMENT (OUTPATIENT)
Dept: CT IMAGING | Facility: HOSPITAL | Age: 38
End: 2023-11-01
Payer: COMMERCIAL

## 2023-11-01 VITALS
DIASTOLIC BLOOD PRESSURE: 82 MMHG | OXYGEN SATURATION: 100 % | BODY MASS INDEX: 57.52 KG/M2 | HEIGHT: 60 IN | WEIGHT: 293 LBS | SYSTOLIC BLOOD PRESSURE: 142 MMHG | HEART RATE: 72 BPM | RESPIRATION RATE: 16 BRPM | TEMPERATURE: 98.1 F

## 2023-11-01 DIAGNOSIS — N93.8 DYSFUNCTIONAL UTERINE BLEEDING: Primary | ICD-10-CM

## 2023-11-01 LAB
ALBUMIN SERPL-MCNC: 4 G/DL (ref 3.5–5.2)
ALBUMIN/GLOB SERPL: 1.3 G/DL
ALP SERPL-CCNC: 114 U/L (ref 39–117)
ALT SERPL W P-5'-P-CCNC: 54 U/L (ref 1–33)
ANION GAP SERPL CALCULATED.3IONS-SCNC: 9 MMOL/L (ref 5–15)
APTT PPP: 30 SECONDS (ref 24.5–36)
AST SERPL-CCNC: 53 U/L (ref 1–32)
B-HCG UR QL: NEGATIVE
BACTERIA UR QL AUTO: ABNORMAL /HPF
BASOPHILS # BLD AUTO: 0.05 10*3/MM3 (ref 0–0.2)
BASOPHILS NFR BLD AUTO: 0.8 % (ref 0–1.5)
BILIRUB SERPL-MCNC: 0.6 MG/DL (ref 0–1.2)
BILIRUB UR QL STRIP: NEGATIVE
BUN SERPL-MCNC: 12 MG/DL (ref 6–20)
BUN/CREAT SERPL: 21.8 (ref 7–25)
CALCIUM SPEC-SCNC: 8.7 MG/DL (ref 8.6–10.5)
CHLORIDE SERPL-SCNC: 105 MMOL/L (ref 98–107)
CLARITY UR: CLEAR
CO2 SERPL-SCNC: 24 MMOL/L (ref 22–29)
COLOR UR: YELLOW
CREAT SERPL-MCNC: 0.55 MG/DL (ref 0.57–1)
DEPRECATED RDW RBC AUTO: 44.3 FL (ref 37–54)
EGFRCR SERPLBLD CKD-EPI 2021: 119.7 ML/MIN/1.73
EOSINOPHIL # BLD AUTO: 0.11 10*3/MM3 (ref 0–0.4)
EOSINOPHIL NFR BLD AUTO: 1.7 % (ref 0.3–6.2)
ERYTHROCYTE [DISTWIDTH] IN BLOOD BY AUTOMATED COUNT: 14.6 % (ref 12.3–15.4)
GLOBULIN UR ELPH-MCNC: 3 GM/DL
GLUCOSE SERPL-MCNC: 119 MG/DL (ref 65–99)
GLUCOSE UR STRIP-MCNC: NEGATIVE MG/DL
HCT VFR BLD AUTO: 26.8 % (ref 34–46.6)
HGB BLD-MCNC: 8.1 G/DL (ref 12–15.9)
HGB UR QL STRIP.AUTO: ABNORMAL
HYALINE CASTS UR QL AUTO: ABNORMAL /LPF
IMM GRANULOCYTES # BLD AUTO: 0.03 10*3/MM3 (ref 0–0.05)
IMM GRANULOCYTES NFR BLD AUTO: 0.5 % (ref 0–0.5)
INR PPP: 1.3 (ref 0.91–1.09)
KETONES UR QL STRIP: NEGATIVE
LEUKOCYTE ESTERASE UR QL STRIP.AUTO: NEGATIVE
LYMPHOCYTES # BLD AUTO: 1.25 10*3/MM3 (ref 0.7–3.1)
LYMPHOCYTES NFR BLD AUTO: 19.5 % (ref 19.6–45.3)
MCH RBC QN AUTO: 25.2 PG (ref 26.6–33)
MCHC RBC AUTO-ENTMCNC: 30.2 G/DL (ref 31.5–35.7)
MCV RBC AUTO: 83.2 FL (ref 79–97)
MONOCYTES # BLD AUTO: 0.38 10*3/MM3 (ref 0.1–0.9)
MONOCYTES NFR BLD AUTO: 5.9 % (ref 5–12)
NEUTROPHILS NFR BLD AUTO: 4.58 10*3/MM3 (ref 1.7–7)
NEUTROPHILS NFR BLD AUTO: 71.6 % (ref 42.7–76)
NITRITE UR QL STRIP: NEGATIVE
NRBC BLD AUTO-RTO: 0 /100 WBC (ref 0–0.2)
PH UR STRIP.AUTO: 5.5 [PH] (ref 5–8)
PLATELET # BLD AUTO: 276 10*3/MM3 (ref 140–450)
PMV BLD AUTO: 9.4 FL (ref 6–12)
POTASSIUM SERPL-SCNC: 4.2 MMOL/L (ref 3.5–5.2)
PROT SERPL-MCNC: 7 G/DL (ref 6–8.5)
PROT UR QL STRIP: NEGATIVE
PROTHROMBIN TIME: 16.3 SECONDS (ref 11.8–14.8)
RBC # BLD AUTO: 3.22 10*6/MM3 (ref 3.77–5.28)
RBC # UR STRIP: ABNORMAL /HPF
REF LAB TEST METHOD: ABNORMAL
SODIUM SERPL-SCNC: 138 MMOL/L (ref 136–145)
SP GR UR STRIP: 1.02 (ref 1–1.03)
SQUAMOUS #/AREA URNS HPF: ABNORMAL /HPF
TROPONIN T SERPL HS-MCNC: <6 NG/L
UROBILINOGEN UR QL STRIP: ABNORMAL
WBC # UR STRIP: ABNORMAL /HPF
WBC NRBC COR # BLD: 6.4 10*3/MM3 (ref 3.4–10.8)

## 2023-11-01 PROCEDURE — 85025 COMPLETE CBC W/AUTO DIFF WBC: CPT | Performed by: STUDENT IN AN ORGANIZED HEALTH CARE EDUCATION/TRAINING PROGRAM

## 2023-11-01 PROCEDURE — 25510000001 IOPAMIDOL PER 1 ML: Performed by: STUDENT IN AN ORGANIZED HEALTH CARE EDUCATION/TRAINING PROGRAM

## 2023-11-01 PROCEDURE — 80053 COMPREHEN METABOLIC PANEL: CPT | Performed by: STUDENT IN AN ORGANIZED HEALTH CARE EDUCATION/TRAINING PROGRAM

## 2023-11-01 PROCEDURE — 93010 ELECTROCARDIOGRAM REPORT: CPT | Performed by: INTERNAL MEDICINE

## 2023-11-01 PROCEDURE — 71275 CT ANGIOGRAPHY CHEST: CPT

## 2023-11-01 PROCEDURE — 36415 COLL VENOUS BLD VENIPUNCTURE: CPT

## 2023-11-01 PROCEDURE — 93005 ELECTROCARDIOGRAM TRACING: CPT | Performed by: STUDENT IN AN ORGANIZED HEALTH CARE EDUCATION/TRAINING PROGRAM

## 2023-11-01 PROCEDURE — 99285 EMERGENCY DEPT VISIT HI MDM: CPT

## 2023-11-01 PROCEDURE — 85730 THROMBOPLASTIN TIME PARTIAL: CPT | Performed by: STUDENT IN AN ORGANIZED HEALTH CARE EDUCATION/TRAINING PROGRAM

## 2023-11-01 PROCEDURE — 71045 X-RAY EXAM CHEST 1 VIEW: CPT

## 2023-11-01 PROCEDURE — 74177 CT ABD & PELVIS W/CONTRAST: CPT

## 2023-11-01 PROCEDURE — 81025 URINE PREGNANCY TEST: CPT | Performed by: STUDENT IN AN ORGANIZED HEALTH CARE EDUCATION/TRAINING PROGRAM

## 2023-11-01 PROCEDURE — 84484 ASSAY OF TROPONIN QUANT: CPT | Performed by: STUDENT IN AN ORGANIZED HEALTH CARE EDUCATION/TRAINING PROGRAM

## 2023-11-01 PROCEDURE — 85610 PROTHROMBIN TIME: CPT | Performed by: STUDENT IN AN ORGANIZED HEALTH CARE EDUCATION/TRAINING PROGRAM

## 2023-11-01 PROCEDURE — 81001 URINALYSIS AUTO W/SCOPE: CPT | Performed by: STUDENT IN AN ORGANIZED HEALTH CARE EDUCATION/TRAINING PROGRAM

## 2023-11-01 RX ADMIN — IOPAMIDOL 78 ML: 755 INJECTION, SOLUTION INTRAVENOUS at 10:46

## 2023-11-01 NOTE — DISCHARGE INSTRUCTIONS
It was very nice to meet you, Grant. Thank you for allowing us to take care of you today at [facility].    Your evaluation today did not show any emergent findings or have any emergent indications for admission to the hospital.     Please understand that an ER evaluation is just the start of your evaluation. We will do what we can, but we are often unable to fully figure out what is causing your symptoms from one evaluation. Thus, our primary goal is to determine whether you need to be evaluated in the hospital or if it is safe for you to go home and see other doctors such as a primary care physician or a specialist on an outpatient basis.     Like we discussed, it is VERY IMPORTANT that you follow up with your primary care doctor (call them to set up an appointment) within the next few days or as soon as possible so that you can be re-evaluated for improvement in your symptoms or for any other questions.     A copy of your results should be included in your paperwork. If you were prescribed any medications, please take them as directed or call us back with any questions.    Please return to the emergency room within 12-48 hours if you experience fever, chills, chest pain or shortness of breath, pain with inspiration/expiration, pain that travels to your arms, neck or back, nausea, vomiting, severe headache, tearing pain in your chest, dizziness, feel as though you are about to pass out, have any worsening symptoms, or any other concerns.

## 2023-11-02 NOTE — ED PROVIDER NOTES
Subjective   History of Present Illness  Patient states that he has been having some chest pain shortness of breath since last night.  States that she was diagnosed with a PE a few weeks ago and has been taking Eliquis.  States that her menstrual cycles have gotten worse since then.  States that she supposed to follow-up with a specialist to see if she can have a hysterectomy.  States that her appointment is not until next week.  Currently just feels tired.  Denies any hematuria or hematochezia.  Denies any current numbness or tingling.        Review of Systems   All other systems reviewed and are negative.      Past Medical History:   Diagnosis Date    Abdominal hernia     Anemia     Anxiety     Deep vein thrombosis 9/20/2023    Diverticulitis     Fibroid     GERD (gastroesophageal reflux disease)     History of swelling of feet     Ovarian cyst     PE (pulmonary thromboembolism) 09/20/2023    PMS (premenstrual syndrome)     Pulmonary arterial hypertension 9/20/2023       No Known Allergies    Past Surgical History:   Procedure Laterality Date    ADENOIDECTOMY      APPENDECTOMY      CHOLECYSTECTOMY      open    HERNIA REPAIR      x3 living tissue    LAPAROSCOPIC CHOLECYSTECTOMY      LAPAROSCOPIC COLON RESECTION      LYMPH NODE DISSECTION      TONSILLECTOMY         Family History   Problem Relation Age of Onset    Lupus Mother     Osteoporosis Mother     Rheum arthritis Mother     Hypothyroidism Mother     COPD Father     Coronary artery disease Father     Emphysema Father     Hypertension Father     Hyperlipidemia Father     Hypothyroidism Father     Rheum arthritis Brother     Hypereosinophilic syndrome Brother     Crohn's disease Brother     Breast cancer Paternal Aunt     Colon cancer Paternal Aunt     Colon cancer Paternal Aunt     Hypertension Maternal Grandmother     Stroke Maternal Grandmother     Cancer Maternal Grandfather     Aneurysm Paternal Grandmother     Heart disease Paternal Grandfather     Heart  attack Paternal Grandfather     Ovarian cancer Neg Hx     Uterine cancer Neg Hx        Social History     Socioeconomic History    Marital status: Single   Tobacco Use    Smoking status: Never    Smokeless tobacco: Never   Vaping Use    Vaping Use: Never used   Substance and Sexual Activity    Alcohol use: Yes     Alcohol/week: 2.0 standard drinks of alcohol     Types: 1 Glasses of wine, 1 Cans of beer per week     Comment: rarely    Drug use: No    Sexual activity: Not Currently     Partners: Male     Birth control/protection: OCP           Objective   Physical Exam  Vitals and nursing note reviewed.   Constitutional:       General: She is not in acute distress.     Appearance: Normal appearance. She is not toxic-appearing or diaphoretic.   HENT:      Head: Normocephalic and atraumatic.      Right Ear: External ear normal.      Left Ear: External ear normal.      Nose: Nose normal.      Mouth/Throat:      Mouth: Mucous membranes are moist.   Eyes:      General:         Right eye: No discharge.         Left eye: No discharge.      Extraocular Movements: Extraocular movements intact.      Conjunctiva/sclera: Conjunctivae normal.   Cardiovascular:      Rate and Rhythm: Normal rate and regular rhythm.      Pulses: Normal pulses.   Pulmonary:      Effort: Pulmonary effort is normal. No respiratory distress.      Breath sounds: No rhonchi.   Abdominal:      General: Abdomen is flat.      Tenderness: There is abdominal tenderness (suprapubic). There is no guarding or rebound.   Musculoskeletal:         General: No deformity or signs of injury.   Skin:     General: Skin is warm.      Coloration: Skin is not jaundiced.   Neurological:      Mental Status: She is alert and oriented to person, place, and time. Mental status is at baseline.   Psychiatric:         Mood and Affect: Mood normal.         Behavior: Behavior normal.         Thought Content: Thought content normal.         Judgment: Judgment normal.          Procedures           ED Course                                           Medical Decision Making  Grant Earl is a very pleasant 38 y.o. female who presents to the ED for chest pain and shortness of breath and abdominal pain w/ bleeding.     Patient was non-toxic and not-ill appearing on arrival.     Vital signs stable on arrival.     Patient's presentation raises suspicion for differentials including, but not limited to, ACS, PE, PTX, anxiety, anemia.     External (non-ED) record review: none    Given this, Grant was placed on the monitor. Laboratory studies and imaging studies were ordered including cbc, cmp, EKG, troponin, pt/ptt, CTA chest and CT abdomen to evaluate for PTX or PE and to evaluate for diverticulitis.    Labs were reviewed and pertinent for mild anemia although this seems stable and she does not require any transfusion at this time.     Decision rules/scores evaluated: none     On re-evaluation, patient remained hemodynamically stable and appeared to be comfortable and in no acute distress.    Given findings described above, patient's presentation is most likely related to possible chronic blood loss anemia that is being exacerbated with her recent anticoagulation initiation.     At this point, after reviewing the workup, I have a low suspicion for PE or severe intra-abdominal pathology given reassuring CT scans at this time.    I had a thorough discussion regarding the workup, results so far, and my impression so far. I said that she can follow up with her PCP and ob/gyn. I said that based on the current workup, there is not an unstable medical condition requiring admission to the hospital.     I spent an appropriate amount of time necessary at the bedside preceding discharge so I could explain aftercare instructions, provide more patient eduction, give explanations of my interpretation of the evaluation, and to ensure that everyone understood the plan of care. I also discussed the fact  that even after being discharged there could be an acute emergent condition that arises requiring further evaluation, admission, or even surgical intervention.     However, I said that it is VERY IMPORTANT that Grant follows up, by CALLING as soon as possible to set up an appointment, with the primary care doctor within the next few days or as soon as reasonably possible so that the symptoms can be re-evaluated for improvement or for any other questions.     I also gave Grant common sense return precautions and encouraged a quick return to the emergency department within 24 - 48hrs if there are ANY concerns, worsening of condition, new complaints, or if unable to seek follow-up in a timely fashion.     The patient verbalized understanding of the discharge instructions and agreed with them.     Grant was discharged in stable condition.        Signed by:   Socorro Sierra MD 11/2/2023 11:26 CDT   Emergency Medicine Physician    Dragon disclaimer:  Part of this note may be an electronic transcription/translation of spoken language to printed text using the Dragon Dictation System.         Problems Addressed:  Dysfunctional uterine bleeding: complicated acute illness or injury    Amount and/or Complexity of Data Reviewed  Labs: ordered.  Radiology: ordered.  ECG/medicine tests: ordered.    Risk  Prescription drug management.        Final diagnoses:   Dysfunctional uterine bleeding       ED Disposition  ED Disposition       ED Disposition   Discharge    Condition   Stable    Comment   --               Douglas Carnes MD  [address] [facility] [address] [phone]    Call in 1 day  As needed, If symptoms worsen         Medication List      No changes were made to your prescriptions during this visit.            Socorro Sierra MD  11/02/23 5338

## 2023-11-03 LAB
QT INTERVAL: 386 MS
QTC INTERVAL: 450 MS

## 2023-11-16 ENCOUNTER — TELEPHONE (OUTPATIENT)
Dept: OBSTETRICS AND GYNECOLOGY | Facility: CLINIC | Age: 38
End: 2023-11-16
Payer: COMMERCIAL

## 2023-11-16 NOTE — TELEPHONE ENCOUNTER
Pt left voicemail for sooner appt than 12/1 as scheduled with Dr. Mejia.  I called to see if she could do 1500 for u/s and 1530 with Dr. Cheney on 11/22, no answer, left voicemail to return my call at her earliest convenience.

## 2023-11-16 NOTE — TELEPHONE ENCOUNTER
Pt returned call, spoke to Yuko and wanted the above mentioned appt with Dr. Cheney. Scheduled appt, called and informed pt, pt voiced understanding.

## 2023-11-22 ENCOUNTER — OFFICE VISIT (OUTPATIENT)
Dept: OBSTETRICS AND GYNECOLOGY | Facility: CLINIC | Age: 38
End: 2023-11-22
Payer: COMMERCIAL

## 2023-11-22 VITALS
BODY MASS INDEX: 57.52 KG/M2 | DIASTOLIC BLOOD PRESSURE: 78 MMHG | WEIGHT: 293 LBS | HEIGHT: 60 IN | SYSTOLIC BLOOD PRESSURE: 124 MMHG

## 2023-11-22 DIAGNOSIS — D25.9 UTERINE LEIOMYOMA, UNSPECIFIED LOCATION: Primary | ICD-10-CM

## 2023-11-22 DIAGNOSIS — I26.99 OTHER PULMONARY EMBOLISM WITHOUT ACUTE COR PULMONALE, UNSPECIFIED CHRONICITY: ICD-10-CM

## 2023-11-22 DIAGNOSIS — E66.01 MORBID OBESITY WITH BMI OF 60.0-69.9, ADULT: ICD-10-CM

## 2023-11-22 DIAGNOSIS — N93.8 DUB (DYSFUNCTIONAL UTERINE BLEEDING): ICD-10-CM

## 2023-11-22 RX ORDER — CYCLOBENZAPRINE HCL 10 MG
1 TABLET ORAL NIGHTLY
COMMUNITY
Start: 2023-11-15 | End: 2024-02-14

## 2023-11-22 NOTE — PROGRESS NOTES
"Chief Complaint  Menstrual Problem (Pt here for surgical consult for hysterectomy due to uterine fibroids and DUB that has worsened over the last two years, had EMB done 01/27/2023 that was benign but was a limited sample, last pap 03/06/2023 negative cotesting, pt is on Eliquis due to hx DVT/PE in September 2023)    Subjective        Grant Earl presents to [facility]  History of Present Illness    Patient presents today for surgical consultation  She is a 38-year-old who was recently diagnosed with a pulmonary embolism  She was started on Eliquis  She is uncertain as to how long she will need to take that  Since starting on Eliquis, she has had heavy vaginal bleeding resulting in anemia  She also has some pain associated with this  Serial ultrasound examinations have shown interval increases in the size of uterine fibroids  She did have an endometrial biopsy earlier this year which was benign  Her surgical comorbidities include morbid obesity with a BMI of over 60, and a previous large ventral hernia repair performed open with mesh  We discussed her BMI being a major risk factor from a perioperative standpoint and that she would best be cared for in a tertiary care center  She is agreeable to consultation and referral      Objective   Vital Signs:  /78 (BP Location: Left arm, Patient Position: Sitting, Cuff Size: Large Adult)   Ht 152.4 cm (60\")   Wt (!) 142 kg (314 lb)   BMI 61.32 kg/m²   Estimated body mass index is 61.32 kg/m² as calculated from the following:    Height as of this encounter: 152.4 cm (60\").    Weight as of this encounter: 142 kg (314 lb).               Physical Exam  Vitals and nursing note reviewed. Exam conducted with a chaperone present.   Constitutional:       Appearance: Normal appearance. She is obese.   HENT:      Head: Normocephalic and atraumatic.   Abdominal:      General: Abdomen is flat. Bowel sounds are normal.      Palpations: Abdomen is soft. "   Musculoskeletal:         General: Normal range of motion.      Cervical back: Normal range of motion and neck supple.   Skin:     General: Skin is warm and dry.   Neurological:      General: No focal deficit present.      Mental Status: She is alert and oriented to person, place, and time. Mental status is at baseline.   Psychiatric:         Mood and Affect: Mood normal.         Behavior: Behavior normal.         Thought Content: Thought content normal.         Judgment: Judgment normal.        Result Review :                   Assessment and Plan   Diagnoses and all orders for this visit:    1. Uterine leiomyoma, unspecified location (Primary)    2. DUB (dysfunctional uterine bleeding)    3. Morbid obesity with BMI of 60.0-69.9, adult    4. Other pulmonary embolism without acute cor pulmonale, unspecified chronicity             Follow Up   No follow-ups on file.  Patient was given instructions and counseling regarding her condition or for health maintenance advice. Please see specific information pulled into the AVS if appropriate.     Referral to Dr. Culver in [place], [place]    Gil Cheney MD

## 2024-01-09 ENCOUNTER — TRANSCRIBE ORDERS (OUTPATIENT)
Dept: ADMINISTRATIVE | Facility: HOSPITAL | Age: 39
End: 2024-01-09
Payer: COMMERCIAL

## 2024-01-09 DIAGNOSIS — D25.9 UTERINE LEIOMYOMA, UNSPECIFIED LOCATION: ICD-10-CM

## 2024-01-09 DIAGNOSIS — N93.9 ABNORMAL UTERINE BLEEDING: Primary | ICD-10-CM

## 2024-01-20 ENCOUNTER — APPOINTMENT (OUTPATIENT)
Dept: CT IMAGING | Facility: HOSPITAL | Age: 39
End: 2024-01-20
Payer: COMMERCIAL

## 2024-01-20 ENCOUNTER — APPOINTMENT (OUTPATIENT)
Dept: GENERAL RADIOLOGY | Facility: HOSPITAL | Age: 39
End: 2024-01-20
Payer: COMMERCIAL

## 2024-01-20 ENCOUNTER — HOSPITAL ENCOUNTER (EMERGENCY)
Facility: HOSPITAL | Age: 39
Discharge: HOME | End: 2024-01-20
Attending: STUDENT IN AN ORGANIZED HEALTH CARE EDUCATION/TRAINING PROGRAM
Payer: COMMERCIAL

## 2024-01-20 VITALS
BODY MASS INDEX: 57.52 KG/M2 | DIASTOLIC BLOOD PRESSURE: 89 MMHG | RESPIRATION RATE: 18 BRPM | OXYGEN SATURATION: 99 % | WEIGHT: 293 LBS | HEART RATE: 65 BPM | HEIGHT: 60 IN | TEMPERATURE: 98.4 F | SYSTOLIC BLOOD PRESSURE: 177 MMHG

## 2024-01-20 DIAGNOSIS — R07.9 CHEST PAIN, UNSPECIFIED TYPE: Primary | ICD-10-CM

## 2024-01-20 LAB
ALBUMIN SERPL-MCNC: 4.3 G/DL (ref 3.5–5.2)
ALBUMIN/GLOB SERPL: 1.3 G/DL
ALP SERPL-CCNC: 131 U/L (ref 39–117)
ALT SERPL W P-5'-P-CCNC: 42 U/L (ref 1–33)
ANION GAP SERPL CALCULATED.3IONS-SCNC: 10 MMOL/L (ref 5–15)
AST SERPL-CCNC: 33 U/L (ref 1–32)
BASOPHILS # BLD AUTO: 0.04 10*3/MM3 (ref 0–0.2)
BASOPHILS NFR BLD AUTO: 0.6 % (ref 0–1.5)
BILIRUB SERPL-MCNC: 0.5 MG/DL (ref 0–1.2)
BUN SERPL-MCNC: 9 MG/DL (ref 6–20)
BUN/CREAT SERPL: 15.8 (ref 7–25)
CALCIUM SPEC-SCNC: 9.4 MG/DL (ref 8.6–10.5)
CHLORIDE SERPL-SCNC: 103 MMOL/L (ref 98–107)
CO2 SERPL-SCNC: 26 MMOL/L (ref 22–29)
CREAT SERPL-MCNC: 0.57 MG/DL (ref 0.57–1)
DEPRECATED RDW RBC AUTO: 59.4 FL (ref 37–54)
EGFRCR SERPLBLD CKD-EPI 2021: 118.7 ML/MIN/1.73
EOSINOPHIL # BLD AUTO: 0.12 10*3/MM3 (ref 0–0.4)
EOSINOPHIL NFR BLD AUTO: 1.7 % (ref 0.3–6.2)
ERYTHROCYTE [DISTWIDTH] IN BLOOD BY AUTOMATED COUNT: 21.9 % (ref 12.3–15.4)
GLOBULIN UR ELPH-MCNC: 3.2 GM/DL
GLUCOSE SERPL-MCNC: 94 MG/DL (ref 65–99)
HCG SERPL QL: NEGATIVE
HCT VFR BLD AUTO: 39.4 % (ref 34–46.6)
HGB BLD-MCNC: 12.1 G/DL (ref 12–15.9)
IMM GRANULOCYTES # BLD AUTO: 0.02 10*3/MM3 (ref 0–0.05)
IMM GRANULOCYTES NFR BLD AUTO: 0.3 % (ref 0–0.5)
LYMPHOCYTES # BLD AUTO: 1.53 10*3/MM3 (ref 0.7–3.1)
LYMPHOCYTES NFR BLD AUTO: 22 % (ref 19.6–45.3)
MAGNESIUM SERPL-MCNC: 2 MG/DL (ref 1.6–2.6)
MCH RBC QN AUTO: 23.9 PG (ref 26.6–33)
MCHC RBC AUTO-ENTMCNC: 30.7 G/DL (ref 31.5–35.7)
MCV RBC AUTO: 77.7 FL (ref 79–97)
MONOCYTES # BLD AUTO: 0.35 10*3/MM3 (ref 0.1–0.9)
MONOCYTES NFR BLD AUTO: 5 % (ref 5–12)
NEUTROPHILS NFR BLD AUTO: 4.88 10*3/MM3 (ref 1.7–7)
NEUTROPHILS NFR BLD AUTO: 70.4 % (ref 42.7–76)
NRBC BLD AUTO-RTO: 0 /100 WBC (ref 0–0.2)
PLATELET # BLD AUTO: 266 10*3/MM3 (ref 140–450)
PMV BLD AUTO: 8.8 FL (ref 6–12)
POTASSIUM SERPL-SCNC: 4.2 MMOL/L (ref 3.5–5.2)
PROT SERPL-MCNC: 7.5 G/DL (ref 6–8.5)
RBC # BLD AUTO: 5.07 10*6/MM3 (ref 3.77–5.28)
SODIUM SERPL-SCNC: 139 MMOL/L (ref 136–145)
TROPONIN T SERPL HS-MCNC: <6 NG/L
WBC NRBC COR # BLD AUTO: 6.94 10*3/MM3 (ref 3.4–10.8)

## 2024-01-20 PROCEDURE — 93010 ELECTROCARDIOGRAM REPORT: CPT | Performed by: INTERNAL MEDICINE

## 2024-01-20 PROCEDURE — 93005 ELECTROCARDIOGRAM TRACING: CPT

## 2024-01-20 PROCEDURE — 83735 ASSAY OF MAGNESIUM: CPT | Performed by: STUDENT IN AN ORGANIZED HEALTH CARE EDUCATION/TRAINING PROGRAM

## 2024-01-20 PROCEDURE — 25510000001 IOPAMIDOL PER 1 ML: Performed by: STUDENT IN AN ORGANIZED HEALTH CARE EDUCATION/TRAINING PROGRAM

## 2024-01-20 PROCEDURE — 85025 COMPLETE CBC W/AUTO DIFF WBC: CPT | Performed by: STUDENT IN AN ORGANIZED HEALTH CARE EDUCATION/TRAINING PROGRAM

## 2024-01-20 PROCEDURE — 36415 COLL VENOUS BLD VENIPUNCTURE: CPT

## 2024-01-20 PROCEDURE — 93005 ELECTROCARDIOGRAM TRACING: CPT | Performed by: STUDENT IN AN ORGANIZED HEALTH CARE EDUCATION/TRAINING PROGRAM

## 2024-01-20 PROCEDURE — 71046 X-RAY EXAM CHEST 2 VIEWS: CPT

## 2024-01-20 PROCEDURE — 99285 EMERGENCY DEPT VISIT HI MDM: CPT

## 2024-01-20 PROCEDURE — 71275 CT ANGIOGRAPHY CHEST: CPT

## 2024-01-20 PROCEDURE — 84484 ASSAY OF TROPONIN QUANT: CPT | Performed by: STUDENT IN AN ORGANIZED HEALTH CARE EDUCATION/TRAINING PROGRAM

## 2024-01-20 PROCEDURE — 70450 CT HEAD/BRAIN W/O DYE: CPT

## 2024-01-20 PROCEDURE — 80053 COMPREHEN METABOLIC PANEL: CPT | Performed by: STUDENT IN AN ORGANIZED HEALTH CARE EDUCATION/TRAINING PROGRAM

## 2024-01-20 PROCEDURE — 84703 CHORIONIC GONADOTROPIN ASSAY: CPT | Performed by: STUDENT IN AN ORGANIZED HEALTH CARE EDUCATION/TRAINING PROGRAM

## 2024-01-20 RX ADMIN — IOPAMIDOL 100 ML: 755 INJECTION, SOLUTION INTRAVENOUS at 15:19

## 2024-01-20 NOTE — ED PROVIDER NOTES
Subjective   History of Present Illness  Patient presents with multiple complaints.  Outlined per complaint below.    Night sweats: Lasting for 2 weeks.  Had a similar thing 10 years ago that nobody ever figured out.  Had hives with that then, does not now.  No fevers.  Has a scant nonproductive morning cough, no cough otherwise.  No recent travel.    Pricks in the back of her head: At night, sometimes she will wake up, with burning type pricking sensation in the back of her head.  Not severe.  Wakes her up every night.    Trouble walking: Ever since her diagnosis of pulmonary embolus in September of last year.  Occurs off-and-on.  She will have to steady herself and has trouble walking.  Not experiencing it now.  Has not changed at all since September.    Chest pain: Sharp.  In the center of her chest.  Moderate.  Constant for 2 days.  Does not change with exertion or rest.  Nothing is changed at all.  Has not tried taking any medications for it.  Does not make her diaphoretic nauseous or lightheaded or pass out.  Says she had a stress test within the past couple years that was normal.    Shortness of breath: Off-and-on.  Present ever since her blood clot in September.  Not worse currently than usual.  Not constant.    Hypertension: She notes her blood pressure has been high for the past couple days.  Usually it is very well-controlled with her blood pressure medication.  She was never hypertensive before pulmonary embolus and then it caused hypertension.    Headache: 2 days.  Across her forehead.  Worse over her left eye.  Occurs with stress.  Typical for headache for her.  Not severe.  Has not tried taking anything for it.  No numbness or tingling or weakness in her arms or legs.    Has a history of hypertension, no history of hyperlipidemia or diabetes.  Family history of CAD in her father.    She has some other concerns about her mental status.  She says occasionally she will see little white dots in her vision  that go away.  No decrease in her vision, flashers, cuts in her vision, or visual loss.  Started noticing it a couple days ago but has had it off and on in the past.  She also notes some cognitive changes including she has been losing in whereas normally she winds and feels like this is because she is not thinking straight.   Nick laughlin to her kids did today she also called the shoulder a cabinet and is concerned about her mixup of words.    Review of Systems   Constitutional:  Negative for chills and fever.   Respiratory:  Positive for cough and shortness of breath.    Cardiovascular:  Positive for chest pain. Negative for palpitations.   Gastrointestinal:  Negative for abdominal pain and vomiting.   Genitourinary:  Negative for difficulty urinating and dysuria.   Neurological:  Negative for syncope and light-headedness.       Past Medical History:   Diagnosis Date    Abdominal hernia     Anemia     Anxiety     Deep vein thrombosis 9/20/2023    Diverticulitis     Essential hypertension 09/21/2023    Fibroid     GERD (gastroesophageal reflux disease)     History of swelling of feet     Ovarian cyst     PE (pulmonary thromboembolism) 09/20/2023    PMS (premenstrual syndrome)     Pulmonary arterial hypertension 9/20/2023    Pulmonary emboli 09/20/2023       No Known Allergies    Past Surgical History:   Procedure Laterality Date    ADENOIDECTOMY      APPENDECTOMY      CHOLECYSTECTOMY      open    HERNIA REPAIR      x3 living tissue    LAPAROSCOPIC CHOLECYSTECTOMY      LAPAROSCOPIC COLON RESECTION      LYMPH NODE DISSECTION      TONSILLECTOMY         Family History   Problem Relation Age of Onset    COPD Father     Coronary artery disease Father     Emphysema Father     Hypertension Father     Hyperlipidemia Father     Hypothyroidism Father     Lupus Mother     Osteoporosis Mother     Rheum arthritis Mother     Hypothyroidism Mother     Pancreatic cancer Mother     Rheum arthritis Brother     Hypereosinophilic syndrome  Brother     Crohn's disease Brother     Heart disease Paternal Grandfather     Heart attack Paternal Grandfather     Aneurysm Paternal Grandmother     Hypertension Maternal Grandmother     Stroke Maternal Grandmother     Cancer Maternal Grandfather     Breast cancer Paternal Aunt     Colon cancer Paternal Aunt     Colon cancer Paternal Aunt     Ovarian cancer Neg Hx     Uterine cancer Neg Hx        Social History     Socioeconomic History    Marital status: Single   Tobacco Use    Smoking status: Never     Passive exposure: Never    Smokeless tobacco: Never   Vaping Use    Vaping Use: Never used   Substance and Sexual Activity    Alcohol use: Yes     Alcohol/week: 2.0 standard drinks of alcohol     Types: 1 Glasses of wine, 1 Cans of beer per week     Comment: rarely    Drug use: No    Sexual activity: Not Currently     Partners: Male     Birth control/protection: None           Objective   Physical Exam  Vitals reviewed.   Constitutional:       General: She is not in acute distress.  HENT:      Head: Normocephalic and atraumatic.   Eyes:      Extraocular Movements: Extraocular movements intact.      Conjunctiva/sclera: Conjunctivae normal.   Cardiovascular:      Pulses: Normal pulses.      Heart sounds: Normal heart sounds.   Pulmonary:      Effort: Pulmonary effort is normal. No respiratory distress.      Breath sounds: No wheezing.   Abdominal:      General: Abdomen is flat. There is no distension.      Tenderness: There is no abdominal tenderness. There is no guarding.   Musculoskeletal:      Cervical back: Normal range of motion and neck supple.      Right lower leg: Tenderness (chronic and unchanged) present. No edema.      Left lower leg: Tenderness (chronic and unchanged) present. No edema.   Skin:     General: Skin is warm and dry.      Capillary Refill: Capillary refill takes less than 2 seconds.   Neurological:      General: No focal deficit present.      Mental Status: She is alert. Mental status is at  baseline.      Comments: Right upper extremity: 5/5 strength with handgrip and flexion/extension of shoulders, elbows.   Light touch sensation intact and equal when compared to the left upper extremity.    Left upper extremity: 5/5 strength with handgrip and flexion/extension of shoulders, elbows.   Light touch sensation intact and equal when compared to the right upper extremity.    Right lower extremity: 5/5 strength with flexion/extension of hips, knees, and dorsi/plantarflexion of ankles. Able to wiggle toes.   Light touch sensation intact and equal when compared to the left lower extremity.    Left lower extremity: 5/5 strength with flexion/extension of hips, knees, and dorsi/plantarflexion of ankles. Able to wiggle toes.   Light touch sensation intact and equal when compared to the right lower extremity.    Light sensation intact in bilateral face. CN 2-12 normal. Gait normal without ataxia or limp.    Psychiatric:         Behavior: Behavior normal.         Thought Content: Thought content normal.         Procedures           ED Course  ED Course as of 01/20/24 1635   Sat Jan 20, 2024   1511 -chest x-ray: no focal consolidations, no pulmonary edema, mediastinum not widened.    -Laboratory studies reviewed by me and are notable for no focal abnormality. [AS]   1604 HEART score 2 there is not indicaiton for repeat troponin [AS]   1633 Discussed michael for close f/u on BP outpt pt understands and agrees [AS]      ED Course User Index  [AS] Agustín Melendrez MD                                             Medical Decision Making  Problems Addressed:  Chest pain, unspecified type: complicated acute illness or injury    Amount and/or Complexity of Data Reviewed  Labs: ordered.  Radiology: ordered.  ECG/medicine tests: ordered.    Risk  Prescription drug management.      Grant Earl is a 38 y.o. female with PMH above who presents to the Emergency Department with multiple complaints.  Consider ACS.  Less  likely stroke.  History physical is without focal emergent finding.  Repeat onset of PE is also possible.  Workup ordered.  She does not anything for pain currently.     ED Course:   -See ED course above.  CAT scan imaging without any acute emergent finding.  Continues to have a reassuring history physical.  Feels satisfied and well on reassessment.  Unclear etiology of her symptoms and discussed close outpatient primary care follow-up.  Her workup, history physical are not consistent with ACS, PE, dissection, aneurysmal process, subarachnoid hemorrhage, meningitis, occult infection, bacteremia, sepsis, or another emergent process.      Final diagnosis: chest pain, headache    All questions answered. Patient/family was understanding and in agreement with today's assessment and plan. The patient was monitored during their stay in the ED and dispositioned without acute event.    Electronically signed by:  Agustín Melendrez MD 1/20/2024 16:35 CST      Note: Dragon medical dictation software was used in the creation of this note.      Final diagnoses:   Chest pain, unspecified type       ED Disposition  ED Disposition       ED Disposition   Discharge    Condition   Stable    Comment   --               Douglas Carnes MD  [address] [place] [address] [address] [facility] [id number] [phone]               Medication List      No changes were made to your prescriptions during this visit.            Agustín Melendrez MD  01/20/24 1844       Agustín Melendrez MD  01/20/24 6338

## 2024-01-20 NOTE — DISCHARGE INSTRUCTIONS
Please return if your chest pain severely worsens, if you become lightheaded or pass out, if you have severe shortness of breath, visual changes, passing out,  or for any other emergent concerns. Otherwise please see your primary care doctor as soon as possible for repeat evaluation and consideration of more testing.

## 2024-01-22 LAB
QT INTERVAL: 388 MS
QTC INTERVAL: 427 MS

## 2024-01-24 ENCOUNTER — HOSPITAL ENCOUNTER (OUTPATIENT)
Dept: MRI IMAGING | Facility: HOSPITAL | Age: 39
Discharge: HOME | End: 2024-01-24
Admitting: OBSTETRICS & GYNECOLOGY
Payer: COMMERCIAL

## 2024-01-24 DIAGNOSIS — D25.9 UTERINE LEIOMYOMA, UNSPECIFIED LOCATION: ICD-10-CM

## 2024-01-24 DIAGNOSIS — N93.9 ABNORMAL UTERINE BLEEDING: ICD-10-CM

## 2024-01-24 PROCEDURE — A9577 INJ MULTIHANCE: HCPCS | Performed by: OBSTETRICS & GYNECOLOGY

## 2024-01-24 PROCEDURE — 0 GADOBENATE DIMEGLUMINE 529 MG/ML SOLUTION: Performed by: OBSTETRICS & GYNECOLOGY

## 2024-01-24 PROCEDURE — 72197 MRI PELVIS W/O & W/DYE: CPT

## 2024-01-24 RX ADMIN — GADOBENATE DIMEGLUMINE 20 ML: 529 INJECTION, SOLUTION INTRAVENOUS at 17:51

## 2024-02-21 ENCOUNTER — TELEPHONE (OUTPATIENT)
Dept: OBSTETRICS AND GYNECOLOGY | Age: 39
End: 2024-02-21
Payer: COMMERCIAL

## 2024-02-21 NOTE — TELEPHONE ENCOUNTER
Caller: ARIELA    [facility]    Best call back number: [phone]-DIRECT LINE. CLAIM NUMBER:[id number]    What form or medical record are you requesting: CONFIRM NARITIVE LETTER FOR PATIENT'S CARE WAS RECEIVED VIA FAX ON 02/20/24    Who is requesting this form or medical record from you: THE [facility]    How would you like to receive the form or medical records (pick-up, mail, fax): FAX  If fax, what is the fax number: [phone]    Timeframe paperwork needed: AS SOON AS POSSIBLE.    Additional notes: ARIELA IS REQUESTING A CALL BACK TO CONFIRM LETTER WAS RECEIVED.

## 2024-02-22 NOTE — TELEPHONE ENCOUNTER
Called to inform Marika that we do not have papers but may need to call Dr. Culver, no answer, left voicemail to return my call at her earliest convenience.

## 2024-02-22 NOTE — TELEPHONE ENCOUNTER
Spoke with Marika and informed her that we did not receive anything but we are not the office that would have her on disability.  She informed me that she would need to see if we treated her within a certain date range.  I informed her if she faxed the request over we would be happy to fill that out, voiced understanding.

## 2024-03-05 ENCOUNTER — HOSPITAL ENCOUNTER (EMERGENCY)
Facility: HOSPITAL | Age: 39
Discharge: HOME | End: 2024-03-05
Admitting: EMERGENCY MEDICINE
Payer: COMMERCIAL

## 2024-03-05 ENCOUNTER — APPOINTMENT (OUTPATIENT)
Dept: GENERAL RADIOLOGY | Facility: HOSPITAL | Age: 39
End: 2024-03-05
Payer: COMMERCIAL

## 2024-03-05 ENCOUNTER — APPOINTMENT (OUTPATIENT)
Dept: CT IMAGING | Facility: HOSPITAL | Age: 39
End: 2024-03-05
Payer: COMMERCIAL

## 2024-03-05 VITALS
HEART RATE: 73 BPM | DIASTOLIC BLOOD PRESSURE: 66 MMHG | HEIGHT: 61 IN | RESPIRATION RATE: 20 BRPM | BODY MASS INDEX: 55.32 KG/M2 | TEMPERATURE: 97.9 F | OXYGEN SATURATION: 100 % | SYSTOLIC BLOOD PRESSURE: 129 MMHG | WEIGHT: 293 LBS

## 2024-03-05 DIAGNOSIS — R07.9 CHEST PAIN, UNSPECIFIED TYPE: Primary | ICD-10-CM

## 2024-03-05 DIAGNOSIS — K76.0 HEPATIC STEATOSIS: ICD-10-CM

## 2024-03-05 LAB
ALBUMIN SERPL-MCNC: 4.2 G/DL (ref 3.5–5.2)
ALBUMIN/GLOB SERPL: 1.4 G/DL
ALP SERPL-CCNC: 119 U/L (ref 39–117)
ALT SERPL W P-5'-P-CCNC: 28 U/L (ref 1–33)
ANION GAP SERPL CALCULATED.3IONS-SCNC: 11 MMOL/L (ref 5–15)
AST SERPL-CCNC: 24 U/L (ref 1–32)
B-HCG UR QL: NEGATIVE
BASOPHILS # BLD AUTO: 0.05 10*3/MM3 (ref 0–0.2)
BASOPHILS NFR BLD AUTO: 0.6 % (ref 0–1.5)
BILIRUB SERPL-MCNC: 0.4 MG/DL (ref 0–1.2)
BUN SERPL-MCNC: 12 MG/DL (ref 6–20)
BUN/CREAT SERPL: 19 (ref 7–25)
CALCIUM SPEC-SCNC: 9 MG/DL (ref 8.6–10.5)
CHLORIDE SERPL-SCNC: 102 MMOL/L (ref 98–107)
CO2 SERPL-SCNC: 24 MMOL/L (ref 22–29)
CREAT SERPL-MCNC: 0.63 MG/DL (ref 0.57–1)
DEPRECATED RDW RBC AUTO: 55.3 FL (ref 37–54)
EGFRCR SERPLBLD CKD-EPI 2021: 115.9 ML/MIN/1.73
EOSINOPHIL # BLD AUTO: 0.13 10*3/MM3 (ref 0–0.4)
EOSINOPHIL NFR BLD AUTO: 1.5 % (ref 0.3–6.2)
ERYTHROCYTE [DISTWIDTH] IN BLOOD BY AUTOMATED COUNT: 19.9 % (ref 12.3–15.4)
EXPIRATION DATE: NORMAL
GEN 5 2HR TROPONIN T REFLEX: <6 NG/L
GLOBULIN UR ELPH-MCNC: 3.1 GM/DL
GLUCOSE SERPL-MCNC: 102 MG/DL (ref 65–99)
HCT VFR BLD AUTO: 36.8 % (ref 34–46.6)
HGB BLD-MCNC: 11.7 G/DL (ref 12–15.9)
IMM GRANULOCYTES # BLD AUTO: 0.03 10*3/MM3 (ref 0–0.05)
IMM GRANULOCYTES NFR BLD AUTO: 0.3 % (ref 0–0.5)
INTERNAL NEGATIVE CONTROL: NEGATIVE
INTERNAL POSITIVE CONTROL: POSITIVE
LIPASE SERPL-CCNC: 16 U/L (ref 13–60)
LYMPHOCYTES # BLD AUTO: 2.06 10*3/MM3 (ref 0.7–3.1)
LYMPHOCYTES NFR BLD AUTO: 23.3 % (ref 19.6–45.3)
Lab: NORMAL
MCH RBC QN AUTO: 25 PG (ref 26.6–33)
MCHC RBC AUTO-ENTMCNC: 31.8 G/DL (ref 31.5–35.7)
MCV RBC AUTO: 78.6 FL (ref 79–97)
MONOCYTES # BLD AUTO: 0.52 10*3/MM3 (ref 0.1–0.9)
MONOCYTES NFR BLD AUTO: 5.9 % (ref 5–12)
NEUTROPHILS NFR BLD AUTO: 6.07 10*3/MM3 (ref 1.7–7)
NEUTROPHILS NFR BLD AUTO: 68.4 % (ref 42.7–76)
NRBC BLD AUTO-RTO: 0 /100 WBC (ref 0–0.2)
PLATELET # BLD AUTO: 270 10*3/MM3 (ref 140–450)
PMV BLD AUTO: 9.1 FL (ref 6–12)
POTASSIUM SERPL-SCNC: 3.9 MMOL/L (ref 3.5–5.2)
PROT SERPL-MCNC: 7.3 G/DL (ref 6–8.5)
RBC # BLD AUTO: 4.68 10*6/MM3 (ref 3.77–5.28)
SODIUM SERPL-SCNC: 137 MMOL/L (ref 136–145)
TROPONIN T DELTA: NORMAL
TROPONIN T SERPL HS-MCNC: <6 NG/L
WBC NRBC COR # BLD AUTO: 8.86 10*3/MM3 (ref 3.4–10.8)

## 2024-03-05 PROCEDURE — 81025 URINE PREGNANCY TEST: CPT | Performed by: NURSE PRACTITIONER

## 2024-03-05 PROCEDURE — 93010 ELECTROCARDIOGRAM REPORT: CPT | Performed by: INTERNAL MEDICINE

## 2024-03-05 PROCEDURE — 36415 COLL VENOUS BLD VENIPUNCTURE: CPT

## 2024-03-05 PROCEDURE — 99285 EMERGENCY DEPT VISIT HI MDM: CPT

## 2024-03-05 PROCEDURE — 85025 COMPLETE CBC W/AUTO DIFF WBC: CPT | Performed by: NURSE PRACTITIONER

## 2024-03-05 PROCEDURE — 25510000001 IOPAMIDOL PER 1 ML: Performed by: NURSE PRACTITIONER

## 2024-03-05 PROCEDURE — 83690 ASSAY OF LIPASE: CPT | Performed by: NURSE PRACTITIONER

## 2024-03-05 PROCEDURE — 80053 COMPREHEN METABOLIC PANEL: CPT | Performed by: NURSE PRACTITIONER

## 2024-03-05 PROCEDURE — 84484 ASSAY OF TROPONIN QUANT: CPT | Performed by: NURSE PRACTITIONER

## 2024-03-05 PROCEDURE — 71275 CT ANGIOGRAPHY CHEST: CPT

## 2024-03-05 PROCEDURE — 93005 ELECTROCARDIOGRAM TRACING: CPT

## 2024-03-05 PROCEDURE — 71045 X-RAY EXAM CHEST 1 VIEW: CPT

## 2024-03-05 PROCEDURE — 93005 ELECTROCARDIOGRAM TRACING: CPT | Performed by: NURSE PRACTITIONER

## 2024-03-05 RX ORDER — NITROGLYCERIN 0.4 MG/1
0.4 TABLET SUBLINGUAL
Status: COMPLETED | OUTPATIENT
Start: 2024-03-05 | End: 2024-03-05

## 2024-03-05 RX ORDER — SODIUM CHLORIDE 0.9 % (FLUSH) 0.9 %
10 SYRINGE (ML) INJECTION AS NEEDED
Status: DISCONTINUED | OUTPATIENT
Start: 2024-03-05 | End: 2024-03-06 | Stop reason: HOSPADM

## 2024-03-05 RX ADMIN — IOPAMIDOL 75 ML: 755 INJECTION, SOLUTION INTRAVENOUS at 20:32

## 2024-03-05 RX ADMIN — NITROGLYCERIN 0.4 MG: 0.4 TABLET SUBLINGUAL at 19:40

## 2024-03-06 NOTE — ED PROVIDER NOTES
Subjective   History of Present Illness  Patient is a 38-year-old female who presents to the ER with chief complaints of chest pain.  She states that she began developing chest pain around 1:30 PM this afternoon after eating lunch.  She states that she was walking out to her vehicle when she began developing what she describes as substernal chest pressure and pain to the left shoulder.  She felt the pain radiated into her neck as well.  She reports having chronic shortness of breath due to previous history of PE.  Patient has had nausea without any vomiting.  She is on Eliquis for previous history of PE.  Patient states that she laid down when she got home to take a nap and all throughout her nap she was having chest pain and a headache.  She states when she woke up she was having worsening pain and reports having tachycardia with walking.  She denies any cough or congestion.  She has had no recorded fevers.  Due to continued symptoms she came to the ER for evaluation and treatment.  Past medical history significant for anemia, anxiety, PE, hypertension, GERD, obesity        Review of Systems   Constitutional: Negative.  Negative for fatigue and fever.   HENT:  Negative for congestion.    Respiratory:  Positive for shortness of breath. Negative for cough.    Cardiovascular:  Positive for chest pain and palpitations.   Gastrointestinal:  Positive for nausea. Negative for abdominal pain, constipation, diarrhea and vomiting.   Genitourinary: Negative.  Negative for dysuria.   Musculoskeletal: Negative.  Negative for back pain.   Skin: Negative.  Negative for rash and wound.   Neurological:  Positive for headaches.   All other systems reviewed and are negative.      Past Medical History:   Diagnosis Date    Abdominal hernia     Anemia     Anxiety     Deep vein thrombosis 9/20/2023    Diverticulitis     Essential hypertension 09/21/2023    Fibroid     GERD (gastroesophageal reflux disease)     History of swelling of feet      Ovarian cyst     PE (pulmonary thromboembolism) 09/20/2023    PMS (premenstrual syndrome)     Pulmonary arterial hypertension 9/20/2023    Pulmonary emboli 09/20/2023       No Known Allergies    Past Surgical History:   Procedure Laterality Date    ADENOIDECTOMY      APPENDECTOMY      CHOLECYSTECTOMY      open    HERNIA REPAIR      x3 living tissue    LAPAROSCOPIC CHOLECYSTECTOMY      LAPAROSCOPIC COLON RESECTION      LYMPH NODE DISSECTION      TONSILLECTOMY         Family History   Problem Relation Age of Onset    COPD Father     Coronary artery disease Father     Emphysema Father     Hypertension Father     Hyperlipidemia Father     Hypothyroidism Father     Lupus Mother     Osteoporosis Mother     Rheum arthritis Mother     Hypothyroidism Mother     Pancreatic cancer Mother     Rheum arthritis Brother     Hypereosinophilic syndrome Brother     Crohn's disease Brother     Heart disease Paternal Grandfather     Heart attack Paternal Grandfather     Aneurysm Paternal Grandmother     Hypertension Maternal Grandmother     Stroke Maternal Grandmother     Cancer Maternal Grandfather     Breast cancer Paternal Aunt     Colon cancer Paternal Aunt     Colon cancer Paternal Aunt     Ovarian cancer Neg Hx     Uterine cancer Neg Hx        Social History     Socioeconomic History    Marital status: Single   Tobacco Use    Smoking status: Never     Passive exposure: Never    Smokeless tobacco: Never   Vaping Use    Vaping status: Never Used   Substance and Sexual Activity    Alcohol use: Yes     Alcohol/week: 2.0 standard drinks of alcohol     Types: 1 Glasses of wine, 1 Cans of beer per week     Comment: rarely    Drug use: No    Sexual activity: Not Currently     Partners: Male     Birth control/protection: None           Objective   Physical Exam  Vitals and nursing note reviewed.   Constitutional:       Appearance: She is well-developed. She is obese.   HENT:      Head: Normocephalic and atraumatic.      Nose: Nose  normal.   Eyes:      Conjunctiva/sclera: Conjunctivae normal.      Pupils: Pupils are equal, round, and reactive to light.   Cardiovascular:      Rate and Rhythm: Normal rate and regular rhythm.      Heart sounds: Normal heart sounds.   Pulmonary:      Effort: Pulmonary effort is normal.      Breath sounds: Normal breath sounds.   Abdominal:      General: Bowel sounds are normal.      Palpations: Abdomen is soft.   Musculoskeletal:         General: Normal range of motion.      Cervical back: Normal range of motion and neck supple.   Skin:     General: Skin is warm and dry.      Capillary Refill: Capillary refill takes less than 2 seconds.   Neurological:      Mental Status: She is alert and oriented to person, place, and time.   Psychiatric:         Behavior: Behavior normal.         Procedures           ED Course  ED Course as of 03/09/24 0028   Tue Mar 05, 2024   2011 Assumed care of patient at this time for Ms. Felisha Glass BEBE.  Pending results of repeat troponin will reevaluate and disposition accordingly.  Please see Ms. Glass 's note above for HPI, ROS, physical examination findings.     [JS]   2058 Repeat troponin less than 6. [JS]   2112 Chest CTA showed: Limited exam due to respiratory motion artifact as well as attenuation artifact, no convincing evidence of pulmonary embolism or acute intrathoracic pathology, hepatic steatosis. [JS]   2113 Upon reevaluation patient resting comfortably in bed.  Reviewed with patient lab and imaging findings.  Discussed incidental finding of hepatic steatosis and need for continued outpatient follow-up with primary care provider as well as dietary modifications.  Discussed need for PCP follow-up within the next 48 hours for close reevaluation, strict return precautions, need for immediate return to ED should she develop any new or worsening symptoms.  Patient remains hemodynamically stable with resolution of her initial hypertensive status as well as resolution of her  initial tachycardic status.  Patient is tolerating p.o. fluids and is appreciative with no further questions, concerns, or needs at this time and is stable for discharge. [JS]      ED Course User Index  [JS] Stevan Hill PA-C                                             Medical Decision Making  Patient is a 38-year-old female who presents to the ER with chief complaints of chest pain.  She states that she began developing chest pain around 1:30 PM this afternoon after eating lunch.  She states that she was walking out to her vehicle when she began developing what she describes as substernal chest pressure and pain to the left shoulder.  She felt the pain radiated into her neck as well.  She reports having chronic shortness of breath due to previous history of PE.  Patient has had nausea without any vomiting.  She is on Eliquis for previous history of PE.  Patient states that she laid down when she got home to take a nap and all throughout her nap she was having chest pain and a headache.  She states when she woke up she was having worsening pain and reports having tachycardia with walking.  She denies any cough or congestion.  She has had no recorded fevers.  Due to continued symptoms she came to the ER for evaluation and treatment.  Past medical history significant for anemia, anxiety, PE, hypertension, GERD, obesity  Differential diagnosis: ACS, PE, pleurisy, pneumonia, and other  HEART Score for Major Cardiac Events - MDCalc  Calculated on Mar 05 2024 9:05 PM  2 points -> Low Score (0-3 points) Risk of MACE of 0.9-1.7%.    Workup remains pending.  This will be a turnover for Stevan MIDDLETON      Problems Addressed:  Chest pain, unspecified type: complicated acute illness or injury  Hepatic steatosis: complicated acute illness or injury    Amount and/or Complexity of Data Reviewed  Labs: ordered.  Radiology: ordered.  ECG/medicine tests: ordered.  Discussion of management or test interpretation with external  provider(s): Work up remains pending. This will be a turn over for Stevan MIDDLETON.    Risk  Prescription drug management.        Final diagnoses:   Chest pain, unspecified type   Hepatic steatosis       ED Disposition  ED Disposition       ED Disposition   Discharge    Condition   Stable    Comment   --               Douglas Carnes MD  [address]   [place] [address] [phone]    Schedule an appointment as soon as possible for a visit in 2 days      [facility] EMERGENCY DEPARTMENT  [address] [place] [id number] [phone]    As needed         Medication List      No changes were made to your prescriptions during this visit.            Felisha Glass APRN  03/05/24 2006       Felisha Glass APRN  03/09/24 0028

## 2024-03-06 NOTE — ED PROVIDER NOTES
Subjective   History of Present Illness    Review of Systems    Past Medical History:   Diagnosis Date    Abdominal hernia     Anemia     Anxiety     Deep vein thrombosis 9/20/2023    Diverticulitis     Essential hypertension 09/21/2023    Fibroid     GERD (gastroesophageal reflux disease)     History of swelling of feet     Ovarian cyst     PE (pulmonary thromboembolism) 09/20/2023    PMS (premenstrual syndrome)     Pulmonary arterial hypertension 9/20/2023    Pulmonary emboli 09/20/2023       No Known Allergies    Past Surgical History:   Procedure Laterality Date    ADENOIDECTOMY      APPENDECTOMY      CHOLECYSTECTOMY      open    HERNIA REPAIR      x3 living tissue    LAPAROSCOPIC CHOLECYSTECTOMY      LAPAROSCOPIC COLON RESECTION      LYMPH NODE DISSECTION      TONSILLECTOMY         Family History   Problem Relation Age of Onset    COPD Father     Coronary artery disease Father     Emphysema Father     Hypertension Father     Hyperlipidemia Father     Hypothyroidism Father     Lupus Mother     Osteoporosis Mother     Rheum arthritis Mother     Hypothyroidism Mother     Pancreatic cancer Mother     Rheum arthritis Brother     Hypereosinophilic syndrome Brother     Crohn's disease Brother     Heart disease Paternal Grandfather     Heart attack Paternal Grandfather     Aneurysm Paternal Grandmother     Hypertension Maternal Grandmother     Stroke Maternal Grandmother     Cancer Maternal Grandfather     Breast cancer Paternal Aunt     Colon cancer Paternal Aunt     Colon cancer Paternal Aunt     Ovarian cancer Neg Hx     Uterine cancer Neg Hx        Social History     Socioeconomic History    Marital status: Single   Tobacco Use    Smoking status: Never     Passive exposure: Never    Smokeless tobacco: Never   Vaping Use    Vaping status: Never Used   Substance and Sexual Activity    Alcohol use: Yes     Alcohol/week: 2.0 standard drinks of alcohol     Types: 1 Glasses of wine, 1 Cans of beer per week     Comment:  rarely    Drug use: No    Sexual activity: Not Currently     Partners: Male     Birth control/protection: None           Objective   Physical Exam    Procedures           ED Course  ED Course as of 03/05/24 2150 Tue Mar 05, 2024   2011 Assumed care of patient at this time for Ms. Felisha Glass APRN.  Pending results of repeat troponin will reevaluate and disposition accordingly.  Please see Ms. Glass 's note above for HPI, ROS, physical examination findings.     [JS]   2058 Repeat troponin less than 6. [JS]   2112 Chest CTA showed: Limited exam due to respiratory motion artifact as well as attenuation artifact, no convincing evidence of pulmonary embolism or acute intrathoracic pathology, hepatic steatosis. [JS]   2113 Upon reevaluation patient resting comfortably in bed.  Reviewed with patient lab and imaging findings.  Discussed incidental finding of hepatic steatosis and need for continued outpatient follow-up with primary care provider as well as dietary modifications.  Discussed need for PCP follow-up within the next 48 hours for close reevaluation, strict return precautions, need for immediate return to ED should she develop any new or worsening symptoms.  Patient remains hemodynamically stable with resolution of her initial hypertensive status as well as resolution of her initial tachycardic status.  Patient is tolerating p.o. fluids and is appreciative with no further questions, concerns, or needs at this time and is stable for discharge. [JS]      ED Course User Index  [JS] Stevan Hill PA-C                HEART Score: 2                              Medical Decision Making  Problems Addressed:  Chest pain, unspecified type: complicated acute illness or injury  Hepatic steatosis: complicated acute illness or injury    Amount and/or Complexity of Data Reviewed  Labs: ordered.  Radiology: ordered.  ECG/medicine tests: ordered.    Risk  Prescription drug management.        Final diagnoses:   Chest pain,  unspecified type   Hepatic steatosis       ED Disposition  ED Disposition       ED Disposition   Discharge    Condition   Stable    Comment   --               Douglas Carnes MD  [address]   [place] [address] [phone]    Schedule an appointment as soon as possible for a visit in 2 days      [facility] EMERGENCY DEPARTMENT  [address] [place] [id number] [phone]    As needed         Medication List      No changes were made to your prescriptions during this visit.            Stevan Hill PA-C  03/05/24 2152

## 2024-03-06 NOTE — DISCHARGE INSTRUCTIONS
As we discussed today your workup is well-appearing but it is important that you follow-up with your primary care provider within the next 48 hours for continued outpatient evaluation.  Please see information below to schedule your outpatient stress test.  Incidentally you have evidence of fatty liver disease for which it is important you make positive diet and lifestyle choices.  Should you develop any new or worsening symptoms please return to the ER for further evaluation.

## 2024-03-07 LAB
QT INTERVAL: 358 MS
QT INTERVAL: 386 MS
QTC INTERVAL: 447 MS
QTC INTERVAL: 456 MS